# Patient Record
Sex: FEMALE | Race: BLACK OR AFRICAN AMERICAN | NOT HISPANIC OR LATINO | URBAN - METROPOLITAN AREA
[De-identification: names, ages, dates, MRNs, and addresses within clinical notes are randomized per-mention and may not be internally consistent; named-entity substitution may affect disease eponyms.]

---

## 2015-12-24 RX ORDER — ALBUTEROL 90 UG/1
3 AEROSOL, METERED ORAL
Qty: 150 | Refills: 0 | COMMUNITY
Start: 2015-12-24

## 2015-12-24 RX ORDER — ALBUTEROL 90 UG/1
0 AEROSOL, METERED ORAL
Qty: 150 | Refills: 0 | COMMUNITY
Start: 2015-12-24

## 2017-03-27 ENCOUNTER — EMERGENCY (EMERGENCY)
Facility: HOSPITAL | Age: 52
LOS: 1 days | Discharge: HOME | End: 2017-03-27
Attending: EMERGENCY MEDICINE | Admitting: EMERGENCY MEDICINE
Payer: MEDICARE

## 2017-03-27 VITALS
DIASTOLIC BLOOD PRESSURE: 81 MMHG | RESPIRATION RATE: 18 BRPM | SYSTOLIC BLOOD PRESSURE: 151 MMHG | TEMPERATURE: 98 F | HEART RATE: 80 BPM | OXYGEN SATURATION: 96 %

## 2017-03-27 VITALS
RESPIRATION RATE: 20 BRPM | HEART RATE: 78 BPM | TEMPERATURE: 99 F | DIASTOLIC BLOOD PRESSURE: 80 MMHG | OXYGEN SATURATION: 97 % | SYSTOLIC BLOOD PRESSURE: 144 MMHG

## 2017-03-27 DIAGNOSIS — R51 HEADACHE: ICD-10-CM

## 2017-03-27 DIAGNOSIS — Z87.891 PERSONAL HISTORY OF NICOTINE DEPENDENCE: ICD-10-CM

## 2017-03-27 DIAGNOSIS — J45.909 UNSPECIFIED ASTHMA, UNCOMPLICATED: ICD-10-CM

## 2017-03-27 DIAGNOSIS — J44.9 CHRONIC OBSTRUCTIVE PULMONARY DISEASE, UNSPECIFIED: ICD-10-CM

## 2017-03-27 DIAGNOSIS — I10 ESSENTIAL (PRIMARY) HYPERTENSION: ICD-10-CM

## 2017-03-27 DIAGNOSIS — R05 COUGH: ICD-10-CM

## 2017-03-27 DIAGNOSIS — Z88.1 ALLERGY STATUS TO OTHER ANTIBIOTIC AGENTS STATUS: ICD-10-CM

## 2017-03-27 DIAGNOSIS — R11.2 NAUSEA WITH VOMITING, UNSPECIFIED: ICD-10-CM

## 2017-03-27 DIAGNOSIS — K64.8 OTHER HEMORRHOIDS: Chronic | ICD-10-CM

## 2017-03-27 DIAGNOSIS — Z79.82 LONG TERM (CURRENT) USE OF ASPIRIN: ICD-10-CM

## 2017-03-27 DIAGNOSIS — Z88.5 ALLERGY STATUS TO NARCOTIC AGENT: ICD-10-CM

## 2017-03-27 DIAGNOSIS — R19.7 DIARRHEA, UNSPECIFIED: ICD-10-CM

## 2017-03-27 DIAGNOSIS — Z79.899 OTHER LONG TERM (CURRENT) DRUG THERAPY: ICD-10-CM

## 2017-03-27 LAB
ALBUMIN SERPL ELPH-MCNC: 3.6 G/DL — SIGNIFICANT CHANGE UP (ref 3.4–5)
ALP SERPL-CCNC: 99 U/L — SIGNIFICANT CHANGE UP (ref 40–120)
ALT FLD-CCNC: 23 U/L — SIGNIFICANT CHANGE UP (ref 12–42)
ANION GAP SERPL CALC-SCNC: 7 MMOL/L — LOW (ref 9–16)
APPEARANCE UR: CLEAR — SIGNIFICANT CHANGE UP
AST SERPL-CCNC: 18 U/L — SIGNIFICANT CHANGE UP (ref 15–37)
BASOPHILS NFR BLD AUTO: 0.3 % — SIGNIFICANT CHANGE UP (ref 0–2)
BILIRUB SERPL-MCNC: 0.2 MG/DL — SIGNIFICANT CHANGE UP (ref 0.2–1.2)
BILIRUB UR-MCNC: NEGATIVE — SIGNIFICANT CHANGE UP
BUN SERPL-MCNC: 16 MG/DL — SIGNIFICANT CHANGE UP (ref 7–23)
CALCIUM SERPL-MCNC: 9.3 MG/DL — SIGNIFICANT CHANGE UP (ref 8.5–10.5)
CHLORIDE SERPL-SCNC: 106 MMOL/L — SIGNIFICANT CHANGE UP (ref 96–108)
CO2 SERPL-SCNC: 26 MMOL/L — SIGNIFICANT CHANGE UP (ref 22–31)
COLOR SPEC: YELLOW — SIGNIFICANT CHANGE UP
CREAT SERPL-MCNC: 0.65 MG/DL — SIGNIFICANT CHANGE UP (ref 0.5–1.3)
DIFF PNL FLD: NEGATIVE — SIGNIFICANT CHANGE UP
EOSINOPHIL NFR BLD AUTO: 1.1 % — SIGNIFICANT CHANGE UP (ref 0–6)
GLUCOSE SERPL-MCNC: 103 MG/DL — HIGH (ref 70–99)
GLUCOSE UR QL: NEGATIVE — SIGNIFICANT CHANGE UP
HCT VFR BLD CALC: 45.2 % — HIGH (ref 34.5–45)
HGB BLD-MCNC: 15.3 G/DL — SIGNIFICANT CHANGE UP (ref 11.5–15.5)
IMM GRANULOCYTES NFR BLD AUTO: 0.3 % — SIGNIFICANT CHANGE UP (ref 0–1.5)
KETONES UR-MCNC: NEGATIVE — SIGNIFICANT CHANGE UP
LEUKOCYTE ESTERASE UR-ACNC: (no result)
LIDOCAIN IGE QN: 165 U/L — SIGNIFICANT CHANGE UP (ref 73–393)
LYMPHOCYTES # BLD AUTO: 24.6 % — SIGNIFICANT CHANGE UP (ref 13–44)
MCHC RBC-ENTMCNC: 28.7 PG — SIGNIFICANT CHANGE UP (ref 27–34)
MCHC RBC-ENTMCNC: 33.8 G/DL — SIGNIFICANT CHANGE UP (ref 32–36)
MCV RBC AUTO: 84.6 FL — SIGNIFICANT CHANGE UP (ref 80–100)
MONOCYTES NFR BLD AUTO: 6.8 % — SIGNIFICANT CHANGE UP (ref 2–14)
NEUTROPHILS NFR BLD AUTO: 66.9 % — SIGNIFICANT CHANGE UP (ref 43–77)
NITRITE UR-MCNC: NEGATIVE — SIGNIFICANT CHANGE UP
PH UR: 6 — SIGNIFICANT CHANGE UP (ref 4–8.1)
PLATELET # BLD AUTO: 171 K/UL — SIGNIFICANT CHANGE UP (ref 150–400)
POTASSIUM SERPL-MCNC: 3.8 MMOL/L — SIGNIFICANT CHANGE UP (ref 3.5–5.3)
POTASSIUM SERPL-SCNC: 3.8 MMOL/L — SIGNIFICANT CHANGE UP (ref 3.5–5.3)
PROT SERPL-MCNC: 8.3 G/DL — HIGH (ref 6.4–8.2)
PROT UR-MCNC: NEGATIVE MG/DL — SIGNIFICANT CHANGE UP
RBC # BLD: 5.34 M/UL — HIGH (ref 3.8–5.2)
RBC # FLD: 13.6 % — SIGNIFICANT CHANGE UP (ref 10.3–16.9)
SODIUM SERPL-SCNC: 139 MMOL/L — SIGNIFICANT CHANGE UP (ref 132–145)
SP GR SPEC: 1.02 — SIGNIFICANT CHANGE UP (ref 1–1.03)
TROPONIN I SERPL-MCNC: <0.017 NG/ML — SIGNIFICANT CHANGE UP (ref 0.02–0.06)
UROBILINOGEN FLD QL: 0.2 E.U./DL — SIGNIFICANT CHANGE UP
WBC # BLD: 11.2 K/UL — HIGH (ref 3.8–10.5)
WBC # FLD AUTO: 11.2 K/UL — HIGH (ref 3.8–10.5)

## 2017-03-27 PROCEDURE — 99284 EMERGENCY DEPT VISIT MOD MDM: CPT | Mod: 25

## 2017-03-27 PROCEDURE — 71020: CPT | Mod: 26

## 2017-03-27 RX ORDER — SODIUM CHLORIDE 9 MG/ML
1000 INJECTION INTRAMUSCULAR; INTRAVENOUS; SUBCUTANEOUS ONCE
Qty: 0 | Refills: 0 | Status: COMPLETED | OUTPATIENT
Start: 2017-03-27 | End: 2017-03-27

## 2017-03-27 RX ORDER — DEXTROMETHORPHAN HYDROBROMIDE AND PROMETHAZINE HYDROCHLORIDE 15; 6.25 MG/5ML; MG/5ML
5 SYRUP ORAL
Qty: 140 | Refills: 0 | OUTPATIENT
Start: 2017-03-27 | End: 2017-04-03

## 2017-03-27 RX ORDER — KETOROLAC TROMETHAMINE 30 MG/ML
15 SYRINGE (ML) INJECTION ONCE
Qty: 0 | Refills: 0 | Status: DISCONTINUED | OUTPATIENT
Start: 2017-03-27 | End: 2017-03-27

## 2017-03-27 RX ORDER — IPRATROPIUM/ALBUTEROL SULFATE 18-103MCG
3 AEROSOL WITH ADAPTER (GRAM) INHALATION
Qty: 0 | Refills: 0 | Status: COMPLETED | OUTPATIENT
Start: 2017-03-27 | End: 2017-03-27

## 2017-03-27 RX ADMIN — Medication 15 MILLIGRAM(S): at 15:02

## 2017-03-27 RX ADMIN — Medication 3 MILLILITER(S): at 14:21

## 2017-03-27 RX ADMIN — Medication 3 MILLILITER(S): at 13:53

## 2017-03-27 RX ADMIN — SODIUM CHLORIDE 2000 MILLILITER(S): 9 INJECTION INTRAMUSCULAR; INTRAVENOUS; SUBCUTANEOUS at 13:29

## 2017-03-27 RX ADMIN — Medication 3 MILLILITER(S): at 13:58

## 2017-03-27 NOTE — ED PROVIDER NOTE - NS ED MD SCRIBE ATTENDING SCRIBE SECTIONS
PHYSICAL EXAM/PAST MEDICAL/SURGICAL/SOCIAL HISTORY/DISPOSITION/HIV/VITAL SIGNS( Pullset)/REVIEW OF SYSTEMS/HISTORY OF PRESENT ILLNESS/PROGRESS NOTE/RESULTS

## 2017-03-27 NOTE — ED PROVIDER NOTE - MEDICAL DECISION MAKING DETAILS
multiple symptoms in man organ systems.  Cough x 2 weeks, left chest wall pain, n/v/d, headache.  Appears well.  No vital sign derangements.  No abdominal or chest wall tenderness.  Lungs clear.  NO hypoxia.  Labs, cxr, pain medication ordered.

## 2017-03-27 NOTE — ED ADULT TRIAGE NOTE - CHIEF COMPLAINT QUOTE
Patient c/o generalized body ache, nausea, vomiting x3 days. Also w/productive cough x2 weeks. Denies fever.

## 2017-03-27 NOTE — ED PROVIDER NOTE - CHPI ED SYMPTOMS POS
NAUSEA/DIARRHEA/VOMITING/abd pain, rectal bleeding, cough, HA VOMITING/DIARRHEA/NAUSEA/abd pain,  cough, HA

## 2017-03-27 NOTE — ED PROVIDER NOTE - CARDIAC, MLM
Normal rate, regular rhythm.  Heart sounds S1, S2.  No murmurs, rubs or gallops. Normal rate, regular rhythm.   No murmurs, rubs or gallops.

## 2017-03-27 NOTE — ED PROVIDER NOTE - PROGRESS NOTE DETAILS
labs unremarkable.  CXR without acute findings.  Refused EKG.  Conservative management discussed with the patient in detail.  Close PMD follow up encouraged.  Strict ED return instructions discussed in detail and patient given the opportunity to ask any questions about their discharge diagnosis and instructions.  Has follow up at [facility].

## 2017-03-27 NOTE — ED PROVIDER NOTE - OBJECTIVE STATEMENT
51y f with h/o artritis, COPD, Anxiety, Heart Disease, Bleeding internal hemorrhoids, HTN, stroke (Nov 2016 - resulting mild L-sided weakness) pw n/v/d x 3 days. + cough/HA x 2 weeks. Pt came into today after her  told her she appeared ill. Pt also notes abd pain and rectal bleeding last night (now resolved). Denies f/c, hematuria, sob. No recent falls/trauma or foreign travels. 51y f with h/o artritis, COPD, Anxiety, Heart Disease, Bleeding internal hemorrhoids, HTN, stroke (Nov 2016 - resulting mild L-sided weakness) pw n/v/d x 3 days. + cough/HA x 2 weeks. Pt came into today after her  told her she appeared ill. Pt also notes abd pain and some blood in her stool last night (now resolved). Denies f/c, hematuria, sob. No recent falls/trauma or foreign travels.  Follows at [facility].  Uses COPD medications but still smoking.  Last hospitalization > 90 days ago

## 2017-05-24 RX ORDER — ATORVASTATIN CALCIUM 80 MG/1
1 TABLET, FILM COATED ORAL
Qty: 90 | Refills: 0 | COMMUNITY
Start: 2017-05-24

## 2017-05-24 RX ORDER — ATORVASTATIN CALCIUM 80 MG/1
0 TABLET, FILM COATED ORAL
Qty: 90 | Refills: 0 | COMMUNITY
Start: 2017-05-24

## 2017-07-24 RX ORDER — IBUPROFEN 200 MG
0 TABLET ORAL
Qty: 60 | Refills: 0 | COMMUNITY
Start: 2017-07-24

## 2017-07-24 RX ORDER — TIOTROPIUM BROMIDE 18 UG/1
1 CAPSULE ORAL; RESPIRATORY (INHALATION)
Qty: 30 | Refills: 0 | COMMUNITY
Start: 2017-07-24

## 2017-07-24 RX ORDER — FLUTICASONE PROPIONATE AND SALMETEROL 50; 250 UG/1; UG/1
1 POWDER ORAL; RESPIRATORY (INHALATION)
Qty: 60 | Refills: 0 | COMMUNITY
Start: 2017-07-24

## 2017-07-24 RX ORDER — METOPROLOL TARTRATE 50 MG
0 TABLET ORAL
Qty: 60 | Refills: 0 | COMMUNITY
Start: 2017-07-24

## 2017-07-24 RX ORDER — FLUTICASONE PROPIONATE AND SALMETEROL 50; 250 UG/1; UG/1
0 POWDER ORAL; RESPIRATORY (INHALATION)
Qty: 60 | Refills: 0 | COMMUNITY
Start: 2017-07-24

## 2017-07-24 RX ORDER — TIOTROPIUM BROMIDE 18 UG/1
0 CAPSULE ORAL; RESPIRATORY (INHALATION)
Qty: 30 | Refills: 0 | COMMUNITY
Start: 2017-07-24

## 2017-07-24 RX ORDER — ALBUTEROL 90 UG/1
0 AEROSOL, METERED ORAL
Qty: 85 | Refills: 0 | COMMUNITY
Start: 2017-07-24

## 2017-07-24 RX ORDER — LISINOPRIL 2.5 MG/1
1 TABLET ORAL
Qty: 30 | Refills: 0 | COMMUNITY
Start: 2017-07-24

## 2017-07-24 RX ORDER — METOPROLOL TARTRATE 50 MG
1 TABLET ORAL
Qty: 60 | Refills: 0 | COMMUNITY
Start: 2017-07-24

## 2017-07-24 RX ORDER — LISINOPRIL 2.5 MG/1
0 TABLET ORAL
Qty: 30 | Refills: 0 | COMMUNITY
Start: 2017-07-24

## 2017-07-26 RX ORDER — DICLOFENAC SODIUM 75 MG/1
0 TABLET, DELAYED RELEASE ORAL
Qty: 100 | Refills: 0 | COMMUNITY
Start: 2017-07-26

## 2017-07-26 RX ORDER — GABAPENTIN 400 MG/1
1 CAPSULE ORAL
Qty: 60 | Refills: 0 | COMMUNITY
Start: 2017-07-26

## 2017-07-26 RX ORDER — OXYCODONE HYDROCHLORIDE 5 MG/1
0.5 TABLET ORAL
Qty: 60 | Refills: 0 | COMMUNITY
Start: 2017-07-26

## 2017-07-26 RX ORDER — OXYCODONE HYDROCHLORIDE 5 MG/1
0 TABLET ORAL
Qty: 60 | Refills: 0 | COMMUNITY
Start: 2017-07-26

## 2017-07-26 RX ORDER — TIZANIDINE 4 MG/1
1 TABLET ORAL
Qty: 30 | Refills: 0 | COMMUNITY
Start: 2017-07-26

## 2017-07-26 RX ORDER — GABAPENTIN 400 MG/1
0 CAPSULE ORAL
Qty: 60 | Refills: 0 | COMMUNITY
Start: 2017-07-26

## 2017-07-26 RX ORDER — TIZANIDINE 4 MG/1
0 TABLET ORAL
Qty: 30 | Refills: 0 | COMMUNITY
Start: 2017-07-26

## 2017-07-26 RX ORDER — DICLOFENAC SODIUM 75 MG/1
1 TABLET, DELAYED RELEASE ORAL
Qty: 100 | Refills: 0 | COMMUNITY
Start: 2017-07-26

## 2017-07-31 RX ORDER — AZITHROMYCIN 500 MG/1
0 TABLET, FILM COATED ORAL
Qty: 7 | Refills: 0 | COMMUNITY
Start: 2017-07-31

## 2017-07-31 RX ORDER — AZITHROMYCIN 500 MG/1
1 TABLET, FILM COATED ORAL
Qty: 7 | Refills: 0 | COMMUNITY
Start: 2017-07-31

## 2017-08-02 ENCOUNTER — INPATIENT (INPATIENT)
Facility: HOSPITAL | Age: 52
LOS: 0 days | Discharge: HOME | DRG: 202 | End: 2017-08-03
Attending: STUDENT IN AN ORGANIZED HEALTH CARE EDUCATION/TRAINING PROGRAM | Admitting: STUDENT IN AN ORGANIZED HEALTH CARE EDUCATION/TRAINING PROGRAM
Payer: MEDICARE

## 2017-08-02 VITALS
RESPIRATION RATE: 18 BRPM | WEIGHT: 216.05 LBS | HEART RATE: 91 BPM | SYSTOLIC BLOOD PRESSURE: 154 MMHG | TEMPERATURE: 98 F | OXYGEN SATURATION: 95 % | DIASTOLIC BLOOD PRESSURE: 104 MMHG

## 2017-08-02 DIAGNOSIS — E78.5 HYPERLIPIDEMIA, UNSPECIFIED: ICD-10-CM

## 2017-08-02 DIAGNOSIS — Z29.9 ENCOUNTER FOR PROPHYLACTIC MEASURES, UNSPECIFIED: ICD-10-CM

## 2017-08-02 DIAGNOSIS — G47.33 OBSTRUCTIVE SLEEP APNEA (ADULT) (PEDIATRIC): ICD-10-CM

## 2017-08-02 DIAGNOSIS — J44.9 CHRONIC OBSTRUCTIVE PULMONARY DISEASE, UNSPECIFIED: ICD-10-CM

## 2017-08-02 DIAGNOSIS — R05 COUGH: ICD-10-CM

## 2017-08-02 DIAGNOSIS — I50.9 HEART FAILURE, UNSPECIFIED: ICD-10-CM

## 2017-08-02 DIAGNOSIS — F41.9 ANXIETY DISORDER, UNSPECIFIED: ICD-10-CM

## 2017-08-02 DIAGNOSIS — I25.10 ATHEROSCLEROTIC HEART DISEASE OF NATIVE CORONARY ARTERY WITHOUT ANGINA PECTORIS: ICD-10-CM

## 2017-08-02 DIAGNOSIS — K64.8 OTHER HEMORRHOIDS: Chronic | ICD-10-CM

## 2017-08-02 DIAGNOSIS — I10 ESSENTIAL (PRIMARY) HYPERTENSION: ICD-10-CM

## 2017-08-02 DIAGNOSIS — R07.9 CHEST PAIN, UNSPECIFIED: ICD-10-CM

## 2017-08-02 LAB
ALBUMIN SERPL ELPH-MCNC: 3.7 G/DL — SIGNIFICANT CHANGE UP (ref 3.4–5)
ALP SERPL-CCNC: 83 U/L — SIGNIFICANT CHANGE UP (ref 40–120)
ALT FLD-CCNC: 26 U/L — SIGNIFICANT CHANGE UP (ref 12–42)
ANION GAP SERPL CALC-SCNC: 9 MMOL/L — SIGNIFICANT CHANGE UP (ref 9–16)
APPEARANCE UR: CLEAR — SIGNIFICANT CHANGE UP
AST SERPL-CCNC: 26 U/L — SIGNIFICANT CHANGE UP (ref 15–37)
BASOPHILS NFR BLD AUTO: 0.5 % — SIGNIFICANT CHANGE UP (ref 0–2)
BILIRUB SERPL-MCNC: 0.4 MG/DL — SIGNIFICANT CHANGE UP (ref 0.2–1.2)
BILIRUB UR-MCNC: NEGATIVE — SIGNIFICANT CHANGE UP
BUN SERPL-MCNC: 8 MG/DL — SIGNIFICANT CHANGE UP (ref 7–23)
CALCIUM SERPL-MCNC: 9.6 MG/DL — SIGNIFICANT CHANGE UP (ref 8.5–10.5)
CHLORIDE SERPL-SCNC: 108 MMOL/L — SIGNIFICANT CHANGE UP (ref 96–108)
CO2 SERPL-SCNC: 24 MMOL/L — SIGNIFICANT CHANGE UP (ref 22–31)
COLOR SPEC: YELLOW — SIGNIFICANT CHANGE UP
CREAT SERPL-MCNC: 0.75 MG/DL — SIGNIFICANT CHANGE UP (ref 0.5–1.3)
D DIMER BLD IA.RAPID-MCNC: <150 NG/ML DDU — SIGNIFICANT CHANGE UP
DIFF PNL FLD: NEGATIVE — SIGNIFICANT CHANGE UP
EOSINOPHIL NFR BLD AUTO: 2.4 % — SIGNIFICANT CHANGE UP (ref 0–6)
GLUCOSE SERPL-MCNC: 105 MG/DL — HIGH (ref 70–99)
GLUCOSE UR QL: NEGATIVE — SIGNIFICANT CHANGE UP
HCT VFR BLD CALC: 43.2 % — SIGNIFICANT CHANGE UP (ref 34.5–45)
HGB BLD-MCNC: 14.5 G/DL — SIGNIFICANT CHANGE UP (ref 11.5–15.5)
HIV 1 & 2 AB SERPL IA.RAPID: SIGNIFICANT CHANGE UP
IMM GRANULOCYTES NFR BLD AUTO: 0.3 % — SIGNIFICANT CHANGE UP (ref 0–1.5)
KETONES UR-MCNC: NEGATIVE — SIGNIFICANT CHANGE UP
LEUKOCYTE ESTERASE UR-ACNC: NEGATIVE — SIGNIFICANT CHANGE UP
LYMPHOCYTES # BLD AUTO: 33.8 % — SIGNIFICANT CHANGE UP (ref 13–44)
MAGNESIUM SERPL-MCNC: 2.1 MG/DL — SIGNIFICANT CHANGE UP (ref 1.6–2.6)
MCHC RBC-ENTMCNC: 28.4 PG — SIGNIFICANT CHANGE UP (ref 27–34)
MCHC RBC-ENTMCNC: 33.6 G/DL — SIGNIFICANT CHANGE UP (ref 32–36)
MCV RBC AUTO: 84.7 FL — SIGNIFICANT CHANGE UP (ref 80–100)
MONOCYTES NFR BLD AUTO: 5.8 % — SIGNIFICANT CHANGE UP (ref 2–14)
NEUTROPHILS NFR BLD AUTO: 57.2 % — SIGNIFICANT CHANGE UP (ref 43–77)
NITRITE UR-MCNC: NEGATIVE — SIGNIFICANT CHANGE UP
NT-PROBNP SERPL-SCNC: 54 PG/ML — SIGNIFICANT CHANGE UP
PH UR: 6 — SIGNIFICANT CHANGE UP (ref 5–8)
PLATELET # BLD AUTO: 159 K/UL — SIGNIFICANT CHANGE UP (ref 150–400)
POTASSIUM SERPL-MCNC: 3.6 MMOL/L — SIGNIFICANT CHANGE UP (ref 3.5–5.3)
POTASSIUM SERPL-SCNC: 3.6 MMOL/L — SIGNIFICANT CHANGE UP (ref 3.5–5.3)
PROT SERPL-MCNC: 7.7 G/DL — SIGNIFICANT CHANGE UP (ref 6.4–8.2)
PROT UR-MCNC: NEGATIVE MG/DL — SIGNIFICANT CHANGE UP
RBC # BLD: 5.1 M/UL — SIGNIFICANT CHANGE UP (ref 3.8–5.2)
RBC # FLD: 14 % — SIGNIFICANT CHANGE UP (ref 10.3–16.9)
SODIUM SERPL-SCNC: 141 MMOL/L — SIGNIFICANT CHANGE UP (ref 132–145)
SP GR SPEC: 1.02 — SIGNIFICANT CHANGE UP (ref 1–1.03)
TROPONIN I SERPL-MCNC: <0.017 NG/ML — LOW (ref 0.02–0.06)
TROPONIN I SERPL-MCNC: <0.017 NG/ML — LOW (ref 0.02–0.06)
UROBILINOGEN FLD QL: 0.2 E.U./DL — SIGNIFICANT CHANGE UP
WBC # BLD: 7.6 K/UL — SIGNIFICANT CHANGE UP (ref 3.8–10.5)
WBC # FLD AUTO: 7.6 K/UL — SIGNIFICANT CHANGE UP (ref 3.8–10.5)

## 2017-08-02 PROCEDURE — 71020: CPT | Mod: 26

## 2017-08-02 PROCEDURE — 99236 HOSP IP/OBS SAME DATE HI 85: CPT | Mod: 25

## 2017-08-02 PROCEDURE — 99285 EMERGENCY DEPT VISIT HI MDM: CPT

## 2017-08-02 PROCEDURE — 99223 1ST HOSP IP/OBS HIGH 75: CPT | Mod: GC

## 2017-08-02 PROCEDURE — 93010 ELECTROCARDIOGRAM REPORT: CPT

## 2017-08-02 RX ORDER — IPRATROPIUM/ALBUTEROL SULFATE 18-103MCG
3 AEROSOL WITH ADAPTER (GRAM) INHALATION EVERY 6 HOURS
Qty: 0 | Refills: 0 | Status: DISCONTINUED | OUTPATIENT
Start: 2017-08-02 | End: 2017-08-03

## 2017-08-02 RX ORDER — METOPROLOL TARTRATE 50 MG
0 TABLET ORAL
Qty: 0 | Refills: 0 | COMMUNITY

## 2017-08-02 RX ORDER — NITROGLYCERIN 6.5 MG
0.4 CAPSULE, EXTENDED RELEASE ORAL ONCE
Qty: 0 | Refills: 0 | Status: COMPLETED | OUTPATIENT
Start: 2017-08-02 | End: 2017-08-02

## 2017-08-02 RX ORDER — KETOROLAC TROMETHAMINE 30 MG/ML
30 SYRINGE (ML) INJECTION ONCE
Qty: 0 | Refills: 0 | Status: DISCONTINUED | OUTPATIENT
Start: 2017-08-02 | End: 2017-08-02

## 2017-08-02 RX ORDER — LISINOPRIL 2.5 MG/1
0 TABLET ORAL
Qty: 0 | Refills: 0 | COMMUNITY

## 2017-08-02 RX ORDER — AZITHROMYCIN 500 MG/1
250 TABLET, FILM COATED ORAL ONCE
Qty: 0 | Refills: 0 | Status: COMPLETED | OUTPATIENT
Start: 2017-08-02 | End: 2017-08-02

## 2017-08-02 RX ORDER — NICOTINE POLACRILEX 2 MG
1 GUM BUCCAL DAILY
Qty: 0 | Refills: 0 | Status: DISCONTINUED | OUTPATIENT
Start: 2017-08-03 | End: 2017-08-03

## 2017-08-02 RX ORDER — MAGNESIUM SULFATE 500 MG/ML
2 VIAL (ML) INJECTION ONCE
Qty: 0 | Refills: 0 | Status: COMPLETED | OUTPATIENT
Start: 2017-08-02 | End: 2017-08-02

## 2017-08-02 RX ORDER — BUDESONIDE AND FORMOTEROL FUMARATE DIHYDRATE 160; 4.5 UG/1; UG/1
2 AEROSOL RESPIRATORY (INHALATION)
Qty: 0 | Refills: 0 | Status: DISCONTINUED | OUTPATIENT
Start: 2017-08-02 | End: 2017-08-03

## 2017-08-02 RX ORDER — QUETIAPINE FUMARATE 200 MG/1
0 TABLET, FILM COATED ORAL
Qty: 0 | Refills: 0 | COMMUNITY

## 2017-08-02 RX ORDER — METOPROLOL TARTRATE 50 MG
25 TABLET ORAL
Qty: 0 | Refills: 0 | Status: DISCONTINUED | OUTPATIENT
Start: 2017-08-02 | End: 2017-08-03

## 2017-08-02 RX ORDER — ALBUTEROL 90 UG/1
2.5 AEROSOL, METERED ORAL
Qty: 0 | Refills: 0 | Status: COMPLETED | OUTPATIENT
Start: 2017-08-02 | End: 2017-08-02

## 2017-08-02 RX ORDER — CYCLOBENZAPRINE HYDROCHLORIDE 10 MG/1
10 TABLET, FILM COATED ORAL AT BEDTIME
Qty: 0 | Refills: 0 | Status: DISCONTINUED | OUTPATIENT
Start: 2017-08-02 | End: 2017-08-03

## 2017-08-02 RX ORDER — GABAPENTIN 400 MG/1
300 CAPSULE ORAL
Qty: 0 | Refills: 0 | Status: DISCONTINUED | OUTPATIENT
Start: 2017-08-02 | End: 2017-08-03

## 2017-08-02 RX ORDER — ASPIRIN/CALCIUM CARB/MAGNESIUM 324 MG
0 TABLET ORAL
Qty: 0 | Refills: 0 | COMMUNITY

## 2017-08-02 RX ORDER — OXYCODONE HYDROCHLORIDE 5 MG/1
0 TABLET ORAL
Qty: 0 | Refills: 0 | COMMUNITY

## 2017-08-02 RX ORDER — ASPIRIN/CALCIUM CARB/MAGNESIUM 324 MG
81 TABLET ORAL DAILY
Qty: 0 | Refills: 0 | Status: DISCONTINUED | OUTPATIENT
Start: 2017-08-02 | End: 2017-08-03

## 2017-08-02 RX ORDER — ONDANSETRON 8 MG/1
8 TABLET, FILM COATED ORAL ONCE
Qty: 0 | Refills: 0 | Status: COMPLETED | OUTPATIENT
Start: 2017-08-02 | End: 2017-08-02

## 2017-08-02 RX ORDER — OXYCODONE AND ACETAMINOPHEN 5; 325 MG/1; MG/1
1 TABLET ORAL ONCE
Qty: 0 | Refills: 0 | Status: DISCONTINUED | OUTPATIENT
Start: 2017-08-02 | End: 2017-08-02

## 2017-08-02 RX ORDER — HEPARIN SODIUM 5000 [USP'U]/ML
5000 INJECTION INTRAVENOUS; SUBCUTANEOUS EVERY 8 HOURS
Qty: 0 | Refills: 0 | Status: DISCONTINUED | OUTPATIENT
Start: 2017-08-02 | End: 2017-08-03

## 2017-08-02 RX ORDER — ATORVASTATIN CALCIUM 80 MG/1
0 TABLET, FILM COATED ORAL
Qty: 0 | Refills: 0 | COMMUNITY

## 2017-08-02 RX ORDER — IPRATROPIUM/ALBUTEROL SULFATE 18-103MCG
3 AEROSOL WITH ADAPTER (GRAM) INHALATION ONCE
Qty: 0 | Refills: 0 | Status: COMPLETED | OUTPATIENT
Start: 2017-08-02 | End: 2017-08-02

## 2017-08-02 RX ORDER — HYDROCHLOROTHIAZIDE 25 MG
25 TABLET ORAL DAILY
Qty: 0 | Refills: 0 | Status: DISCONTINUED | OUTPATIENT
Start: 2017-08-02 | End: 2017-08-03

## 2017-08-02 RX ORDER — OXYCODONE AND ACETAMINOPHEN 5; 325 MG/1; MG/1
2 TABLET ORAL ONCE
Qty: 0 | Refills: 0 | Status: DISCONTINUED | OUTPATIENT
Start: 2017-08-02 | End: 2017-08-02

## 2017-08-02 RX ORDER — ATORVASTATIN CALCIUM 80 MG/1
40 TABLET, FILM COATED ORAL AT BEDTIME
Qty: 0 | Refills: 0 | Status: DISCONTINUED | OUTPATIENT
Start: 2017-08-02 | End: 2017-08-03

## 2017-08-02 RX ORDER — OXYCODONE HYDROCHLORIDE 5 MG/1
15 TABLET ORAL
Qty: 0 | Refills: 0 | Status: DISCONTINUED | OUTPATIENT
Start: 2017-08-02 | End: 2017-08-03

## 2017-08-02 RX ORDER — POTASSIUM CHLORIDE 20 MEQ
40 PACKET (EA) ORAL ONCE
Qty: 0 | Refills: 0 | Status: COMPLETED | OUTPATIENT
Start: 2017-08-02 | End: 2017-08-02

## 2017-08-02 RX ORDER — GABAPENTIN 400 MG/1
0 CAPSULE ORAL
Qty: 0 | Refills: 0 | COMMUNITY

## 2017-08-02 RX ORDER — TIOTROPIUM BROMIDE 18 UG/1
1 CAPSULE ORAL; RESPIRATORY (INHALATION) DAILY
Qty: 0 | Refills: 0 | Status: DISCONTINUED | OUTPATIENT
Start: 2017-08-02 | End: 2017-08-03

## 2017-08-02 RX ORDER — DIAZEPAM 5 MG
0 TABLET ORAL
Qty: 0 | Refills: 0 | COMMUNITY

## 2017-08-02 RX ORDER — LISINOPRIL 2.5 MG/1
40 TABLET ORAL DAILY
Qty: 0 | Refills: 0 | Status: DISCONTINUED | OUTPATIENT
Start: 2017-08-02 | End: 2017-08-03

## 2017-08-02 RX ORDER — SODIUM CHLORIDE 9 MG/ML
1000 INJECTION INTRAMUSCULAR; INTRAVENOUS; SUBCUTANEOUS ONCE
Qty: 0 | Refills: 0 | Status: COMPLETED | OUTPATIENT
Start: 2017-08-02 | End: 2017-08-02

## 2017-08-02 RX ADMIN — Medication 30 MILLIGRAM(S): at 12:19

## 2017-08-02 RX ADMIN — AZITHROMYCIN 250 MILLIGRAM(S): 500 TABLET, FILM COATED ORAL at 14:27

## 2017-08-02 RX ADMIN — OXYCODONE AND ACETAMINOPHEN 2 TABLET(S): 5; 325 TABLET ORAL at 19:34

## 2017-08-02 RX ADMIN — OXYCODONE AND ACETAMINOPHEN 2 TABLET(S): 5; 325 TABLET ORAL at 21:00

## 2017-08-02 RX ADMIN — Medication 0.4 MILLIGRAM(S): at 13:20

## 2017-08-02 RX ADMIN — Medication 200 MILLIGRAM(S): at 10:02

## 2017-08-02 RX ADMIN — ALBUTEROL 2.5 MILLIGRAM(S): 90 AEROSOL, METERED ORAL at 16:20

## 2017-08-02 RX ADMIN — OXYCODONE AND ACETAMINOPHEN 1 TABLET(S): 5; 325 TABLET ORAL at 15:23

## 2017-08-02 RX ADMIN — ALBUTEROL 2.5 MILLIGRAM(S): 90 AEROSOL, METERED ORAL at 14:59

## 2017-08-02 RX ADMIN — Medication 3 MILLILITER(S): at 22:26

## 2017-08-02 RX ADMIN — ONDANSETRON 8 MILLIGRAM(S): 8 TABLET, FILM COATED ORAL at 09:22

## 2017-08-02 RX ADMIN — Medication 3 MILLILITER(S): at 09:22

## 2017-08-02 RX ADMIN — ALBUTEROL 2.5 MILLIGRAM(S): 90 AEROSOL, METERED ORAL at 16:00

## 2017-08-02 RX ADMIN — SODIUM CHLORIDE 1000 MILLILITER(S): 9 INJECTION INTRAMUSCULAR; INTRAVENOUS; SUBCUTANEOUS at 10:02

## 2017-08-02 RX ADMIN — Medication 50 GRAM(S): at 09:22

## 2017-08-02 RX ADMIN — Medication 125 MILLIGRAM(S): at 09:22

## 2017-08-02 RX ADMIN — OXYCODONE AND ACETAMINOPHEN 1 TABLET(S): 5; 325 TABLET ORAL at 14:53

## 2017-08-02 RX ADMIN — Medication 30 MILLIGRAM(S): at 11:49

## 2017-08-02 RX ADMIN — HEPARIN SODIUM 5000 UNIT(S): 5000 INJECTION INTRAVENOUS; SUBCUTANEOUS at 22:26

## 2017-08-02 NOTE — ED CDU PROVIDER NOTE - DETAILS
Patient placed on obs for serial trops and nebs for COPD exacerbation. May need admission and cards eval.

## 2017-08-02 NOTE — ED ADULT TRIAGE NOTE - CHIEF COMPLAINT QUOTE
was prescribed azithromycin for lung infection, pt c/o headache, chest pressure, sob, productive cough with yellow phlegm

## 2017-08-02 NOTE — H&P ADULT - NSHPLABSRESULTS_GEN_ALL_CORE
.  LABS:                         14.5   7.6   )-----------( 159      ( 02 Aug 2017 09:22 )             43.2     08-    141  |  108  |  8   ----------------------------<  105<H>  3.6   |  24  |  0.75    Ca    9.6      02 Aug 2017 09:22  Mg     2.1         TPro  7.7  /  Alb  3.7  /  TBili  0.4  /  DBili  x   /  AST  26  /  ALT  26  /  AlkPhos  83  08      Urinalysis Basic - ( 02 Aug 2017 11:07 )    Color: Yellow / Appearance: Clear / S.020 / pH: x  Gluc: x / Ketone: NEGATIVE  / Bili: NEGATIVE / Urobili: 0.2 E.U./dL   Blood: x / Protein: NEGATIVE mg/dL / Nitrite: NEGATIVE   Leuk Esterase: NEGATIVE / RBC: x / WBC x   Sq Epi: x / Non Sq Epi: x / Bacteria: x      CARDIAC MARKERS ( 02 Aug 2017 12:59 )  <0.017 ng/mL / x     / x     / x     / x      CARDIAC MARKERS ( 02 Aug 2017 09:22 )  <0.017 ng/mL / x     / x     / x     / x                RADIOLOGY, EKG & ADDITIONAL TESTS: Reviewed.   < from: Xray Chest 2 Views PA/Lat (17 @ 09:26) >    EXAM:  XR CHEST PA LAT 2V                           PROCEDURE DATE:  2017                     INTERPRETATION:  CHEST X-RAY dated 2017 9:26 AM    Indication: 52-year-old female with cough    PA and lateral views of the chest are compared to prior chest radiograph   dated 3/27/2017. The cardiomediastinal silhouette is within normal   limits. There is no cardiomegaly. The lungs are clear. No pleural   effusion or pneumothorax visualized. Similar to prior exam.      IMPRESSION:  Clear lungs.    < end of copied text >    EKG:  NSR @ rate of 84 w/ T wave inversions in I, II, III, AVF, AVL, V2-V3 unchanged from prior  Bedside echo:  TDS, EF 55-60%, no gross wall motion abnormalities, no pericardial effusion, normal IVC  (Per Dr. Cook at [facility])

## 2017-08-02 NOTE — H&P ADULT - ASSESSMENT
Pt is a 53 y/o w/ above pmh who is admitted to [facility] from [facility] for cough.

## 2017-08-02 NOTE — H&P ADULT - NSHPSOCIALHISTORY_GEN_ALL_CORE
Current smoker 1/2 ppd, but formerly 1 ppd for 35 years  No EtOH  No drugs    Lives with biological grandaughter but  to her. Has home health attendant 7 days a week

## 2017-08-02 NOTE — H&P ADULT - NSHPPHYSICALEXAM_GEN_ALL_CORE
.  VITAL SIGNS:  T(C): 36.8 (08-02-17 @ 21:29), Max: 36.9 (08-02-17 @ 13:54)  T(F): 98.2 (08-02-17 @ 21:29), Max: 98.5 (08-02-17 @ 13:54)  HR: 93 (08-02-17 @ 21:29) (75 - 108)  BP: 148/83 (08-02-17 @ 21:29) (141/75 - 179/91)  BP(mean): --  RR: 20 (08-02-17 @ 21:29) (16 - 20)  SpO2: 95% (08-02-17 @ 21:29) (94% - 96%)  Wt(kg): --    PHYSICAL EXAM:    Constitutional: WDWN resting comfortably in bed; NAD  Head: NC/AT  Eyes: PERRL, EOMI, anicteric sclera  ENT: no nasal discharge; uvula midline, no oropharyngeal erythema or exudates; MMM  Neck: supple; no JVD or thyromegaly  Respiratory: CTA B/L; no W/R/R, no retractions  Cardiac: +S1/S2; RRR; no M/R/G; PMI non-displaced  Gastrointestinal: soft, NT/ND; no rebound or guarding; +BSx4  Genitourinary: normal external genitalia  Back: spine midline, no bony tenderness or step-offs; no CVAT B/L  Extremities: WWP, no clubbing or cyanosis; no peripheral edema  Musculoskeletal: NROM x4; no joint swelling, tenderness or erythema  Vascular: 2+ radial, femoral, DP/PT pulses B/L  Dermatologic: skin warm, dry and intact; no rashes, wounds, or scars  Lymphatic: no submandibular or cervical LAD  Neurologic: AAOx3; CNII-XII grossly intact; no focal deficits  Psychiatric: affect and characteristics of appearance, verbalizations, behaviors are appropriate .  VITAL SIGNS:  T(C): 36.8 (08-02-17 @ 21:29), Max: 36.9 (08-02-17 @ 13:54)  T(F): 98.2 (08-02-17 @ 21:29), Max: 98.5 (08-02-17 @ 13:54)  HR: 93 (08-02-17 @ 21:29) (75 - 108)  BP: 148/83 (08-02-17 @ 21:29) (141/75 - 179/91)  BP(mean): --  RR: 20 (08-02-17 @ 21:29) (16 - 20)  SpO2: 95% (08-02-17 @ 21:29) (94% - 96%)  Wt(kg): --    PHYSICAL EXAM:    Constitutional: obese, WDWN resting comfortably in bed; NAD  Head: NC/AT  Eyes: PERRL, EOMI, anicteric sclera  ENT: no nasal discharge; uvula midline, no oropharyngeal erythema or exudates; dry MM  Neck: supple; no JVD or thyromegaly  Respiratory: CTA B/L; no W/R/R, no tripoding, speaking in full sentences, non-labored.   Cardiac: +S1/S2; RRR; no M/R/G appreciated, CP over medial left intercostal space that is reproducible with palpation  Gastrointestinal: soft, NT/ND; no rebound or guarding; +BSx4, scar under belly button  Back: spine midline, no bony tenderness or step-offs; no CVAT B/L  Extremities: WWP, no clubbing or cyanosis; no peripheral edema  Musculoskeletal: NROM x4; no joint swelling, tenderness or erythema  Vascular: 2+ radial, femoral, DP/PT pulses B/L  Dermatologic: skin warm, dry and intact; no rashes, wounds, or scars  Lymphatic: no submandibular or cervical LAD  Neurologic: AAOx3; CNII-XII grossly intact; decreased strength of left extremities (3/5) compared to R side (4/5)  Psychiatric: histrionic w/ tangential thinking .  VITAL SIGNS:  T(C): 36.8 (08-02-17 @ 21:29), Max: 36.9 (08-02-17 @ 13:54)  T(F): 98.2 (08-02-17 @ 21:29), Max: 98.5 (08-02-17 @ 13:54)  HR: 93 (08-02-17 @ 21:29) (75 - 108)  BP: 148/83 (08-02-17 @ 21:29) (141/75 - 179/91)  BP(mean): --  RR: 20 (08-02-17 @ 21:29) (16 - 20)  SpO2: 95% (08-02-17 @ 21:29) (94% - 96%)  Wt(kg): --    PHYSICAL EXAM:    Constitutional: obese, WDWN resting comfortably in bed; NAD  Head: NC/AT  Eyes: PERRL, EOMI, anicteric sclera  ENT: no nasal discharge; uvula midline, no oropharyngeal erythema or exudates; dry MM, upper dentures  Neck: supple; no JVD or thyromegaly  Respiratory: CTA B/L; no W/R/R, no tripoding, speaking in full sentences, non-labored.   Cardiac: +S1/S2; RRR; no M/R/G appreciated, CP over medial left intercostal space that is reproducible with palpation  Gastrointestinal: soft, NT/ND; no rebound or guarding; +BSx4, scar under belly button  Back: spine midline, no bony tenderness or step-offs; no CVAT B/L  Extremities: WWP, no clubbing or cyanosis; no peripheral edema  Musculoskeletal: NROM x4; no joint swelling, tenderness or erythema  Vascular: 2+ radial, femoral, DP/PT pulses B/L  Dermatologic: skin warm, dry and intact; no rashes, wounds, or scars  Lymphatic: no submandibular or cervical LAD  Neurologic: AAOx3; CNII-XII grossly intact; decreased strength of left extremities (3/5) compared to R side (4/5)  Psychiatric: histrionic w/ tangential thinking

## 2017-08-02 NOTE — ED ADULT NURSE NOTE - OBJECTIVE STATEMENT
productive cough (thick, tan) x 1 month, vomiting, diarrhea, headache and chest wall pain d/t coughing x 2 days, awaiting MD jorgensen, will continue to monitor

## 2017-08-02 NOTE — H&P ADULT - PMH
Anxiety    Asthma    CAD (coronary artery disease)    Chronic pain    COPD (chronic obstructive pulmonary disease)    Depression    Heart disease    HLD (hyperlipidemia)    HTN (hypertension)    NUZHAT (obstructive sleep apnea)    Suicide attempt    Tobacco abuse    Trichotillomania in adult

## 2017-08-02 NOTE — CONSULT NOTE ADULT - SUBJECTIVE AND OBJECTIVE BOX
[facility] ([place] ER CARDIOLOGY CONSULT NOTE    Patient is a 52y old  Female who presents with a chief complaint of SOB    HPI:52y Female with PMHx significant for CAD (h/o MI with stent in ), CVA ( with slight residual left sided weakness), HTN, HLD, NUZHAT on CPAP, COPD, ongoing tobacco abuse, chronic pain/arthritis, depression (h/o past suicide attempts), cervical CA (h/o LEEP procedure) p/w 2 weeks of progressive SOB.  She notes associated productive cough.  She notes vomiting x 2 days.  The SOB has been accompanied by pleuritic CP x 2 days.  The chest pain is described as "tightness" and is substernal.  She does not note radiation of the pain.  It is worth with deep breathing, palpation, and cough.  Her baseline functional status is limited to about 1 block of ambulation.  The past 2 days she is SOB at rest and with minimal exertion.  She believes she had a normal stress test about 2 years ago.  She has not seen a pulmonologist or cardiologist in the last year.  She denies any recent hospitalizations.    REVIEW OF SYSTEMS: Denies syncope, palpitations, PND, claudication    PAST MEDICAL & SURGICAL HISTORY:  CAD (h/o MI with stent in ), CVA ( with slight residual left sided weakness), HTN, HLD, NUZHAT on CPAP, COPD, ongoing tobacco abuse, chronic pain/arthritis, depression (h/o past suicide attempts), cervical CA (h/o LEEP procedure)   hemorrhoidectomy, colon polypectomy, C/S x 2, tubal ligation, LEEP    ALLERGIES:  tramadol (Unknown)  vancomycin (Unknown)    MEDICATIONS  (home): oxycodone, neurontin, trazodone, seroquel, diazepam, asa, lipitor, lisinopril, metoprolol, meclizine, albuterol, spiriva, advair    SOCIAL HISTORY:  Tobacco: +, ongoing  Alcohol: none  Drugs: none    FAMILY HISTORY:  mother - ovarian cancer, HTN  father - seizures, CVA    Vital Signs Last 24 Hrs  T(C): 36.9 (02 Aug 2017 13:54), Max: 36.9 (02 Aug 2017 13:54)  T(F): 98.5 (02 Aug 2017 13:54), Max: 98.5 (02 Aug 2017 13:54)  HR: 83 (02 Aug 2017 13:54) (75 - 93)  BP: 179/91 (02 Aug 2017 13:54) (144/79 - 179/91)  BP(mean): --  RR: 16 (02 Aug 2017 13:54) (16 - 18)  SpO2: 96% (02 Aug 2017 13:54) (94% - 96%)  PHYSICAL EXAM:  General: NAD  HEENT: NCAT, MMM, no icterus  Neck: no JVD, no carotid bruits  Cardiovascular: S1/S2 nl, RRR, no MRG  Lungs: CTABL, diffuse b/l coarse breath sounds, no wheezes or rales  Abdominal: +BS, soft, NT/ND, no RGR  Extremities: warm, dry, pulses 2+ x 4 ext, no varicosities  Skin: no rash, bruising, or bleeding  Neuro: AAOx3, no FND    ECG: NSR 84 bpm, possible ASMI, age undetermined, T wave inversions I, II, III, AVF, AVL, V2-V3  BEDSIDE ECHO FINDINGS: TDS, EF 55-60%, no gross wall motion abnormalities, no pericardial effusion, normal IVC    LABS:                        14.5   7.6   )-----------( 159      ( 02 Aug 2017 09:22 )             43.2     08-02    141  |  108  |  8   ----------------------------<  105<H>  3.6   |  24  |  0.75    Ca    9.6      02 Aug 2017 09:22  Mg     2.1     08-    TPro  7.7  /  Alb  3.7  /  TBili  0.4  /  DBili  x   /  AST  26  /  ALT  26  /  AlkPhos  83  08-02    CARDIAC MARKERS ( 02 Aug 2017 12:59 )  <0.017 ng/mL / x     / x     / x     / x      CARDIAC MARKERS ( 02 Aug 2017 09:22 )  <0.017 ng/mL / x     / x     / x     / x        Urinalysis Basic - ( 02 Aug 2017 11:07 )    Color: Yellow / Appearance: Clear / S.020 / pH: x  Gluc: x / Ketone: NEGATIVE  / Bili: NEGATIVE / Urobili: 0.2 E.U./dL   Blood: x / Protein: NEGATIVE mg/dL / Nitrite: NEGATIVE   Leuk Esterase: NEGATIVE / RBC: x / WBC x   Sq Epi: x / Non Sq Epi: x / Bacteria: x    RADIOLOGY & ADDITIONAL STUDIES: CXR: clear    PRIOR CARDIAC TESTING: "normal stress test 2 years ago" per pt    ASSESSMENT:  52y Female h/o CAD (h/o MI with stent in ), CVA ( with slight residual left sided weakness), HTN, HLD, NUZHAT on CPAP, COPD, ongoing tobacco abuse, chronic pain/arthritis, depression (h/o past suicide attempts), cervical CA (h/o LEEP procedure)     PLAN:    1.  Chest pain - pleuritic in nature.  Troponin <0.017 x 2.  BNP normal.  Echo with no gross wall motion abnormality.  Suspect lung based etiology, COPD exacerbation  2.  SOB - likely due to COPD exacerbation, recommend medical/pulm eval  3.  Abnormal EKG - recommend formal echo, and stress testing vs. coronary CTA when stable.  To consider inpt cardio consult if necessary.  4.  Discussed with pt and Dr. Junior.  Pt will likely benefit from medical admission for treatment of COPD exacerbation and med optimization.

## 2017-08-02 NOTE — H&P ADULT - PROBLEM SELECTOR PLAN 1
Presents w/ vague hx of cough x 2 weeks that has been worsening w/ SOB x2 days. Also endorses n/v/d with what may be occasionaly post-tussive emesis. Pt afebrile, VSS, no leukocytosis, cxr clear, and only coughing up clear sputum. Lung fields are cta w/ no w/r/r. LA negative. No respiratory distress on exam. Satting well on RA. Findings are not supportive of COPD exacerbation. Pt states UTD on vaccinations as well. Cough likely 2/2 to bronchitis, likely viral. Pt however without any viral sx. As pt HD stable w/ no clear signs indicative of infection, antibiotics are not required at this time nor are steroids. As pt asking for morphine, suspect there may be a malingering component as well.    --c/w dounebs and home spiriva and advair  --CPAP o/n as NUZHAT  --hold abx and steroids for now  --hycodan PRN cough Presents w/ vague hx of cough x 2 weeks that has been worsening w/ SOB x2 days. Also endorses n/v/d with what may be occasionally post-tussive emesis. Pt afebrile, VSS, no leukocytosis, cxr clear, and only coughing up clear sputum. Lung fields are cta w/ no w/r/r. LA negative. No respiratory distress on exam. Satting well on RA. Findings are not supportive of COPD exacerbation. Pt states UTD on vaccinations as well. Cough likely 2/2 to bronchitis, likely viral. Pt however without any viral sx. As pt HD stable w/ no clear signs indicative of infection, antibiotics are not required at this time nor are steroids. As pt asking for morphine, suspect there may be a malingering component as well.    --c/w dounebs and home spiriva and advair  --CPAP o/n as NUZHAT  --hold abx and steroids for now  --hycodan PRN cough Presents w/ vague hx of cough x 2 weeks that has been worsening w/ SOB x2 days. Also endorses n/v/d with what may be occasionally post-tussive emesis. Pt afebrile, VSS, no leukocytosis, cxr clear, and only coughing up clear sputum. Lung fields are cta w/ no w/r/r. LA negative. No respiratory distress on exam. Satting well on RA. Findings are not supportive of COPD exacerbation. Pt states UTD on vaccinations as well. Cough likely 2/2 to bronchitis, likely viral. Pt however without any viral sx. As pt HD stable w/ no clear signs indicative of infection, antibiotics are not required at this time nor are steroids. As pt asking for morphine, suspect there may be a malingering component as well.  Per attending, will check RVP.   --c/w dounebs and home spiriva and advair  --CPAP o/n as NUZHAT  --hold abx and steroids for now  --hycodan PRN cough  --f/u RVP

## 2017-08-02 NOTE — ED PROVIDER NOTE - OBJECTIVE STATEMENT
Patient presenting with cough x 1 month, much worse x 3 days ass with shortness of breath and vomiting. Seen at another MD at an ID clinic yesterday and was Rx'd Azithro. No CXR done. + COPD hx. can't keep any fluid down,. also having diarrhea. No f/c. + headaches and lightheaded. Barely kept any food down x 3 days. Throat is dry and sore. + also hx CAD with MI in 2001- stent. Also CHF. Uses Bipap at night. Also is a breast cancer survivor- 1996. Usign albuterol neb/inhaler at home with no relief- used neb 3x (double doses).

## 2017-08-02 NOTE — H&P ADULT - HISTORY OF PRESENT ILLNESS
Pt is a 51 y/o black obese female with extensive PMH of CAD (h/o MI w/ stent 2001), CVA (2009/2015 w/ slight residual left sided weakness), trichotillomania,  HTN, HLD, NUZHAT on CPAP, COPD, tobacco abuse, chronic pain/arthritis, depression (h/o past suicide attempts x2), panic attacks, cervical CA (h/o LEEP procedure) presents to [facility] from [facility] w/ SOB x 2-3 days. Of note, pt is a poor historian. Pt states she has had a productive cough for 2 weeks that over the 2-3 days got significantly worse, causing her SOB w/ the phlegm changing to a 'yellow color.' She says she went to "a clinic" on monday and got azithromycin of which she has taken 2 doses. She says last night she began to develop chest tightness that felt pressure-like and had left sided chest pain as well. She says she did multiple nebulizers, spiriva, aspirin, and advair treatments but it did not help. She then went to [facility] this morning. Pt says that since Sunday night she has had n/v/d as well. She says she only vomits liquids (NBNB) and her diarrhea is mostly water. Sunday night was her last normal BM she says. She denies any melena but says there was some blood mixed in with her stool, which is common for her because of her hemorrhoids. Pt says she still has 7/10 CP. She says she is severely SOB @ rest and w/ minimal exertion, "gasping for air." She also says she has epigastric pain that is 7/10. She states she is also having a migraine for 4 days that is 9/10 (which she asked the nurse for morphine for). Pt says she has also had L foot pain. Pt states she has not smoked in 2 days because of her cough. Pt also states orthopnea and says yesterday her HHA took her temperature and it was 102.5 but she did not feel febrile. Currently, pt denies any f/c, n/v/d, dysuria, changes in vision, SI, dizziness, pleuritic CP, back pain, and is asking for food. She says she has pain in every joint in her body because she "has arthritis everywhere." Pt also says she "has that nerve disease that makes me pull and eat my hair." Pt states only sick contact was her 3 y/o grandaughter 3 weeks ago who just started school. Pt is a 53 y/o black obese female with extensive PMH of CAD (h/o MI w/ stent 2001), CVA (2009/2015 w/ slight residual left sided weakness), trichotillomania,  HTN, HLD, NUZHAT on CPAP, COPD, tobacco abuse, chronic pain/arthritis, depression (h/o past suicide attempts x2), panic attacks, cervical CA (h/o LEEP procedure) presents to [facility] from [facility] w/ SOB x 2-3 days. Of note, pt is a poor historian. Pt states she has had a productive cough for 2 weeks that over the 2-3 days got significantly worse, causing her SOB w/ the phlegm changing to a 'yellow color.' She says she went to "a clinic" on monday and got azithromycin of which she has taken 2 doses. She says last night she began to develop chest tightness that felt pressure-like and had left sided chest pain as well. She says she did multiple nebulizers, spiriva, aspirin, and advair treatments but it did not help. She then went to [facility] this morning. Pt says that since Sunday night she has had n/v/d as well. She says she only vomits liquids (NBNB) and her diarrhea is mostly water. Sunday night was her last normal BM she says. She denies any melena but says there was some blood mixed in with her stool, which is common for her because of her hemorrhoids. Pt says she still has 7/10 CP. She says she is severely SOB @ rest and w/ minimal exertion, "gasping for air." She also says she has epigastric pain that is 7/10. She states she is also having a migraine for 4 days that is 9/10 (which she asked the nurse for morphine for). Pt says she has also had L foot pain. Pt states she has not smoked in 2 days because of her cough. Pt also states orthopnea and says yesterday her HHA took her temperature and it was 102.5 but she did not feel febrile. Currently, pt denies any f/c, n/v/d, dysuria, changes in vision, sore throat, runny nose, watery eyes, SI, dizziness, pleuritic CP, back pain, and is asking for food. She says she has pain in every joint in her body because she "has arthritis everywhere." Pt also says she "has that nerve disease that makes me pull and eat my hair." Pt states only sick contact was her 3 y/o grandaucarlos 3 weeks ago who just started school. Pt says she has never had this correlation of symptoms before but, per documentation, presented to [facility] w/ same complaints in 3/2017 and was d/c home.     At [facility]:  solumedrol 125 IV, zofran 8 mg IV, nitrostat 0.4 mg, 2 G magnesium, toradol 30 mg, albuterol 0.083 x2 nebulizer, duonebs, tessalon kirk, percocet 5/325 x3, ,

## 2017-08-02 NOTE — H&P ADULT - PROBLEM SELECTOR PLAN 10
HSQ, no PT needed    #Food, electrolytes, and nutrition: cardiac diet, no IVF as tolerating PO, monitor and replete lytes PRN    #nausea w/ vomitting and diarrhea: vague symptoms and pt presented w/ same exact complaint and duration in 3/2017 at [facility]. No current emesis or diarrhea since arrival. Labs also do not correlated with hx as no signs of dehydration or electrolyte abnormalities. Will continue supportive care as needed.    #tobacco abuse: current everyday smoker w/ desire to quit. Nicotine patch. Will give further information on tobacco cessation and education on discharge. HSQ, no PT needed    #Food, electrolytes, and nutrition: cardiac diet, no IVF as tolerating PO, monitor and replete lytes PRN    #nausea w/ vomitting and diarrhea: vague symptoms and pt presented w/ same exact complaint and duration in 3/2017 at [facility]. No current emesis or diarrhea since arrival. Labs also do not correlated with hx as no signs of dehydration or electrolyte abnormalities. Will continue supportive care as needed.    #tobacco abuse: current everyday smoker w/ desire to quit. Nicotine patch. Will give further information on tobacco cessation and education on discharge.    #obesity: BMI >30. Education provided. Cardiac diet

## 2017-08-02 NOTE — ED CDU PROVIDER NOTE - MEDICAL DECISION MAKING DETAILS
Patient placed on obs for COPD exacerbation and n/v/d. N/v have resvled and patient is tolerating PO (at [facility]). Having chest pains but bedside sono by cards stable, EKGs stable and trops neg x2. will admit for COPD. patient dn feel safe to d/c.

## 2017-08-02 NOTE — ED ADULT NURSE NOTE - PMH
Anxiety    Asthma    Chronic pain    COPD (chronic obstructive pulmonary disease)    Heart disease    HTN (hypertension)

## 2017-08-02 NOTE — H&P ADULT - ATTENDING COMMENTS
Pt seen and examined at bedside.     Agree with HPI, ROS as above. Multiple complaints as above. Came to hospital for worsening cough x 2 - 3 weeks.    VS, Labs, FH, SH, allergies, medications, imaging reviewed. Agree with physical exam as above  NAD, Speaking in full sentences, CTA - b/l, CV - RRR, S1, S2, no m/r/g    A/P: Pt is a 51 y/o black obese female with extensive PMH of CAD (h/o MI w/ stent 2001), CVA (2009/2015 w/ slight residual left sided weakness), trichotillomania,  HTN, HLD, NUZHAT on CPAP, COPD, tobacco abuse, chronic pain/arthritis, depression (h/o past suicide attempts x2), panic attacks, cervical CA (h/o LEEP procedure) presents to [facility] from [facility] w/ SOB x 2-3 days.    **SOB  -Likely post viral cough given history of URI symptoms  -C/w duonebs, home medications  -Hycodone for cough symptoms  -No overt signs of infections, pt afebrile, no leukocytosis    Plan otherwise as outlined above..... Pt seen and examined at bedside.     Agree with HPI, ROS as above. Multiple complaints as above. Came to hospital for worsening cough x 2 - 3 weeks.    VS, Labs, FH, SH, allergies, medications, imaging reviewed. Agree with physical exam as above  NAD, Speaking in full sentences, CTA - b/l, CV - RRR, S1, S2, no m/r/g    A/P: Pt is a 51 y/o black obese female with extensive PMH of CAD (h/o MI w/ stent 2001), CVA (2009/2015 w/ slight residual left sided weakness), trichotillomania,  HTN, HLD, NUZHAT on CPAP, COPD, tobacco abuse, chronic pain/arthritis, depression (h/o past suicide attempts x2), panic attacks, cervical CA (h/o LEEP procedure) presents to [facility] from [facility] w/ SOB x 2-3 days.    **SOB  -Likely post viral cough given history of URI symptoms  -C/w duonebs, home COPD medications  -Hycodone PRN for cough  -No overt signs of infections, pt afebrile, clear cxr, no leukocytosis - will hold off antibiotics at this time  -Check RVP  -D-dimer wnl - low suspicion for PE on presentation  -Troponin wnl x 2, EKG with no acute ischemic changes, no ischemic changes noted on bedside echo    Plan otherwise as outlined above.....

## 2017-08-02 NOTE — H&P ADULT - PROBLEM SELECTOR PLAN 7
Pt states hx of HF and on ace/b blocker. Bedside echo at [facility] w/ normal EF. Pt states she has not seen cardiologist for 1 year.  --consider formal echo inpatient

## 2017-08-02 NOTE — ED PROVIDER NOTE - MEDICAL DECISION MAKING DETAILS
Patient presenting with cough, n/v/d x a few days./ + hx CAD/MI. Poss CHF. Will send labs, aggressive COPD tx and IVF/Zofran. Will palce on obs.

## 2017-08-03 ENCOUNTER — TRANSCRIPTION ENCOUNTER (OUTPATIENT)
Age: 52
End: 2017-08-03

## 2017-08-03 VITALS
DIASTOLIC BLOOD PRESSURE: 84 MMHG | RESPIRATION RATE: 17 BRPM | SYSTOLIC BLOOD PRESSURE: 138 MMHG | OXYGEN SATURATION: 95 % | TEMPERATURE: 98 F | HEART RATE: 75 BPM

## 2017-08-03 LAB
ALBUMIN SERPL ELPH-MCNC: 3.8 G/DL — SIGNIFICANT CHANGE UP (ref 3.3–5)
ALP SERPL-CCNC: 72 U/L — SIGNIFICANT CHANGE UP (ref 40–120)
ALT FLD-CCNC: 18 U/L — SIGNIFICANT CHANGE UP (ref 10–45)
ANION GAP SERPL CALC-SCNC: 12 MMOL/L — SIGNIFICANT CHANGE UP (ref 5–17)
AST SERPL-CCNC: 11 U/L — SIGNIFICANT CHANGE UP (ref 10–40)
BASOPHILS NFR BLD AUTO: 0.1 % — SIGNIFICANT CHANGE UP (ref 0–2)
BILIRUB SERPL-MCNC: 0.2 MG/DL — SIGNIFICANT CHANGE UP (ref 0.2–1.2)
BUN SERPL-MCNC: 9 MG/DL — SIGNIFICANT CHANGE UP (ref 7–23)
CALCIUM SERPL-MCNC: 9.4 MG/DL — SIGNIFICANT CHANGE UP (ref 8.4–10.5)
CHLORIDE SERPL-SCNC: 106 MMOL/L — SIGNIFICANT CHANGE UP (ref 96–108)
CO2 SERPL-SCNC: 23 MMOL/L — SIGNIFICANT CHANGE UP (ref 22–31)
CREAT SERPL-MCNC: 0.5 MG/DL — SIGNIFICANT CHANGE UP (ref 0.5–1.3)
EOSINOPHIL NFR BLD AUTO: 0.1 % — SIGNIFICANT CHANGE UP (ref 0–6)
GLUCOSE SERPL-MCNC: 103 MG/DL — HIGH (ref 70–99)
HCT VFR BLD CALC: 40.6 % — SIGNIFICANT CHANGE UP (ref 34.5–45)
HCV AB S/CO SERPL IA: 0.07 S/CO — SIGNIFICANT CHANGE UP
HCV AB SERPL-IMP: SIGNIFICANT CHANGE UP
HGB BLD-MCNC: 13.5 G/DL — SIGNIFICANT CHANGE UP (ref 11.5–15.5)
INR BLD: 1.06 — SIGNIFICANT CHANGE UP (ref 0.88–1.16)
LYMPHOCYTES # BLD AUTO: 26.4 % — SIGNIFICANT CHANGE UP (ref 13–44)
MAGNESIUM SERPL-MCNC: 2.4 MG/DL — SIGNIFICANT CHANGE UP (ref 1.6–2.6)
MCHC RBC-ENTMCNC: 28.3 PG — SIGNIFICANT CHANGE UP (ref 27–34)
MCHC RBC-ENTMCNC: 33.3 G/DL — SIGNIFICANT CHANGE UP (ref 32–36)
MCV RBC AUTO: 85.1 FL — SIGNIFICANT CHANGE UP (ref 80–100)
MONOCYTES NFR BLD AUTO: 5.2 % — SIGNIFICANT CHANGE UP (ref 2–14)
NEUTROPHILS NFR BLD AUTO: 68.2 % — SIGNIFICANT CHANGE UP (ref 43–77)
PLATELET # BLD AUTO: 157 K/UL — SIGNIFICANT CHANGE UP (ref 150–400)
POTASSIUM SERPL-MCNC: 4 MMOL/L — SIGNIFICANT CHANGE UP (ref 3.5–5.3)
POTASSIUM SERPL-SCNC: 4 MMOL/L — SIGNIFICANT CHANGE UP (ref 3.5–5.3)
PROT SERPL-MCNC: 7 G/DL — SIGNIFICANT CHANGE UP (ref 6–8.3)
PROTHROM AB SERPL-ACNC: 11.8 SEC — SIGNIFICANT CHANGE UP (ref 9.8–12.7)
RAPID RVP RESULT: SIGNIFICANT CHANGE UP
RBC # BLD: 4.77 M/UL — SIGNIFICANT CHANGE UP (ref 3.8–5.2)
RBC # FLD: 14.5 % — SIGNIFICANT CHANGE UP (ref 10.3–16.9)
SODIUM SERPL-SCNC: 141 MMOL/L — SIGNIFICANT CHANGE UP (ref 135–145)
WBC # BLD: 10.1 K/UL — SIGNIFICANT CHANGE UP (ref 3.8–10.5)
WBC # FLD AUTO: 10.1 K/UL — SIGNIFICANT CHANGE UP (ref 3.8–10.5)

## 2017-08-03 PROCEDURE — 94640 AIRWAY INHALATION TREATMENT: CPT

## 2017-08-03 PROCEDURE — 81003 URINALYSIS AUTO W/O SCOPE: CPT

## 2017-08-03 PROCEDURE — 80053 COMPREHEN METABOLIC PANEL: CPT

## 2017-08-03 PROCEDURE — 93306 TTE W/DOPPLER COMPLETE: CPT | Mod: 26

## 2017-08-03 PROCEDURE — 85025 COMPLETE CBC W/AUTO DIFF WBC: CPT

## 2017-08-03 PROCEDURE — 87633 RESP VIRUS 12-25 TARGETS: CPT

## 2017-08-03 PROCEDURE — 94660 CPAP INITIATION&MGMT: CPT

## 2017-08-03 PROCEDURE — 99239 HOSP IP/OBS DSCHRG MGMT >30: CPT

## 2017-08-03 PROCEDURE — 36415 COLL VENOUS BLD VENIPUNCTURE: CPT

## 2017-08-03 PROCEDURE — 96375 TX/PRO/DX INJ NEW DRUG ADDON: CPT

## 2017-08-03 PROCEDURE — 83880 ASSAY OF NATRIURETIC PEPTIDE: CPT

## 2017-08-03 PROCEDURE — 83735 ASSAY OF MAGNESIUM: CPT

## 2017-08-03 PROCEDURE — 86703 HIV-1/HIV-2 1 RESULT ANTBDY: CPT

## 2017-08-03 PROCEDURE — 85379 FIBRIN DEGRADATION QUANT: CPT

## 2017-08-03 PROCEDURE — 99285 EMERGENCY DEPT VISIT HI MDM: CPT | Mod: 25

## 2017-08-03 PROCEDURE — 96374 THER/PROPH/DIAG INJ IV PUSH: CPT

## 2017-08-03 PROCEDURE — 85610 PROTHROMBIN TIME: CPT

## 2017-08-03 PROCEDURE — 87581 M.PNEUMON DNA AMP PROBE: CPT

## 2017-08-03 PROCEDURE — 93306 TTE W/DOPPLER COMPLETE: CPT

## 2017-08-03 PROCEDURE — 93005 ELECTROCARDIOGRAM TRACING: CPT

## 2017-08-03 PROCEDURE — 87486 CHLMYD PNEUM DNA AMP PROBE: CPT

## 2017-08-03 PROCEDURE — 71046 X-RAY EXAM CHEST 2 VIEWS: CPT

## 2017-08-03 PROCEDURE — 86803 HEPATITIS C AB TEST: CPT

## 2017-08-03 PROCEDURE — 87798 DETECT AGENT NOS DNA AMP: CPT

## 2017-08-03 PROCEDURE — G0378: CPT

## 2017-08-03 PROCEDURE — 84484 ASSAY OF TROPONIN QUANT: CPT

## 2017-08-03 RX ORDER — ALBUTEROL 90 UG/1
0 AEROSOL, METERED ORAL
Qty: 0 | Refills: 0 | COMMUNITY
Start: 2017-08-03

## 2017-08-03 RX ORDER — ASPIRIN/CALCIUM CARB/MAGNESIUM 324 MG
1 TABLET ORAL
Qty: 0 | Refills: 0 | COMMUNITY
Start: 2017-08-03

## 2017-08-03 RX ORDER — ASPIRIN/CALCIUM CARB/MAGNESIUM 324 MG
1 TABLET ORAL
Qty: 0 | Refills: 0 | COMMUNITY

## 2017-08-03 RX ORDER — ATORVASTATIN CALCIUM 80 MG/1
1 TABLET, FILM COATED ORAL
Qty: 0 | Refills: 0 | COMMUNITY
Start: 2017-08-03

## 2017-08-03 RX ORDER — LISINOPRIL 2.5 MG/1
1 TABLET ORAL
Qty: 0 | Refills: 0 | COMMUNITY
Start: 2017-08-03

## 2017-08-03 RX ORDER — GUAIFENESIN/DM/PSEUDOEPHEDRINE 595-32-48
10 TABLET, EXTENDED RELEASE 12 HR ORAL
Qty: 1800 | Refills: 2 | OUTPATIENT
Start: 2017-08-03 | End: 2017-10-31

## 2017-08-03 RX ORDER — GABAPENTIN 400 MG/1
1 CAPSULE ORAL
Qty: 0 | Refills: 0 | COMMUNITY
Start: 2017-08-03

## 2017-08-03 RX ORDER — METOPROLOL TARTRATE 50 MG
1 TABLET ORAL
Qty: 0 | Refills: 0 | COMMUNITY
Start: 2017-08-03

## 2017-08-03 RX ORDER — FLUTICASONE PROPIONATE AND SALMETEROL 50; 250 UG/1; UG/1
1 POWDER ORAL; RESPIRATORY (INHALATION)
Qty: 0 | Refills: 0 | COMMUNITY

## 2017-08-03 RX ORDER — ALBUTEROL 90 UG/1
2 AEROSOL, METERED ORAL
Qty: 0 | Refills: 0 | COMMUNITY

## 2017-08-03 RX ORDER — POTASSIUM CHLORIDE 20 MEQ
40 PACKET (EA) ORAL ONCE
Qty: 0 | Refills: 0 | Status: COMPLETED | OUTPATIENT
Start: 2017-08-03 | End: 2017-08-03

## 2017-08-03 RX ORDER — QUETIAPINE FUMARATE 200 MG/1
200 TABLET, FILM COATED ORAL AT BEDTIME
Qty: 0 | Refills: 0 | Status: DISCONTINUED | OUTPATIENT
Start: 2017-08-03 | End: 2017-08-03

## 2017-08-03 RX ORDER — ACETAMINOPHEN 500 MG
650 TABLET ORAL EVERY 6 HOURS
Qty: 0 | Refills: 0 | Status: DISCONTINUED | OUTPATIENT
Start: 2017-08-03 | End: 2017-08-03

## 2017-08-03 RX ORDER — ACETAMINOPHEN 500 MG
2 TABLET ORAL
Qty: 0 | Refills: 0 | COMMUNITY
Start: 2017-08-03

## 2017-08-03 RX ORDER — DIAZEPAM 5 MG
1 TABLET ORAL
Qty: 0 | Refills: 0 | COMMUNITY

## 2017-08-03 RX ORDER — QUETIAPINE FUMARATE 200 MG/1
1 TABLET, FILM COATED ORAL
Qty: 0 | Refills: 0 | COMMUNITY

## 2017-08-03 RX ADMIN — OXYCODONE HYDROCHLORIDE 15 MILLIGRAM(S): 5 TABLET ORAL at 06:45

## 2017-08-03 RX ADMIN — Medication 3 MILLILITER(S): at 12:35

## 2017-08-03 RX ADMIN — GABAPENTIN 300 MILLIGRAM(S): 400 CAPSULE ORAL at 17:45

## 2017-08-03 RX ADMIN — Medication 25 MILLIGRAM(S): at 06:45

## 2017-08-03 RX ADMIN — Medication 40 MILLIEQUIVALENT(S): at 00:44

## 2017-08-03 RX ADMIN — Medication 3 MILLILITER(S): at 06:44

## 2017-08-03 RX ADMIN — HEPARIN SODIUM 5000 UNIT(S): 5000 INJECTION INTRAVENOUS; SUBCUTANEOUS at 06:45

## 2017-08-03 RX ADMIN — LISINOPRIL 40 MILLIGRAM(S): 2.5 TABLET ORAL at 06:45

## 2017-08-03 RX ADMIN — BUDESONIDE AND FORMOTEROL FUMARATE DIHYDRATE 2 PUFF(S): 160; 4.5 AEROSOL RESPIRATORY (INHALATION) at 06:45

## 2017-08-03 RX ADMIN — BUDESONIDE AND FORMOTEROL FUMARATE DIHYDRATE 2 PUFF(S): 160; 4.5 AEROSOL RESPIRATORY (INHALATION) at 18:08

## 2017-08-03 RX ADMIN — GABAPENTIN 300 MILLIGRAM(S): 400 CAPSULE ORAL at 06:45

## 2017-08-03 RX ADMIN — Medication 1 PATCH: at 09:00

## 2017-08-03 RX ADMIN — Medication 3 MILLILITER(S): at 18:08

## 2017-08-03 RX ADMIN — Medication 81 MILLIGRAM(S): at 12:35

## 2017-08-03 RX ADMIN — TIOTROPIUM BROMIDE 1 CAPSULE(S): 18 CAPSULE ORAL; RESPIRATORY (INHALATION) at 16:10

## 2017-08-03 RX ADMIN — HEPARIN SODIUM 5000 UNIT(S): 5000 INJECTION INTRAVENOUS; SUBCUTANEOUS at 14:00

## 2017-08-03 RX ADMIN — OXYCODONE HYDROCHLORIDE 15 MILLIGRAM(S): 5 TABLET ORAL at 07:45

## 2017-08-03 RX ADMIN — QUETIAPINE FUMARATE 200 MILLIGRAM(S): 200 TABLET, FILM COATED ORAL at 01:11

## 2017-08-03 RX ADMIN — Medication 25 MILLIGRAM(S): at 17:45

## 2017-08-03 NOTE — DISCHARGE NOTE ADULT - PLAN OF CARE
Resolution of cough and shortness of breath You came to the hospital with reproducible chest pain. You have Costochondritis, which is inflammation of the ribcage joints. Your EKG and bloodwork were negative for any signs of heart attack. You do not need to be admitted in the hospital for costochondritis, you can take Tyelenol for symptomatic relief. If you feel worsening of symptoms, please return to the nearest Emergency room. Please visit your primary care doctor at your earliest convenience. You came to the hospital with cough and shortness of breath. You have bronchitis, which is inflammation of airway. Your blood work, chest x-ray and echocardiogram were negative for COPD exacerbation. There is no signs of chronic heart failure. You do not need to be admitted in the hospital for bronchitis, you can take Robitussin for symptomatic relief. If you feel worsening of symptoms, please return to the nearest Emergency room. Please visit your primary care doctor at your earliest convenience. You have a history of obstructive sleep apnea. You were put on CPAP overnight. Please continue with your home regimen. You have a history of hypertension. You were treated with your home medications. Please continue with your home regimen.

## 2017-08-03 NOTE — DISCHARGE NOTE ADULT - MEDICATION SUMMARY - MEDICATIONS TO TAKE
I will START or STAY ON the medications listed below when I get home from the hospital:    nebulizer machine  -- Please dispense one nebulizer machine  Use with albuterol solution for nebulization every 4-6 hours PRN for shortness of breath/wheezing  -- Indication: For COPD (chronic obstructive pulmonary disease)    acetaminophen 325 mg oral tablet  -- 2 tab(s) by mouth every 6 hours, As needed, Moderate Pain (4 - 6)  -- Indication: For Costochondritis    aspirin 81 mg oral delayed release tablet  -- 1 tab(s) by mouth once a day  -- Indication: For CAD (coronary artery disease)    lisinopril 40 mg oral tablet  -- 1 tab(s) by mouth once a day  -- Indication: For HTN (hypertension)    gabapentin 300 mg oral capsule  -- 1 cap(s) by mouth 2 times a day  -- Indication: For Chronic pain    atorvastatin 40 mg oral tablet  -- 1 tab(s) by mouth once a day (at bedtime)  -- Indication: For HLD (hyperlipidemia)    metoprolol tartrate 25 mg oral tablet  -- 1 tab(s) by mouth 2 times a day  -- Indication: For HTN (hypertension)    Advair Diskus 250 mcg-50 mcg inhalation powder  -- 1 puff(s) inhaled 2 times a day  -- Indication: For COPD (chronic obstructive pulmonary disease)    albuterol  --     -- Indication: For COPD (chronic obstructive pulmonary disease)    hydroCHLOROthiazide 25 mg oral tablet  -- 1 tab(s) by mouth once a day  -- Indication: For HTN (hypertension)    Robitussin Cold and Cough oral liquid  -- 10 milliliter(s) by mouth every 4 hours  -- Medication should be taken with plenty of water.    -- Indication: For Bronchitis

## 2017-08-03 NOTE — DISCHARGE NOTE ADULT - PATIENT PORTAL LINK FT
“You can access the [facility] Patient Portal, offered by [facility], by registering with the following website: [email or web address]”

## 2017-08-03 NOTE — DISCHARGE NOTE ADULT - HOSPITAL COURSE
52F with h/o NUZHAT with CPAP, COPD (not on home O2), tobacco smoker, CAD (MI w/ stents 2001), CVA with slight left side weakness presenting with productive cough and SOB. Her symptoms progressively worsened in the last two weeks. There was NBNB diarrhea, sorethroat, gasping for past 3 days. 52F with h/o NUZHAT with CPAP, COPD (not on home O2), tobacco smoker (70 pack year), CAD (MI w/ stents 2001), CVA with slight left side weakness presenting with productive cough and SOB. Her symptoms progressively worsened for two weeks. She also had NBNB diarrhea, sore throat and gasping for 3 days. Her vitals and labs were wnl. Her other PMH includes trichotillomania, HTN, HLD, NUZHAT on CPAP, chronic pain/arthritis, depression (h/o suicide attempt x2), panic attacks, cervical cancer (h/o LEEP procedure) and breast cancer s/p lumpectomy. Given the comorbidities, patient was admitted to rule out CHF and/or COPD exacerbation. Patient had stable vitals, reproducible chest pain 52F with h/o NUZHAT with CPAP, COPD (not on home O2), tobacco smoker (70 pack year), CAD (MI w/ stents 2001), CVA with slight left side weakness presenting with productive cough and SOB. Her symptoms progressively worsened for two weeks. She also had NBNB diarrhea, sore throat and gasping for 3 days. Her sick contact includes 3-year old child. Her vitals and labs were wnl. Her other PMH includes trichotillomania, HTN, HLD, NUZHAT on CPAP, chronic pain/arthritis, depression (h/o suicide attempt x2), panic attacks, cervical cancer (h/o LEEP procedure) and breast cancer s/p lumpectomy. Given the comorbidities, patient was admitted to rule out pneumonia, CHF and/or COPD exacerbation. Patient had stable vitals, reproducible chest pain and unremarkable CBC, BMP, negative trops, CXR, EKG with non-specific T wave inversions and unremarkable echocardiogram. The patient probably has bronchitis likely from viral etiology and constochondritis. Patient doesn't need hospitalization and is medically stable for discharge. Patient given 24 hour notice.

## 2017-08-03 NOTE — DISCHARGE NOTE ADULT - PROVIDER TOKENS
FREE:[LAST:[Juan Carlos],FIRST:[Alexander],PHONE:[phone]],FAX:[phone]    -],ADDRESS:[[address] [place] [address]]]

## 2017-08-03 NOTE — DISCHARGE NOTE ADULT - CARE PLAN
Principal Discharge DX:	Bronchitis  Goal:	Resolution of cough and shortness of breath  Instructions for follow-up, activity and diet:	You came to the hospital with cough and shortness of breath. You have bronchitis, which is inflammation of airway. Your blood work, chest x-ray and echocardiogram were negative for COPD exacerbation. There is no signs of chronic heart failure. You do not need to be admitted in the hospital for bronchitis, you can take Robitussin for symptomatic relief. If you feel worsening of symptoms, please return to the nearest Emergency room. Please visit your primary care doctor at your earliest convenience.  Secondary Diagnosis:	Costochondritis  Instructions for follow-up, activity and diet:	You came to the hospital with reproducible chest pain. You have Costochondritis, which is inflammation of the ribcage joints. Your EKG and bloodwork were negative for any signs of heart attack. You do not need to be admitted in the hospital for costochondritis, you can take Tyelenol for symptomatic relief. If you feel worsening of symptoms, please return to the nearest Emergency room. Please visit your primary care doctor at your earliest convenience.  Secondary Diagnosis:	NUZHAT on CPAP  Instructions for follow-up, activity and diet:	You have a history of obstructive sleep apnea. You were put on CPAP overnight. Please continue with your home regimen.  Secondary Diagnosis:	Essential hypertension  Instructions for follow-up, activity and diet:	You have a history of hypertension. You were treated with your home medications. Please continue with your home regimen.

## 2017-08-03 NOTE — DISCHARGE NOTE ADULT - ADDITIONAL INSTRUCTIONS
Please follow-up with your Please follow-up with your primary-care physician. If you have any worsening of symptoms, please go to the nearest Emergency room.

## 2017-08-03 NOTE — DISCHARGE NOTE ADULT - MEDICATION SUMMARY - MEDICATIONS TO STOP TAKING
I will STOP taking the medications listed below when I get home from the hospital:    SEROquel 200 mg oral tablet  -- 1 tab(s) by mouth once a day (at bedtime)    diazePAM 5 mg oral tablet  -- 1 tab(s) by mouth 2 times a day

## 2017-08-03 NOTE — CHART NOTE - NSCHARTNOTEFT_GEN_A_CORE
PGY-3 Chart Note    Pt earlier told me that she still takes diazepam 5 mg BID. iSTOP revealed that pt has not filled medication since 10/14/2016 for only a 30 day supply.     Pt also reported taking Seroquel 200 mg QHS which was not found in outpatient medication review (meds from other sources) with last being filled on 1/2017 for only 30 day supply.     When not given Seroquel (as unverified) pt became irate and threatened to take her medications that she had on her--despite telling me hours earlier that she had no medications on her or a medication list.    Spoke with attending and pt given one time dose of Seroquel 200 mg qhs.     Nursing confiscated patients medications which did not include Seroquel or diazepam.    iSTOP placed in chart

## 2017-08-03 NOTE — DISCHARGE NOTE ADULT - OTHER SIGNIFICANT FINDINGS
Echocardiogram 08.03.17  There is borderline concentric left ventricular hypertrophy.The left   ventricular wall motion is normal.The left ventricular ejection fraction   is  70%.The left atrial size is normal.The left atrial pressure is estimated   to be  within normal limitsRight atrial size is normal.The right ventricle is   normal  in size and function.The tricuspid annular plane systolic excursion   (TAPSE) is  27 mm (normal 17mm or higher).Structurally normal aortic valve.  No   aortic regurgitation noted.Structurally normal mitral valve.Structurally normal   tricuspid valve.Structurally normal pulmonic valve.There is no   pericardial effusion.    Xray Chest 1 View AP/PA 10.10.16  FINDINGS: Heart size, mediastinal and hilar contours within normal   limits. There are mild pulmonary venous congestive changes. There is mild   left hemidiaphragm elevation. No consolidation or pleural collections.   Soft tissues and osseous structures are within normal limits.  IMPRESSION: Mild pulmonary venous congestive change.

## 2017-08-07 DIAGNOSIS — M19.90 UNSPECIFIED OSTEOARTHRITIS, UNSPECIFIED SITE: ICD-10-CM

## 2017-08-07 DIAGNOSIS — Z88.1 ALLERGY STATUS TO OTHER ANTIBIOTIC AGENTS STATUS: ICD-10-CM

## 2017-08-07 DIAGNOSIS — R06.02 SHORTNESS OF BREATH: ICD-10-CM

## 2017-08-07 DIAGNOSIS — F41.9 ANXIETY DISORDER, UNSPECIFIED: ICD-10-CM

## 2017-08-07 DIAGNOSIS — I11.0 HYPERTENSIVE HEART DISEASE WITH HEART FAILURE: ICD-10-CM

## 2017-08-07 DIAGNOSIS — I50.9 HEART FAILURE, UNSPECIFIED: ICD-10-CM

## 2017-08-07 DIAGNOSIS — J45.909 UNSPECIFIED ASTHMA, UNCOMPLICATED: ICD-10-CM

## 2017-08-07 DIAGNOSIS — J44.9 CHRONIC OBSTRUCTIVE PULMONARY DISEASE, UNSPECIFIED: ICD-10-CM

## 2017-08-07 DIAGNOSIS — I69.354 HEMIPLEGIA AND HEMIPARESIS FOLLOWING CEREBRAL INFARCTION AFFECTING LEFT NON-DOMINANT SIDE: ICD-10-CM

## 2017-08-07 DIAGNOSIS — F32.9 MAJOR DEPRESSIVE DISORDER, SINGLE EPISODE, UNSPECIFIED: ICD-10-CM

## 2017-08-07 DIAGNOSIS — M94.0 CHONDROCOSTAL JUNCTION SYNDROME [TIETZE]: ICD-10-CM

## 2017-08-07 DIAGNOSIS — G89.29 OTHER CHRONIC PAIN: ICD-10-CM

## 2017-08-07 DIAGNOSIS — I25.10 ATHEROSCLEROTIC HEART DISEASE OF NATIVE CORONARY ARTERY WITHOUT ANGINA PECTORIS: ICD-10-CM

## 2017-08-07 DIAGNOSIS — G47.33 OBSTRUCTIVE SLEEP APNEA (ADULT) (PEDIATRIC): ICD-10-CM

## 2017-08-07 DIAGNOSIS — D23.9 OTHER BENIGN NEOPLASM OF SKIN, UNSPECIFIED: ICD-10-CM

## 2017-08-07 DIAGNOSIS — J20.8 ACUTE BRONCHITIS DUE TO OTHER SPECIFIED ORGANISMS: ICD-10-CM

## 2017-08-07 DIAGNOSIS — Z91.5 PERSONAL HISTORY OF SELF-HARM: ICD-10-CM

## 2017-08-07 DIAGNOSIS — F17.210 NICOTINE DEPENDENCE, CIGARETTES, UNCOMPLICATED: ICD-10-CM

## 2017-08-28 ENCOUNTER — EMERGENCY (EMERGENCY)
Facility: HOSPITAL | Age: 52
LOS: 1 days | Discharge: HOME | End: 2017-08-28
Admitting: EMERGENCY MEDICINE
Payer: MEDICARE

## 2017-08-28 VITALS
OXYGEN SATURATION: 98 % | TEMPERATURE: 98 F | HEART RATE: 82 BPM | DIASTOLIC BLOOD PRESSURE: 72 MMHG | SYSTOLIC BLOOD PRESSURE: 168 MMHG | RESPIRATION RATE: 16 BRPM

## 2017-08-28 VITALS
HEART RATE: 87 BPM | TEMPERATURE: 97 F | OXYGEN SATURATION: 97 % | SYSTOLIC BLOOD PRESSURE: 187 MMHG | DIASTOLIC BLOOD PRESSURE: 87 MMHG | RESPIRATION RATE: 16 BRPM

## 2017-08-28 DIAGNOSIS — Z79.899 OTHER LONG TERM (CURRENT) DRUG THERAPY: ICD-10-CM

## 2017-08-28 DIAGNOSIS — K64.8 OTHER HEMORRHOIDS: Chronic | ICD-10-CM

## 2017-08-28 DIAGNOSIS — J44.9 CHRONIC OBSTRUCTIVE PULMONARY DISEASE, UNSPECIFIED: ICD-10-CM

## 2017-08-28 DIAGNOSIS — S60.051A CONTUSION OF RIGHT LITTLE FINGER WITHOUT DAMAGE TO NAIL, INITIAL ENCOUNTER: ICD-10-CM

## 2017-08-28 DIAGNOSIS — S80.02XA CONTUSION OF LEFT KNEE, INITIAL ENCOUNTER: ICD-10-CM

## 2017-08-28 DIAGNOSIS — I25.10 ATHEROSCLEROTIC HEART DISEASE OF NATIVE CORONARY ARTERY WITHOUT ANGINA PECTORIS: ICD-10-CM

## 2017-08-28 DIAGNOSIS — Z79.51 LONG TERM (CURRENT) USE OF INHALED STEROIDS: ICD-10-CM

## 2017-08-28 DIAGNOSIS — J45.909 UNSPECIFIED ASTHMA, UNCOMPLICATED: ICD-10-CM

## 2017-08-28 DIAGNOSIS — Z79.82 LONG TERM (CURRENT) USE OF ASPIRIN: ICD-10-CM

## 2017-08-28 DIAGNOSIS — E78.5 HYPERLIPIDEMIA, UNSPECIFIED: ICD-10-CM

## 2017-08-28 DIAGNOSIS — I10 ESSENTIAL (PRIMARY) HYPERTENSION: ICD-10-CM

## 2017-08-28 PROCEDURE — 99284 EMERGENCY DEPT VISIT MOD MDM: CPT | Mod: 25

## 2017-08-28 PROCEDURE — 73130 X-RAY EXAM OF HAND: CPT | Mod: 26,RT

## 2017-08-28 PROCEDURE — 73502 X-RAY EXAM HIP UNI 2-3 VIEWS: CPT | Mod: 26,LT

## 2017-08-28 PROCEDURE — 73562 X-RAY EXAM OF KNEE 3: CPT | Mod: 26,LT

## 2017-08-28 RX ORDER — IBUPROFEN 200 MG
600 TABLET ORAL ONCE
Qty: 0 | Refills: 0 | Status: COMPLETED | OUTPATIENT
Start: 2017-08-28 | End: 2017-08-28

## 2017-08-28 RX ADMIN — Medication 600 MILLIGRAM(S): at 12:09

## 2017-08-28 NOTE — ED PROVIDER NOTE - MUSCULOSKELETAL, MLM
Spine appears normal. Limited ROM to right 5th digit, no deformity. NV intact. No lacerations. Superficial abrasion to left lateral knee with tenderness. Uses cane at baseline.

## 2017-08-28 NOTE — ED ADULT NURSE NOTE - OBJECTIVE STATEMENT
Patient is a 53 y/o female presenting to the ED s/p fall. Patient is a 53 y/o female presenting to the ED s/p mechanical fall with associated left knee pain and right 5th digit pain x 5 days. Patient denies numbness/tingling, fever/chills, head trauma. No deformity noted, few abrasions to leg. Patient in NAD, resting comfortably and will continue to monitor.

## 2017-08-28 NOTE — ED ADULT NURSE NOTE - CHPI ED SYMPTOMS NEG
no confusion/no numbness/no loss of consciousness/no weakness/no vomiting/no bleeding/no tingling/no deformity/no fever

## 2017-08-28 NOTE — ED PROVIDER NOTE - DIAGNOSTIC INTERPRETATION
Interpreted by DELANEY HICKMAN  Right hand XR, 3 views: No fracture, no dislocation (joint spaces grossly normal), no Foreign Body noted, soft tissue normal   Left hip with pelvis XR, 3 views:  No fracture, no dislocation (joint spaces grossly normal), no Foreign Body noted, soft tissue normal   Left knee XR, 3 views No fracture, no dislocation (joint spaces grossly normal), no Foreign Body noted, soft tissue normal

## 2017-08-28 NOTE — ED PROVIDER NOTE - MEDICAL DECISION MAKING DETAILS
mechanical fall 4 days ago, no fx identified and pt ambulatory. will advsied RICE, and hand/ortho f/u. splinted finger and ace for knee mechanical fall 4 days ago, no fx identified and pt ambulatory; however hand xray shows suspicion for distal right 5th pahlanx fx-splinted and hand f/u. will advsied RICE, and hand/ortho f/u. splinted finger and ace for knee

## 2017-08-28 NOTE — ED PROVIDER NOTE - OBJECTIVE STATEMENT
52y F with h/o copd, HTN, HLD, CAD, presents s/p mechanical fall 4 days ago. States she still has persistent 5th digit numbness and left lateral knee pain radiating up the leg. Notes abrasion to knee. Denies fever, loc, n/v. Temporary relief with oxycodone from pain management doctor. 52y F with h/o copd, HTN, HLD, CAD, presents s/p mechanical fall 4 days ago. States she still has persistent 5th digit tingling and pain and left lateral knee pain radiating up the leg. Notes abrasion to knee. Denies fever, loc, n/v. Temporary relief with oxycodone from pain management doctor.

## 2017-12-12 ENCOUNTER — EMERGENCY (EMERGENCY)
Facility: HOSPITAL | Age: 52
LOS: 1 days | Discharge: HOME | End: 2017-12-12
Admitting: EMERGENCY MEDICINE
Payer: MEDICARE

## 2017-12-12 VITALS
HEART RATE: 75 BPM | OXYGEN SATURATION: 99 % | TEMPERATURE: 98 F | HEIGHT: 61 IN | DIASTOLIC BLOOD PRESSURE: 108 MMHG | SYSTOLIC BLOOD PRESSURE: 168 MMHG | RESPIRATION RATE: 17 BRPM | WEIGHT: 179.9 LBS

## 2017-12-12 VITALS
TEMPERATURE: 98 F | DIASTOLIC BLOOD PRESSURE: 90 MMHG | OXYGEN SATURATION: 99 % | RESPIRATION RATE: 18 BRPM | HEART RATE: 76 BPM | SYSTOLIC BLOOD PRESSURE: 160 MMHG
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
## 2017-12-12 DIAGNOSIS — Z79.82 LONG TERM (CURRENT) USE OF ASPIRIN: ICD-10-CM

## 2017-12-12 DIAGNOSIS — Z88.1 ALLERGY STATUS TO OTHER ANTIBIOTIC AGENTS STATUS: ICD-10-CM

## 2017-12-12 DIAGNOSIS — Z79.899 OTHER LONG TERM (CURRENT) DRUG THERAPY: ICD-10-CM
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
## 2017-12-12 DIAGNOSIS — M25.562 PAIN IN LEFT KNEE: ICD-10-CM
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
## 2017-12-12 DIAGNOSIS — S80.02XA CONTUSION OF LEFT KNEE, INITIAL ENCOUNTER: ICD-10-CM

## 2017-12-12 DIAGNOSIS — K64.8 OTHER HEMORRHOIDS: Chronic | ICD-10-CM
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
## 2017-12-12 DIAGNOSIS — Z88.5 ALLERGY STATUS TO NARCOTIC AGENT: ICD-10-CM

## 2017-12-12 PROCEDURE — 73562 X-RAY EXAM OF KNEE 3: CPT | Mod: 26,LT

## 2017-12-12 PROCEDURE — 99283 EMERGENCY DEPT VISIT LOW MDM: CPT | Mod: 25

## 2017-12-12 PROCEDURE — 73562 X-RAY EXAM OF KNEE 3: CPT | Mod: 26,RT

## 2017-12-12 RX ORDER — OXYCODONE AND ACETAMINOPHEN 5; 325 MG/1; MG/1
1 TABLET ORAL ONCE
Qty: 0 | Refills: 0 | Status: DISCONTINUED | OUTPATIENT
Start: 2017-12-12 | End: 2017-12-12

## 2017-12-12 RX ORDER — IBUPROFEN 200 MG
600 TABLET ORAL ONCE
Qty: 0 | Refills: 0 | Status: COMPLETED | OUTPATIENT
Start: 2017-12-12 | End: 2017-12-12

## 2017-12-12 RX ADMIN — OXYCODONE AND ACETAMINOPHEN 1 TABLET(S): 5; 325 TABLET ORAL at 14:08

## 2017-12-12 NOTE — ED PROVIDER NOTE - PROGRESS NOTE DETAILS
patient yelling at me for discharge. refuses motrin bc states she is sensitive to motrin. xray results reviewed with patient. will give one percocet. no acute findings. offered patient crutches and knee immobilizer. she has her own ortho to follow up with. patient very demanding

## 2017-12-12 NOTE — ED PROVIDER NOTE - DIAGNOSTIC INTERPRETATION
Interpreted by PA  left knee 3V  No fracture, no dislocation, +arthritic changes, no Foreign Body noted, soft tissue normal

## 2017-12-12 NOTE — ED PROVIDER NOTE - OBJECTIVE STATEMENT
52 year old female with h/o arthritis in knees and chronic knee pain presents with complaints of left knee pain s/p fall. reports mechanical fall was walking outside and fell. she denies pain in right knee 52 year old female with h/o arthritis in knees and chronic knee pain presents with complaints of left knee pain s/p fall. reports mechanical fall was walking outside and fell. she denies pain in right knee. she reports able to ambulate but with some pain.   she did not want to come to ED but her friend made her come.  Denies head trauma, LOC, break in the skin, paresthesia, numbness, tingling, redness, bleeding, d/c, HA, dizziness, SOB, CP, palpitations, N/V, focal weakness, and malaise. 52 year old female with h/o arthritis in knees and chronic knee pain presents with complaints of left knee pain s/p fall. reports mechanical fall was walking outside and fell. she denies pain in right knee. she reports able to ambulate but with some pain.  no ankle or hip pain  she did not want to come to ED but her friend made her come.  Denies head trauma, LOC, break in the skin, paresthesia, numbness, tingling, redness, bleeding, d/c, HA, dizziness, SOB, CP, palpitations, N/V, focal weakness, and malaise.

## 2017-12-12 NOTE — ED PROVIDER NOTE - PHYSICAL EXAMINATION
VITAL SIGNS: I have reviewed nursing notes and confirm.  CONSTITUTIONAL: Well-developed; well-nourished; in no acute distress.  SKIN: Skin is warm and dry, no acute rash.  HEAD: Normocephalic; atraumatic.  EYES: PERRL, EOM intact; conjunctiva and sclera clear.  ENT: No nasal discharge; airway clear.  NECK: Supple; non tender.  CARD: S1, S2 normal; no murmurs, gallops, or rubs. Regular rate and rhythm.  RESP: No wheezes, rales or rhonchi.  ABD: Normal bowel sounds; soft; non-distended; non-tender; no hepatosplenomegaly.  EXT: LEFT KNEE: diffuse pain to palpation, no focal area, ROM intact, sensation intact (patient will not remove pants for exam), no blood on jeans, no swelling felt, no bony tenderness in ankle.  OTHER: Normal ROM. No clubbing, cyanosis or edema.  NEURO: Alert, oriented. Grossly unremarkable.  PSYCH: Cooperative, appropriate. VITAL SIGNS: I have reviewed nursing notes and confirm.  CONSTITUTIONAL: Well-developed; well-nourished; in no acute distress.  SKIN: Skin is warm and dry, no acute rash.  HEAD: Normocephalic; atraumatic.  EYES: PERRL, EOM intact; conjunctiva and sclera clear.  ENT: No nasal discharge; airway clear.  NECK: Supple; non tender.  CARD: S1, S2 normal; no murmurs, gallops, or rubs. Regular rate and rhythm.  RESP: No wheezes, rales or rhonchi.  ABD: Normal bowel sounds; soft; non-distended; non-tender; no hepatosplenomegaly.  EXT: LEFT KNEE: diffuse pain to palpation, no patella tenderness, no focal area, ROM intact, sensation intact (patient will not remove pants for exam), no blood on jeans, no swelling felt, no bony tenderness in ankle.  OTHER: Normal ROM. No clubbing, cyanosis or edema.  NEURO: Alert, oriented. Grossly unremarkable.  PSYCH: Cooperative, appropriate. VITAL SIGNS: I have reviewed nursing notes and confirm.  CONSTITUTIONAL: Well-developed; well-nourished; in no acute distress.  SKIN: Skin is warm and dry, no acute rash.  HEAD: Normocephalic; atraumatic.  EYES: PERRL, EOM intact; conjunctiva and sclera clear.  ENT: No nasal discharge; airway clear.  NECK: Supple; non tender.  CARD: S1, S2 normal; no murmurs, gallops, or rubs. Regular rate and rhythm.  RESP: No wheezes, rales or rhonchi.  ABD: Normal bowel sounds; soft; non-distended; non-tender; no hepatosplenomegaly.  EXT: LEFT KNEE: diffuse pain to palpation, no patella tenderness, no focal area, ROM intact, sensation intact (patient will not remove pants for exam), no blood on jeans, no swelling felt,  LEFT ANKLE: no bony tenderness in ankle, full ROM sensation/pulses intact.  OTHER: Normal ROM. No clubbing, cyanosis or edema.  NEURO: Alert, oriented. Grossly unremarkable.  PSYCH: Cooperative, appropriate.

## 2018-02-25 NOTE — ED PROVIDER NOTE - CPE EDP MUSC NORM
Alert and oriented to person, place, time/situation. normal mood and affect. no apparent risk to self or others.
normal...

## 2018-03-18 NOTE — ED ADULT NURSE NOTE - FINAL NURSING ELECTRONIC SIGNATURE
Problem: Fall Risk (Adult)  Goal: Identify Related Risk Factors and Signs and Symptoms   03/17/18 0330   Fall Risk (Adult)   Related Risk Factors (Fall Risk) environment unfamiliar   Signs and Symptoms (Fall Risk) presence of risk factors          02-Aug-2017 17:40

## 2018-07-10 ENCOUNTER — EMERGENCY (EMERGENCY)
Facility: HOSPITAL | Age: 53
LOS: 1 days | Discharge: HOME | End: 2018-07-10
Attending: EMERGENCY MEDICINE | Admitting: EMERGENCY MEDICINE
Payer: MEDICARE

## 2018-07-10 VITALS
SYSTOLIC BLOOD PRESSURE: 163 MMHG | TEMPERATURE: 98 F | DIASTOLIC BLOOD PRESSURE: 98 MMHG | OXYGEN SATURATION: 99 % | RESPIRATION RATE: 16 BRPM | WEIGHT: 173.06 LBS | HEART RATE: 88 BPM

## 2018-07-10 VITALS
RESPIRATION RATE: 17 BRPM | OXYGEN SATURATION: 99 % | DIASTOLIC BLOOD PRESSURE: 88 MMHG | SYSTOLIC BLOOD PRESSURE: 159 MMHG

## 2018-07-10 DIAGNOSIS — E78.5 HYPERLIPIDEMIA, UNSPECIFIED: ICD-10-CM

## 2018-07-10 DIAGNOSIS — I10 ESSENTIAL (PRIMARY) HYPERTENSION: ICD-10-CM

## 2018-07-10 DIAGNOSIS — F17.200 NICOTINE DEPENDENCE, UNSPECIFIED, UNCOMPLICATED: ICD-10-CM

## 2018-07-10 DIAGNOSIS — B35.3 TINEA PEDIS: ICD-10-CM

## 2018-07-10 DIAGNOSIS — M79.672 PAIN IN LEFT FOOT: ICD-10-CM

## 2018-07-10 DIAGNOSIS — I25.10 ATHEROSCLEROTIC HEART DISEASE OF NATIVE CORONARY ARTERY WITHOUT ANGINA PECTORIS: ICD-10-CM

## 2018-07-10 DIAGNOSIS — Z91.018 ALLERGY TO OTHER FOODS: ICD-10-CM

## 2018-07-10 DIAGNOSIS — K64.8 OTHER HEMORRHOIDS: Chronic | ICD-10-CM

## 2018-07-10 DIAGNOSIS — Z79.82 LONG TERM (CURRENT) USE OF ASPIRIN: ICD-10-CM

## 2018-07-10 DIAGNOSIS — M79.671 PAIN IN RIGHT FOOT: ICD-10-CM

## 2018-07-10 DIAGNOSIS — Z79.51 LONG TERM (CURRENT) USE OF INHALED STEROIDS: ICD-10-CM

## 2018-07-10 DIAGNOSIS — J45.909 UNSPECIFIED ASTHMA, UNCOMPLICATED: ICD-10-CM

## 2018-07-10 DIAGNOSIS — Z88.8 ALLERGY STATUS TO OTHER DRUGS, MEDICAMENTS AND BIOLOGICAL SUBSTANCES STATUS: ICD-10-CM

## 2018-07-10 DIAGNOSIS — Z88.1 ALLERGY STATUS TO OTHER ANTIBIOTIC AGENTS STATUS: ICD-10-CM

## 2018-07-10 PROBLEM — J44.9 CHRONIC OBSTRUCTIVE PULMONARY DISEASE, UNSPECIFIED: Chronic | Status: ACTIVE | Noted: 2017-08-02

## 2018-07-10 PROCEDURE — 99283 EMERGENCY DEPT VISIT LOW MDM: CPT

## 2018-07-10 RX ORDER — LIDOCAINE AND PRILOCAINE CREAM 25; 25 MG/G; MG/G
1 CREAM TOPICAL ONCE
Qty: 0 | Refills: 0 | Status: COMPLETED | OUTPATIENT
Start: 2018-07-10 | End: 2018-07-10

## 2018-07-10 RX ORDER — FLUCONAZOLE 150 MG/1
150 TABLET ORAL ONCE
Qty: 0 | Refills: 0 | Status: COMPLETED | OUTPATIENT
Start: 2018-07-10 | End: 2018-07-10

## 2018-07-10 RX ORDER — IBUPROFEN 200 MG
1 TABLET ORAL
Qty: 30 | Refills: 0 | OUTPATIENT
Start: 2018-07-10

## 2018-07-10 RX ORDER — OXYCODONE AND ACETAMINOPHEN 5; 325 MG/1; MG/1
2 TABLET ORAL ONCE
Qty: 0 | Refills: 0 | Status: DISCONTINUED | OUTPATIENT
Start: 2018-07-10 | End: 2018-07-10

## 2018-07-10 RX ORDER — IBUPROFEN 200 MG
600 TABLET ORAL ONCE
Qty: 0 | Refills: 0 | Status: COMPLETED | OUTPATIENT
Start: 2018-07-10 | End: 2018-07-10

## 2018-07-10 RX ORDER — NYSTATIN CREAM 100000 [USP'U]/G
1 CREAM TOPICAL
Qty: 30 | Refills: 0 | OUTPATIENT
Start: 2018-07-10 | End: 2018-07-23

## 2018-07-10 RX ORDER — OXYCODONE HYDROCHLORIDE 5 MG/1
1 TABLET ORAL
Qty: 16 | Refills: 0 | OUTPATIENT
Start: 2018-07-10 | End: 2018-07-13

## 2018-07-10 RX ADMIN — LIDOCAINE AND PRILOCAINE CREAM 1 APPLICATION(S): 25; 25 CREAM TOPICAL at 10:02

## 2018-07-10 RX ADMIN — OXYCODONE AND ACETAMINOPHEN 2 TABLET(S): 5; 325 TABLET ORAL at 10:01

## 2018-07-10 RX ADMIN — FLUCONAZOLE 150 MILLIGRAM(S): 150 TABLET ORAL at 10:01

## 2018-07-10 RX ADMIN — Medication 600 MILLIGRAM(S): at 10:01

## 2018-07-10 NOTE — ED PROVIDER NOTE - OBJECTIVE STATEMENT
53 F here with b/l foot pain x a a few days. Has broken skin between 1st /2nf toes. Tried taking an oxycodone last night and took Ibu 400mg. No redness. + fells warm. Says it hard to talk 2/2 pain. Takes Neurontin and Oxy 30mng for chronic pain/ 53 F here with b/l foot pain x a a few days. Has broken skin between 1st /2nf toes. Tried taking an oxycodone last night and took Ibu 400mg. No redness. + fells warm. Says it hard to walk 2/2 pain. Takes Neurontin and Oxy 30mng for chronic pain. 53 F here with b/l foot pain x a few days. Has broken skin between 1st /2nd toes. Started after wearing flip-flops. Tried taking an oxycodone last night and took Ibu 400mg. No redness. + fells warm. Says it hard to walk 2/2 pain. Takes Neurontin and Oxy 30mg for chronic pain.

## 2018-07-10 NOTE — ED PROVIDER NOTE - PROGRESS NOTE DETAILS
Pain a bit better with Po meds, says Brittani didn't; help. Has family member to help her home. Will d/c.

## 2018-07-10 NOTE — ED PROVIDER NOTE - CONSTITUTIONAL, MLM
normal... Well appearing, well nourished, awake, alert, oriented to person, place, time/situation and shoes off, feet elevated

## 2018-07-10 NOTE — ED ADULT TRIAGE NOTE - CHIEF COMPLAINT QUOTE
c/o bilat foot pain since saturdaty. pain is mostly in first and second digits. pt has blisters under toe on both feet. c/o numbness.

## 2018-07-10 NOTE — ED PROVIDER NOTE - PMH
Anxiety    Arthritis    Asthma    CAD (coronary artery disease)    Chronic pain    COPD (chronic obstructive pulmonary disease)    Depression    Heart disease    HLD (hyperlipidemia)    HTN (hypertension)    NUZHAT (obstructive sleep apnea)    Suicide attempt    Tobacco abuse    Trichotillomania in adult Anxiety    Arthritis    Asthma    CAD (coronary artery disease)    Cerebrovascular accident (CVA), unspecified mechanism    Chronic pain    Colitis    COPD (chronic obstructive pulmonary disease)    Depression    Heart disease    HLD (hyperlipidemia)    HTN (hypertension)    Myocardial infarction, unspecified MI type, unspecified artery    NUZHAT (obstructive sleep apnea)    Suicide attempt    Tobacco abuse    Trichotillomania in adult

## 2018-07-10 NOTE — ED PROVIDER NOTE - MEDICAL DECISION MAKING DETAILS
Patient presenting with foot pain and skin breakdown ,ost consistent with tinea pedis. Will give PO pain meds, topical Emla and PO Diflucan here. Home rx for topical antifungal.

## 2019-02-06 ENCOUNTER — EMERGENCY (EMERGENCY)
Facility: HOSPITAL | Age: 54
LOS: 1 days | Discharge: HOME | End: 2019-02-06
Admitting: EMERGENCY MEDICINE
Payer: MEDICARE

## 2019-02-06 VITALS
TEMPERATURE: 98 F | DIASTOLIC BLOOD PRESSURE: 107 MMHG | WEIGHT: 179.02 LBS | RESPIRATION RATE: 18 BRPM | OXYGEN SATURATION: 98 % | SYSTOLIC BLOOD PRESSURE: 179 MMHG | HEART RATE: 85 BPM

## 2019-02-06 DIAGNOSIS — R21 RASH AND OTHER NONSPECIFIC SKIN ERUPTION: ICD-10-CM

## 2019-02-06 DIAGNOSIS — Z79.1 LONG TERM (CURRENT) USE OF NON-STEROIDAL ANTI-INFLAMMATORIES (NSAID): ICD-10-CM

## 2019-02-06 DIAGNOSIS — Z79.82 LONG TERM (CURRENT) USE OF ASPIRIN: ICD-10-CM

## 2019-02-06 DIAGNOSIS — I10 ESSENTIAL (PRIMARY) HYPERTENSION: ICD-10-CM

## 2019-02-06 DIAGNOSIS — K64.8 OTHER HEMORRHOIDS: Chronic | ICD-10-CM

## 2019-02-06 DIAGNOSIS — Z88.1 ALLERGY STATUS TO OTHER ANTIBIOTIC AGENTS STATUS: ICD-10-CM

## 2019-02-06 DIAGNOSIS — L50.0 ALLERGIC URTICARIA: ICD-10-CM

## 2019-02-06 DIAGNOSIS — Z88.8 ALLERGY STATUS TO OTHER DRUGS, MEDICAMENTS AND BIOLOGICAL SUBSTANCES: ICD-10-CM

## 2019-02-06 DIAGNOSIS — E78.5 HYPERLIPIDEMIA, UNSPECIFIED: ICD-10-CM

## 2019-02-06 DIAGNOSIS — I25.10 ATHEROSCLEROTIC HEART DISEASE OF NATIVE CORONARY ARTERY WITHOUT ANGINA PECTORIS: ICD-10-CM

## 2019-02-06 DIAGNOSIS — F17.200 NICOTINE DEPENDENCE, UNSPECIFIED, UNCOMPLICATED: ICD-10-CM

## 2019-02-06 DIAGNOSIS — Z79.891 LONG TERM (CURRENT) USE OF OPIATE ANALGESIC: ICD-10-CM

## 2019-02-06 DIAGNOSIS — J45.909 UNSPECIFIED ASTHMA, UNCOMPLICATED: ICD-10-CM

## 2019-02-06 DIAGNOSIS — Z91.018 ALLERGY TO OTHER FOODS: ICD-10-CM

## 2019-02-06 PROCEDURE — 99283 EMERGENCY DEPT VISIT LOW MDM: CPT

## 2019-02-06 RX ORDER — CETIRIZINE HYDROCHLORIDE 10 MG/1
1 TABLET ORAL
Qty: 20 | Refills: 0 | OUTPATIENT
Start: 2019-02-06

## 2019-02-06 RX ORDER — DEXAMETHASONE 0.5 MG/5ML
10 ELIXIR ORAL ONCE
Qty: 0 | Refills: 0 | Status: COMPLETED | OUTPATIENT
Start: 2019-02-06 | End: 2019-02-06

## 2019-02-06 RX ORDER — LORATADINE 10 MG/1
10 TABLET ORAL ONCE
Qty: 0 | Refills: 0 | Status: COMPLETED | OUTPATIENT
Start: 2019-02-06 | End: 2019-02-06

## 2019-02-06 RX ADMIN — LORATADINE 10 MILLIGRAM(S): 10 TABLET ORAL at 19:33

## 2019-02-06 RX ADMIN — Medication 10 MILLIGRAM(S): at 19:32

## 2019-02-06 NOTE — ED PROVIDER NOTE - PHYSICAL EXAMINATION
Vital Signs - nursing notes reviewed and confirmed  Gen - WDWN F, NAD, comfortable and non-toxic appearing, speaking in full sentences   Skin - warm, dry, intact, small amount of urticarial pruritic rash involving BUE/BLE and chest region, no desquamation of the skin, crepitus, fluctuance, vesicles, warmth or streaking   HEENT - AT/NC, PERRL, EOMI, no conjunctival injection, moist oral mucosa, TM intact b/l with good cone of lights, o/p clear with no erythema, edema, or exudate, uvula midline, airway patent, neck supple and NT, FROM  CV - S1S2, R/R/R  Resp - respiration non-labored, CTAB, symmetric bs b/l, no r/r/w  GI - NABS, soft, ND, NT, no rebound or guarding, no CVAT b/l   MS - w/w/p, no c/c/e, calves supple and NT, distal pulses symmetric b/l, brisk cap refills, +SILT  Neuro - AxOx3, no focal neuro deficits, ambulatory without gait disturbance

## 2019-02-06 NOTE — ED PROVIDER NOTE - MEDICAL DECISION MAKING DETAILS
pt p/w generalized urticarial rash after taking tylenol 3 liquid form for the first time today, no airway involvement, phonating well, no respiratory distress, AFVSS at time of d/c, pt non-toxic appearing, results, ddx, and f/u plans discussed with pt at bedside, d/c'd home to f/u with PMD, strict return precautions discussed, prompt return to ER for any worsening or new sx, pt verbalized understanding. pt p/w generalized urticarial rash after taking tylenol 3 liquid form for the first time today, no airway involvement, phonating well, no respiratory distress, sx improved s/p decadron, monitoring here with improvement and no relapse of sx, AFVSS at time of d/c, pt non-toxic appearing, results, ddx, and f/u plans discussed with pt at bedside, d/c'd home to f/u with PMD and derm, strict return precautions discussed, prompt return to ER for any worsening or new sx, pt verbalized understanding.

## 2019-02-06 NOTE — ED PROVIDER NOTE - CARE PROVIDER_API CALL
Guerda Carrlilo)  Dermatology  [address] [place] [address], [place] [address]  Phone: [phone]  Fax: [phone]  Follow Up Time:

## 2019-02-06 NOTE — ED PROVIDER NOTE - PMH
Anxiety    Arthritis    Asthma    CAD (coronary artery disease)    Cerebrovascular accident (CVA), unspecified mechanism    Chronic pain    Colitis    COPD (chronic obstructive pulmonary disease)    Depression    Heart disease    HLD (hyperlipidemia)    HTN (hypertension)    Myocardial infarction, unspecified MI type, unspecified artery    NUZHAT (obstructive sleep apnea)    Suicide attempt    Tobacco abuse    Trichotillomania in adult

## 2019-02-06 NOTE — ED ADULT NURSE NOTE - CHPI ED NUR SYMPTOMS NEG
no rash/no swelling of face, tongue/no throat itching/no difficulty breathing/no wheezing/no nausea/no difficulty swallowing/no shortness of breath/no vomiting/no congestion

## 2019-02-06 NOTE — ED ADULT TRIAGE NOTE - CHIEF COMPLAINT QUOTE
here for allergic reaction s/p taking tylenol with codeine (liquid form)- pt c/o hives and itchiness to body- pt took her own epi and was given 50mg IM of benadryl by EMS- pt states the itchiness has improved- Pt denies difficulty breathing, swallowing or throat closing

## 2019-02-06 NOTE — ED PROVIDER NOTE - OBJECTIVE STATEMENT
52 yo F with PMHx of CAD, COPD, CVA, HTN, anxiety, depression, colitis, and chronic neck and back pain, which pt takes tylenol #3 tablets routinely, BIBA for generalized itching rash after taking tylenol #3 liquid form for the first time today.  Noted breaking out in hives after taking the liquid form this afternoon, gave herself epipen around 5:30pm and reports significant improvement in sx.  Now with persistent itching. Denies fever, chills, SOB, CP, palpitations, wheezing, stridors, tongue/lip swelling, focal weakness, HA, dizziness, N/V/D/C, oral/genital lesions, cough, paresthesia, numbness, tingling, malaise, and diaphoresis.  No other new products/meds/food reported.

## 2019-02-06 NOTE — ED ADULT NURSE NOTE - OBJECTIVE STATEMENT
Pt BIBA s/p giving herself IM EPI after developing generalized body hives with itchiness and throat tightness x2 hours, took Tylenol with codeine liquid form for the first time. Pt given 50mg IM benadryl by EMS PTA. Pt reports symptoms have resolved by arrival to ED. Denies any difficulty breathing, no SOB, no throat swelling, no angioedema.

## 2019-02-06 NOTE — ED ADULT NURSE NOTE - NSIMPLEMENTINTERV_GEN_ALL_ED
Implemented All Universal Safety Interventions:  [place] to call system. Call bell, personal items and telephone within reach. Instruct patient to call for assistance. Room bathroom lighting operational. Non-slip footwear when patient is off stretcher. Physically safe environment: no spills, clutter or unnecessary equipment. Stretcher in lowest position, wheels locked, appropriate side rails in place.

## 2019-02-07 PROBLEM — I63.9 CEREBRAL INFARCTION, UNSPECIFIED: Chronic | Status: ACTIVE | Noted: 2018-07-10

## 2019-02-07 PROBLEM — I25.10 ATHEROSCLEROTIC HEART DISEASE OF NATIVE CORONARY ARTERY WITHOUT ANGINA PECTORIS: Chronic | Status: ACTIVE | Noted: 2017-08-02

## 2019-02-07 PROBLEM — I21.9 ACUTE MYOCARDIAL INFARCTION, UNSPECIFIED: Chronic | Status: ACTIVE | Noted: 2018-07-10

## 2019-02-07 PROBLEM — F63.3 TRICHOTILLOMANIA: Chronic | Status: ACTIVE | Noted: 2017-08-02

## 2019-02-07 PROBLEM — G47.33 OBSTRUCTIVE SLEEP APNEA (ADULT) (PEDIATRIC): Chronic | Status: ACTIVE | Noted: 2017-08-02

## 2019-02-07 PROBLEM — M19.90 UNSPECIFIED OSTEOARTHRITIS, UNSPECIFIED SITE: Chronic | Status: ACTIVE | Noted: 2017-12-12

## 2019-02-07 PROBLEM — Z72.0 TOBACCO USE: Chronic | Status: ACTIVE | Noted: 2017-08-02

## 2019-02-07 PROBLEM — E78.5 HYPERLIPIDEMIA, UNSPECIFIED: Chronic | Status: ACTIVE | Noted: 2017-08-02

## 2019-02-07 PROBLEM — F32.9 MAJOR DEPRESSIVE DISORDER, SINGLE EPISODE, UNSPECIFIED: Chronic | Status: ACTIVE | Noted: 2017-08-02

## 2019-02-07 PROBLEM — T14.91 SUICIDE ATTEMPT: Chronic | Status: ACTIVE | Noted: 2017-08-02

## 2019-02-07 PROBLEM — K52.9 NONINFECTIVE GASTROENTERITIS AND COLITIS, UNSPECIFIED: Chronic | Status: ACTIVE | Noted: 2018-07-10

## 2019-04-22 ENCOUNTER — EMERGENCY (EMERGENCY)
Facility: HOSPITAL | Age: 54
LOS: 1 days | Discharge: HOME | End: 2019-04-22
Attending: EMERGENCY MEDICINE | Admitting: EMERGENCY MEDICINE
Payer: MEDICARE

## 2019-04-22 VITALS
DIASTOLIC BLOOD PRESSURE: 100 MMHG | TEMPERATURE: 98 F | SYSTOLIC BLOOD PRESSURE: 144 MMHG | HEART RATE: 91 BPM | OXYGEN SATURATION: 95 % | RESPIRATION RATE: 16 BRPM

## 2019-04-22 VITALS
RESPIRATION RATE: 16 BRPM | TEMPERATURE: 98 F | SYSTOLIC BLOOD PRESSURE: 138 MMHG | HEART RATE: 87 BPM | OXYGEN SATURATION: 94 % | DIASTOLIC BLOOD PRESSURE: 83 MMHG

## 2019-04-22 DIAGNOSIS — K64.8 OTHER HEMORRHOIDS: Chronic | ICD-10-CM

## 2019-04-22 LAB
ALBUMIN SERPL ELPH-MCNC: 3.1 G/DL — LOW (ref 3.4–5)
ALBUMIN SERPL ELPH-MCNC: 3.2 G/DL — LOW (ref 3.4–5)
ALP SERPL-CCNC: 80 U/L — SIGNIFICANT CHANGE UP (ref 40–120)
ALP SERPL-CCNC: SIGNIFICANT CHANGE UP U/L (ref 40–120)
ALT FLD-CCNC: 46 U/L — HIGH (ref 12–42)
ALT FLD-CCNC: 50 U/L — HIGH (ref 12–42)
ANION GAP SERPL CALC-SCNC: 8 MMOL/L — LOW (ref 9–16)
ANION GAP SERPL CALC-SCNC: 9 MMOL/L — SIGNIFICANT CHANGE UP (ref 9–16)
AST SERPL-CCNC: 23 U/L — SIGNIFICANT CHANGE UP (ref 15–37)
AST SERPL-CCNC: 75 U/L — HIGH (ref 15–37)
BILIRUB SERPL-MCNC: 0.1 MG/DL — LOW (ref 0.2–1.2)
BILIRUB SERPL-MCNC: 0.4 MG/DL — SIGNIFICANT CHANGE UP (ref 0.2–1.2)
BUN SERPL-MCNC: 18 MG/DL — SIGNIFICANT CHANGE UP (ref 7–23)
BUN SERPL-MCNC: 18 MG/DL — SIGNIFICANT CHANGE UP (ref 7–23)
CALCIUM SERPL-MCNC: 8.8 MG/DL — SIGNIFICANT CHANGE UP (ref 8.5–10.5)
CALCIUM SERPL-MCNC: 9.2 MG/DL — SIGNIFICANT CHANGE UP (ref 8.5–10.5)
CHLORIDE SERPL-SCNC: 110 MMOL/L — HIGH (ref 96–108)
CHLORIDE SERPL-SCNC: 112 MMOL/L — HIGH (ref 96–108)
CO2 SERPL-SCNC: 21 MMOL/L — LOW (ref 22–31)
CO2 SERPL-SCNC: 24 MMOL/L — SIGNIFICANT CHANGE UP (ref 22–31)
CREAT SERPL-MCNC: 0.52 MG/DL — SIGNIFICANT CHANGE UP (ref 0.5–1.3)
CREAT SERPL-MCNC: 0.55 MG/DL — SIGNIFICANT CHANGE UP (ref 0.5–1.3)
GLUCOSE SERPL-MCNC: 101 MG/DL — HIGH (ref 70–99)
GLUCOSE SERPL-MCNC: 105 MG/DL — HIGH (ref 70–99)
HCT VFR BLD CALC: 40.2 % — SIGNIFICANT CHANGE UP (ref 34.5–45)
HGB BLD-MCNC: 13.1 G/DL — SIGNIFICANT CHANGE UP (ref 11.5–15.5)
MAGNESIUM SERPL-MCNC: 1.7 MG/DL — SIGNIFICANT CHANGE UP (ref 1.6–2.6)
MCHC RBC-ENTMCNC: 28.7 PG — SIGNIFICANT CHANGE UP (ref 27–34)
MCHC RBC-ENTMCNC: 32.6 G/DL — SIGNIFICANT CHANGE UP (ref 32–36)
MCV RBC AUTO: 88.2 FL — SIGNIFICANT CHANGE UP (ref 80–100)
PLATELET # BLD AUTO: 183 K/UL — SIGNIFICANT CHANGE UP (ref 150–400)
POTASSIUM SERPL-MCNC: 3.8 MMOL/L — SIGNIFICANT CHANGE UP (ref 3.5–5.3)
POTASSIUM SERPL-MCNC: 5.8 MMOL/L — HIGH (ref 3.5–5.3)
POTASSIUM SERPL-SCNC: 3.8 MMOL/L — SIGNIFICANT CHANGE UP (ref 3.5–5.3)
POTASSIUM SERPL-SCNC: 5.8 MMOL/L — HIGH (ref 3.5–5.3)
PROT SERPL-MCNC: 6.8 G/DL — SIGNIFICANT CHANGE UP (ref 6.4–8.2)
PROT SERPL-MCNC: 7.3 G/DL — SIGNIFICANT CHANGE UP (ref 6.4–8.2)
RBC # BLD: 4.56 M/UL — SIGNIFICANT CHANGE UP (ref 3.8–5.2)
RBC # FLD: 13.6 % — SIGNIFICANT CHANGE UP (ref 10.3–14.5)
SODIUM SERPL-SCNC: 142 MMOL/L — SIGNIFICANT CHANGE UP (ref 132–145)
SODIUM SERPL-SCNC: 142 MMOL/L — SIGNIFICANT CHANGE UP (ref 132–145)
TROPONIN I SERPL-MCNC: <0.017 NG/ML — LOW (ref 0.02–0.06)
WBC # BLD: 6.3 K/UL — SIGNIFICANT CHANGE UP (ref 3.8–10.5)
WBC # FLD AUTO: 6.3 K/UL — SIGNIFICANT CHANGE UP (ref 3.8–10.5)

## 2019-04-22 PROCEDURE — 93010 ELECTROCARDIOGRAM REPORT: CPT

## 2019-04-22 PROCEDURE — 99284 EMERGENCY DEPT VISIT MOD MDM: CPT | Mod: 25

## 2019-04-22 PROCEDURE — 70450 CT HEAD/BRAIN W/O DYE: CPT | Mod: 26

## 2019-04-22 RX ORDER — SODIUM CHLORIDE 9 MG/ML
1000 INJECTION INTRAMUSCULAR; INTRAVENOUS; SUBCUTANEOUS ONCE
Qty: 0 | Refills: 0 | Status: COMPLETED | OUTPATIENT
Start: 2019-04-22 | End: 2019-04-22

## 2019-04-22 RX ADMIN — SODIUM CHLORIDE 1000 MILLILITER(S): 9 INJECTION INTRAMUSCULAR; INTRAVENOUS; SUBCUTANEOUS at 20:40

## 2019-04-22 NOTE — ED ADULT TRIAGE NOTE - CHIEF COMPLAINT QUOTE
Patient complaining of left side chest pain and numbness since yesterday, also reports bleeding from rectum , h/o hemorrhoids

## 2019-04-22 NOTE — ED PROVIDER NOTE - CHPI ED SYMPTOMS NEG
no diaphoresis/no abdominal pain, no blurry vision, no aphagia/no shortness of breath/no vomiting/no nausea/no syncope

## 2019-04-22 NOTE — ED ADULT NURSE NOTE - CHPI ED NUR SYMPTOMS NEG
no blurred vision/no loss of consciousness/no dizziness/no numbness/no change in level of consciousness/no vomiting/no weakness/no confusion/no nausea/no fever

## 2019-04-22 NOTE — ED PROVIDER NOTE - CLINICAL SUMMARY MEDICAL DECISION MAKING FREE TEXT BOX
54 yo female with multiple comorbidities presents with intermittent LUE numbness the past 36 hrs, with additional left sided headache and dizziness. Hx of CVA. States "my left leg doesn't work like it used to". Endorses compliance with all meds. In addition, reports bright red blood per rectum since today. Appears comfortable and well. BP elevated on arrival, otherwise no other VS derangements. Mild LUE and LLE weakness approximately 4-4.5 out of 5, clear speech, no facial asymmetry, steady gait. CT brain ordered (pt outside window for CVA protocol), basic labs, EKG, and cardiac markers ordered. 52 yo female with multiple comorbidities presents with intermittent LUE numbness the past 36 hrs, with additional left sided headache and dizziness. Hx of CVA and States "my left leg doesn't work like it used to". Endorses compliance with all meds. In addition, reports bright red blood per rectum since today. Appears comfortable and well. BP elevated on arrival, otherwise no other VS derangements. Mild LUE and LLE weakness approximately 4-4.5 out of 5 (baseline as per patient), clear speech, no facial asymmetry, steady gait. CT brain ordered (pt outside window for Stroke protocol), basic labs, EKG, and cardiac markers ordered.

## 2019-04-22 NOTE — ED PROVIDER NOTE - OBJECTIVE STATEMENT
53 y o female with PMHx of CVA (2017 - taking daily ASA), HLD, HTN (HCTZ, Metoprolol), Vertigo, CAD, COPD, anxiety, depression, colitis, chronic neck pain, and chronic back pain was BIBA for c/o left sided arm, chest, and back pain since yesterday at 12pm. Pt was standing in [employer] when she felt sudden left hand tightness that radiated up her arm, chest, and back. Reports associated SOB, dizziness, and left sided headache. This morning, pt woke up with worsened left hand pain/numbness. When using the bathroom noted rectal bleeding that progressed and worsened after each use. Pt endorsed seeing blood clots after wiping. Pt currently feeling generally weak, worse on her left extremities. No fevers, chills, N/V/D, abdominal pain, syncope, blurry vision, aphagia, current SOB, or other sx. 53 y o female with PMHx of CVA (2017 - taking daily ASA), HLD, HTN (HCTZ, Metoprolol), Vertigo, CAD, COPD, anxiety, depression, colitis, chronic neck pain, and chronic back pain was BIBA for c/o left sided arm, chest, and back pain since yesterday at 12pm. Pt was standing in [employer] when she felt sudden left hand tightness that radiated up her arm, chest, and back. Reports associated SOB, dizziness, and left sided headache. This morning, pt woke up with worsened left hand pain/numbness. When using the bathroom noted rectal bleeding that progressed and worsened after each use.  Pt currently feeling generally weak, worse on her left extremities. No fevers, chills, N/V/D, abdominal pain, syncope, blurry vision, aphagia, current SOB, or other sx.  Last colonoscopy about 6 years ago and endorses polyp removal and history of hemorrhoid banding.

## 2019-04-22 NOTE — ED PROVIDER NOTE - NEUROLOGICAL, MLM
Alert and oriented, no focal deficits. Pt speaking in clear full sentences. No facial asymmetry. Steady gait.

## 2019-04-22 NOTE — ED PROVIDER NOTE - PMH
Anxiety    Arthritis    Asthma    CAD (coronary artery disease)    Cerebrovascular accident (CVA), unspecified mechanism    Chronic pain    Colitis    COPD (chronic obstructive pulmonary disease)    Depression    Heart disease    HLD (hyperlipidemia)    HTN (hypertension)    Myocardial infarction, unspecified MI type, unspecified artery    NUZHAT (obstructive sleep apnea)    Suicide attempt    Tobacco abuse    Trichotillomania in adult    Vertigo

## 2019-04-22 NOTE — ED PROVIDER NOTE - MUSCULOSKELETAL, MLM
Spine appears normal, range of motion is not limited. +Mild LUE and LLE weakness approximately 4-4.5 out of 5. Spine appears normal, range of motion is not limited. LUE and LLE weakness approximately 4-4.5 out of 5 (patient states is old). Sensory grossly normal. clear and coherent speech, normal cranial nerve exam, normal finger to nose and DEYSI.  Steady gait.  No pronator drift.

## 2019-04-22 NOTE — ED PROVIDER NOTE - PROGRESS NOTE DETAILS
CT, EKG, labs reviewed.  No evidence of anemia or rectal bleeding on exam.  Few non thrombosed hemorrhoids present.  Recommend GI follow up for colonoscopy.  CT brain unremarkable and no acute new neurological findings on examination.  BP improved without intervention.  Conservative management discussed with the patient in detail.  Close PMD follow up encouraged.  Strict ED return instructions discussed in detail and patient given the opportunity to ask any questions about their discharge diagnosis and instructions

## 2019-04-26 DIAGNOSIS — Z79.899 OTHER LONG TERM (CURRENT) DRUG THERAPY: ICD-10-CM

## 2019-04-26 DIAGNOSIS — R07.89 OTHER CHEST PAIN: ICD-10-CM

## 2019-04-26 DIAGNOSIS — Z79.52 LONG TERM (CURRENT) USE OF SYSTEMIC STEROIDS: ICD-10-CM

## 2019-04-26 DIAGNOSIS — Z88.8 ALLERGY STATUS TO OTHER DRUGS, MEDICAMENTS AND BIOLOGICAL SUBSTANCES: ICD-10-CM

## 2019-04-26 DIAGNOSIS — I25.10 ATHEROSCLEROTIC HEART DISEASE OF NATIVE CORONARY ARTERY WITHOUT ANGINA PECTORIS: ICD-10-CM

## 2019-04-26 DIAGNOSIS — K64.4 RESIDUAL HEMORRHOIDAL SKIN TAGS: ICD-10-CM

## 2019-04-26 DIAGNOSIS — Z88.1 ALLERGY STATUS TO OTHER ANTIBIOTIC AGENTS STATUS: ICD-10-CM

## 2019-04-26 DIAGNOSIS — Z79.891 LONG TERM (CURRENT) USE OF OPIATE ANALGESIC: ICD-10-CM

## 2019-04-26 DIAGNOSIS — I10 ESSENTIAL (PRIMARY) HYPERTENSION: ICD-10-CM

## 2019-04-26 DIAGNOSIS — Z91.018 ALLERGY TO OTHER FOODS: ICD-10-CM

## 2019-04-26 DIAGNOSIS — J44.9 CHRONIC OBSTRUCTIVE PULMONARY DISEASE, UNSPECIFIED: ICD-10-CM

## 2019-04-26 DIAGNOSIS — E78.5 HYPERLIPIDEMIA, UNSPECIFIED: ICD-10-CM

## 2019-04-26 DIAGNOSIS — Z79.1 LONG TERM (CURRENT) USE OF NON-STEROIDAL ANTI-INFLAMMATORIES (NSAID): ICD-10-CM

## 2019-07-13 NOTE — ED PROVIDER NOTE - DIAGNOSTIC INTERPRETATION
13-Jul-2019 ER Physician: Gianluca uMnoz  CHEST XRAY INTERPRETATION: lungs clear, heart shadow normal, bony structures intact

## 2020-01-12 PROBLEM — R42 DIZZINESS AND GIDDINESS: Chronic | Status: ACTIVE | Noted: 2019-04-22

## 2020-03-12 NOTE — H&P ADULT - PROBLEM SELECTOR PLAN 9
Blood Pressure Log:  Date Time SBP DBP BPM Notes                                                                                                                                                               Care Management/Social Work Discharge Arrangements: No need for help at home was identified during your hospital stay. If you feel you need more help please contact your primary care provider for additional resources.         
Pt states hx of anxiety w/ panic attacks. States takes diazepam, however, outpt verifier shows not rx since october 2016. Will ISTOP to verify.

## 2020-08-30 ENCOUNTER — EMERGENCY (EMERGENCY)
Facility: HOSPITAL | Age: 55
LOS: 1 days | Discharge: HOME | End: 2020-08-30
Attending: EMERGENCY MEDICINE | Admitting: EMERGENCY MEDICINE
Payer: MEDICARE

## 2020-08-30 VITALS
SYSTOLIC BLOOD PRESSURE: 148 MMHG | DIASTOLIC BLOOD PRESSURE: 106 MMHG | WEIGHT: 179.9 LBS | TEMPERATURE: 99 F | RESPIRATION RATE: 18 BRPM | HEART RATE: 91 BPM | OXYGEN SATURATION: 99 %

## 2020-08-30 DIAGNOSIS — Z79.891 LONG TERM (CURRENT) USE OF OPIATE ANALGESIC: ICD-10-CM

## 2020-08-30 DIAGNOSIS — K64.8 OTHER HEMORRHOIDS: Chronic | ICD-10-CM

## 2020-08-30 DIAGNOSIS — E78.5 HYPERLIPIDEMIA, UNSPECIFIED: ICD-10-CM

## 2020-08-30 DIAGNOSIS — Z88.1 ALLERGY STATUS TO OTHER ANTIBIOTIC AGENTS STATUS: ICD-10-CM

## 2020-08-30 DIAGNOSIS — I10 ESSENTIAL (PRIMARY) HYPERTENSION: ICD-10-CM

## 2020-08-30 DIAGNOSIS — M79.622 PAIN IN LEFT UPPER ARM: ICD-10-CM

## 2020-08-30 DIAGNOSIS — S44.92XS: ICD-10-CM
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
## 2020-08-30 DIAGNOSIS — J44.9 CHRONIC OBSTRUCTIVE PULMONARY DISEASE, UNSPECIFIED: ICD-10-CM

## 2020-08-30 DIAGNOSIS — Z79.82 LONG TERM (CURRENT) USE OF ASPIRIN: ICD-10-CM

## 2020-08-30 DIAGNOSIS — Z88.6 ALLERGY STATUS TO ANALGESIC AGENT: ICD-10-CM

## 2020-08-30 DIAGNOSIS — Z79.1 LONG TERM (CURRENT) USE OF NON-STEROIDAL ANTI-INFLAMMATORIES (NSAID): ICD-10-CM

## 2020-08-30 DIAGNOSIS — I25.10 ATHEROSCLEROTIC HEART DISEASE OF NATIVE CORONARY ARTERY WITHOUT ANGINA PECTORIS: ICD-10-CM

## 2020-08-30 PROCEDURE — 99283 EMERGENCY DEPT VISIT LOW MDM: CPT

## 2020-08-30 RX ORDER — OXYCODONE AND ACETAMINOPHEN 5; 325 MG/1; MG/1
1 TABLET ORAL ONCE
Refills: 0 | Status: DISCONTINUED | OUTPATIENT
Start: 2020-08-30 | End: 2020-08-30

## 2020-08-30 RX ADMIN — OXYCODONE AND ACETAMINOPHEN 1 TABLET(S): 5; 325 TABLET ORAL at 18:28

## 2020-08-30 RX ADMIN — Medication 40 MILLIGRAM(S): at 18:28

## 2020-08-30 NOTE — ED ADULT TRIAGE NOTE - CHIEF COMPLAINT QUOTE
pt got pneumonia vaccine on wednesday. Today she has a lot of pain in her left arm and is unable to move it easily with tingling to her hands.

## 2020-08-30 NOTE — ED PROVIDER NOTE - PATIENT PORTAL LINK FT
You can access the FollowMyHealth Patient Portal offered by [facility] by registering at the following website: [email or web address]. By joining [employer]’s FollowMyHealth portal, you will also be able to view your health information using other applications (apps) compatible with our system.

## 2020-08-30 NOTE — ED PROVIDER NOTE - OBJECTIVE STATEMENT
56 yo female c/o left arm pain since receiving pneumovax injection 5 days ago. States aching, no fever or rash. occassional tingling left hand. denies cp/soa.

## 2020-09-04 PROBLEM — Z00.00 ENCOUNTER FOR PREVENTIVE HEALTH EXAMINATION: Status: ACTIVE | Noted: 2020-09-04

## 2020-09-09 ENCOUNTER — RESULT REVIEW (OUTPATIENT)
Age: 55
End: 2020-09-09

## 2020-09-09 ENCOUNTER — APPOINTMENT (OUTPATIENT)
Dept: ORTHOPEDIC SURGERY | Facility: CLINIC | Age: 55
End: 2020-09-09
Payer: MEDICARE

## 2020-09-09 ENCOUNTER — OUTPATIENT (OUTPATIENT)
Dept: OUTPATIENT SERVICES | Facility: HOSPITAL | Age: 55
LOS: 1 days | End: 2020-09-09
Payer: MEDICARE

## 2020-09-09 VITALS — HEIGHT: 60 IN | BODY MASS INDEX: 36.12 KG/M2 | WEIGHT: 184 LBS

## 2020-09-09 DIAGNOSIS — Z86.79 PERSONAL HISTORY OF OTHER DISEASES OF THE CIRCULATORY SYSTEM: ICD-10-CM

## 2020-09-09 DIAGNOSIS — M75.112 INCOMPLETE ROTATOR CUFF TEAR OR RUPTURE OF LEFT SHOULDER, NOT SPECIFIED AS TRAUMATIC: ICD-10-CM

## 2020-09-09 DIAGNOSIS — M75.42 IMPINGEMENT SYNDROME OF LEFT SHOULDER: ICD-10-CM

## 2020-09-09 DIAGNOSIS — K64.8 OTHER HEMORRHOIDS: Chronic | ICD-10-CM

## 2020-09-09 DIAGNOSIS — Z82.61 FAMILY HISTORY OF ARTHRITIS: ICD-10-CM

## 2020-09-09 DIAGNOSIS — Z86.73 PERSONAL HISTORY OF TRANSIENT ISCHEMIC ATTACK (TIA), AND CEREBRAL INFARCTION W/OUT RESIDUAL DEFICITS: ICD-10-CM

## 2020-09-09 DIAGNOSIS — F17.200 NICOTINE DEPENDENCE, UNSPECIFIED, UNCOMPLICATED: ICD-10-CM

## 2020-09-09 DIAGNOSIS — Z80.9 FAMILY HISTORY OF MALIGNANT NEOPLASM, UNSPECIFIED: ICD-10-CM

## 2020-09-09 DIAGNOSIS — Z87.09 PERSONAL HISTORY OF OTHER DISEASES OF THE RESPIRATORY SYSTEM: ICD-10-CM

## 2020-09-09 DIAGNOSIS — Z86.39 PERSONAL HISTORY OF OTHER ENDOCRINE, NUTRITIONAL AND METABOLIC DISEASE: ICD-10-CM

## 2020-09-09 PROCEDURE — 73030 X-RAY EXAM OF SHOULDER: CPT

## 2020-09-09 PROCEDURE — 99203 OFFICE O/P NEW LOW 30 MIN: CPT

## 2020-09-09 PROCEDURE — 73030 X-RAY EXAM OF SHOULDER: CPT | Mod: 26,LT

## 2020-09-09 PROCEDURE — 72040 X-RAY EXAM NECK SPINE 2-3 VW: CPT | Mod: 26

## 2020-09-09 PROCEDURE — 72040 X-RAY EXAM NECK SPINE 2-3 VW: CPT

## 2020-09-09 RX ORDER — METOPROLOL TARTRATE 75 MG/1
TABLET, FILM COATED ORAL
Refills: 0 | Status: ACTIVE | COMMUNITY

## 2020-09-09 RX ORDER — MELOXICAM 15 MG/1
15 TABLET ORAL DAILY
Qty: 1 | Refills: 0 | Status: ACTIVE | COMMUNITY
Start: 2020-09-09 | End: 1900-01-01

## 2020-09-09 RX ORDER — ATORVASTATIN CALCIUM 40 MG/1
40 TABLET, FILM COATED ORAL
Refills: 0 | Status: ACTIVE | COMMUNITY

## 2020-09-09 RX ORDER — ALBUTEROL 90 MCG
AEROSOL (GRAM) INHALATION
Refills: 0 | Status: ACTIVE | COMMUNITY

## 2020-09-09 RX ORDER — LISINOPRIL 40 MG/1
40 TABLET ORAL
Refills: 0 | Status: ACTIVE | COMMUNITY

## 2020-09-09 RX ORDER — HYDROCHLOROTHIAZIDE 12.5 MG/1
12.5 TABLET ORAL
Refills: 0 | Status: ACTIVE | COMMUNITY

## 2020-11-05 ENCOUNTER — EMERGENCY (EMERGENCY)
Facility: HOSPITAL | Age: 55
LOS: 1 days | Discharge: HOME | End: 2020-11-05
Attending: EMERGENCY MEDICINE | Admitting: EMERGENCY MEDICINE
Payer: MEDICARE

## 2020-11-05 VITALS
SYSTOLIC BLOOD PRESSURE: 121 MMHG | OXYGEN SATURATION: 93 % | RESPIRATION RATE: 20 BRPM | HEIGHT: 61 IN | TEMPERATURE: 98 F | WEIGHT: 190.04 LBS | HEART RATE: 113 BPM | DIASTOLIC BLOOD PRESSURE: 83 MMHG

## 2020-11-05 DIAGNOSIS — K64.8 OTHER HEMORRHOIDS: Chronic | ICD-10-CM

## 2020-11-05 PROCEDURE — 99283 EMERGENCY DEPT VISIT LOW MDM: CPT

## 2020-11-05 RX ORDER — POLYMYXIN B SULF/TRIMETHOPRIM 10000-1/ML
2 DROPS OPHTHALMIC (EYE) ONCE
Refills: 0 | Status: COMPLETED | OUTPATIENT
Start: 2020-11-05 | End: 2020-11-05

## 2020-11-05 RX ORDER — GLYCERIN 1 %
1 DROPS OPHTHALMIC (EYE) ONCE
Refills: 0 | Status: COMPLETED | OUTPATIENT
Start: 2020-11-05 | End: 2020-11-05

## 2020-11-05 RX ORDER — GLYCERIN 1 %
2 DROPS OPHTHALMIC (EYE)
Qty: 1 | Refills: 0
Start: 2020-11-05 | End: 2020-11-11

## 2020-11-05 RX ORDER — POLYMYXIN B SULF/TRIMETHOPRIM 10000-1/ML
2 DROPS OPHTHALMIC (EYE)
Qty: 1 | Refills: 0
Start: 2020-11-05 | End: 2020-11-11

## 2020-11-05 RX ADMIN — Medication 2 DROP(S): at 13:31

## 2020-11-05 RX ADMIN — Medication 1 DROP(S): at 13:31

## 2020-11-05 NOTE — ED PROVIDER NOTE - CARE PROVIDER_API CALL
Jethro Mcguire  OPHTHALMOLOGY  [address] [address]  Phone: [phone]  Fax: [phone]  Follow Up Time:

## 2020-11-05 NOTE — ED PROVIDER NOTE - PATIENT PORTAL LINK FT
You can access the FollowMyHealth Patient Portal offered by [facility] by registering at the following website: [email or web address]. By joining [employer]’s FollowMyHealth portal, you will also be able to view your health information using other applications (apps) compatible with our system.

## 2020-11-05 NOTE — ED PROVIDER NOTE - OBJECTIVE STATEMENT
55 female pt, presents for B/L eye redness, suppuration this am. started yesterday w redness, no pain, no uri symptoms, nmo headache, no neck pain, no blurred vision, no trauma. no sick contacts. had her eyelashes done yesterday but eyelid not red or swollen

## 2020-11-05 NOTE — ED ADULT NURSE NOTE - DRUG PRE-SCREENING (DAST -1)
S: No complaints.     O:    Visit Vitals  /70   Pulse 90   Resp 19   Ht 5' 6\" (1.676 m)   Wt (!) 101.1 kg (222 lb 14.2 oz)   LMP 10/27/2019 (Within Days)   BMI 35.97 kg/m²     SEE ACOG FLOWSHEET    Patient Active Problem List   Diagnosis   • Obesity       A/P:  Manjinder Martinez is a 18 year old  at 16w5d for routine return OB visit. Pt denies fetal movement, vaginal bleeding, cramping, LOF. N/V has improved     Problem List reviewed:  1. Obesity  -well balanced diet and exercise    FWB:   -appropriate for gestational age; FHT=160  -SAB precautions reviewed with patient     PLAN:   -PNV daily  -f/u in 4 weeks    Silviano Coats, CNP     Statement Selected

## 2020-11-07 ENCOUNTER — EMERGENCY (EMERGENCY)
Facility: HOSPITAL | Age: 55
LOS: 1 days | Discharge: HOME | End: 2020-11-07
Attending: EMERGENCY MEDICINE | Admitting: EMERGENCY MEDICINE
Payer: MEDICARE

## 2020-11-07 VITALS
DIASTOLIC BLOOD PRESSURE: 87 MMHG | SYSTOLIC BLOOD PRESSURE: 139 MMHG | TEMPERATURE: 98 F | RESPIRATION RATE: 16 BRPM | HEART RATE: 99 BPM | OXYGEN SATURATION: 97 % | WEIGHT: 190.04 LBS | HEIGHT: 61 IN

## 2020-11-07 DIAGNOSIS — Z79.899 OTHER LONG TERM (CURRENT) DRUG THERAPY: ICD-10-CM

## 2020-11-07 DIAGNOSIS — K64.8 OTHER HEMORRHOIDS: Chronic | ICD-10-CM

## 2020-11-07 DIAGNOSIS — J45.909 UNSPECIFIED ASTHMA, UNCOMPLICATED: ICD-10-CM

## 2020-11-07 DIAGNOSIS — Z88.1 ALLERGY STATUS TO OTHER ANTIBIOTIC AGENTS STATUS: ICD-10-CM

## 2020-11-07 DIAGNOSIS — Z79.51 LONG TERM (CURRENT) USE OF INHALED STEROIDS: ICD-10-CM

## 2020-11-07 DIAGNOSIS — I10 ESSENTIAL (PRIMARY) HYPERTENSION: ICD-10-CM

## 2020-11-07 DIAGNOSIS — I88.9 NONSPECIFIC LYMPHADENITIS, UNSPECIFIED: ICD-10-CM

## 2020-11-07 DIAGNOSIS — H92.03 OTALGIA, BILATERAL: ICD-10-CM

## 2020-11-07 DIAGNOSIS — Z88.8 ALLERGY STATUS TO OTHER DRUGS, MEDICAMENTS AND BIOLOGICAL SUBSTANCES: ICD-10-CM

## 2020-11-07 LAB — GLUCOSE BLDC GLUCOMTR-MCNC: 121 MG/DL — HIGH (ref 70–99)

## 2020-11-07 PROCEDURE — 99283 EMERGENCY DEPT VISIT LOW MDM: CPT

## 2020-11-07 RX ORDER — CEPHALEXIN 500 MG
500 CAPSULE ORAL ONCE
Refills: 0 | Status: COMPLETED | OUTPATIENT
Start: 2020-11-07 | End: 2020-11-07

## 2020-11-07 RX ORDER — CEPHALEXIN 500 MG
1 CAPSULE ORAL
Qty: 20 | Refills: 0
Start: 2020-11-07 | End: 2020-11-16

## 2020-11-07 RX ADMIN — Medication 500 MILLIGRAM(S): at 18:25

## 2020-11-07 NOTE — ED ADULT TRIAGE NOTE - CHIEF COMPLAINT QUOTE
woke up with right sided right ear pain with lump. also reports blurriness to bilateral eye after being dx with infection.

## 2020-11-07 NOTE — ED PROVIDER NOTE - PATIENT PORTAL LINK FT
You can access the FollowMyHealth Patient Portal offered by [facility] by registering at the following website: [email or web address]. By joining [employer]’s FollowMyHealth portal, you will also be able to view your health information using other applications (apps) compatible with our system.

## 2020-11-07 NOTE — ED PROVIDER NOTE - NSFOLLOWUPINSTRUCTIONS_ED_ALL_ED_FT
take medications as directed     place warm soaks to affected area    return to ER if area becomes progressively painful large red or hot.     STOP SMOKING

## 2020-11-07 NOTE — ED PROVIDER NOTE - OBJECTIVE STATEMENT
Nurse Triage Note: woke up with right sided right ear pain with lump. also reports blurriness to bilateral eye after being dx with infection.  Individuals present during HPI: Lisbeth Olivas ([facility])  Vital Signs: HR 99, /87, Resp 16, Temp(F) 98.4, SpO2 97     54 y/o female with PMHx of CVA (2017 - taking daily ASA), HLD, HTN (HCTZ, Metoprolol), Vertigo, CAD, COPD, anxiety, depression, colitis, chronic neck pain, and chronic back pain as per prior chart review. Upon evaluation by MD, Patient was not present in her chair. Nurse Triage Note: woke up with right sided right ear pain with lump. also reports blurriness to bilateral eye after being dx with infection.  Individuals present during HPI: Lisbeth Olivas ([facility])  Vital Signs: HR 99, /87, Resp 16, Temp(F) 98.4, SpO2 97     56 y/o female with PMHx of CVA (2017 - taking daily ASA), HLD, HTN (HCTZ, Metoprolol), Vertigo, CAD (s/p 2 cardiac stents), COPD, anxiety, depression, colitis, chronic neck pain, and chronic back pain as per prior chart review. Patient is c/o bilateral vision changes with associated redness x 2 days. Also notes pain to right eye and drainage, which has reduced since onset. Patient is also c/o lump to right side of face near her right ear x 1 day. Of note, Patient reports getting lash extensions 2 days ago - has a history of getting lash extension in the past but denies having adverse reactions x 20 years. Denies fever, chills, FB sensation, tinnitus, headache, dizziness. Nurse Triage Note: woke up with right sided right ear pain with lump. also reports blurriness to bilateral eye after being dx with infection.  Individuals present during HPI: Lisbeth Olivas ([facility])  Vital Signs: HR 99, /87, Resp 16, Temp(F) 98.4, SpO2 97     56 y/o female with PMHx of CVA (2017 - taking daily ASA), HLD, HTN (HCTZ, Metoprolol), Vertigo, CAD (s/p 2 cardiac stents), COPD, anxiety, depression, colitis, chronic neck pain, and chronic back pain as per prior chart review. Patient is c/o bilateral vision changes with associated redness x 2 days. Also notes pain to right eye and drainage, which has reduced since onset. Patient is also c/o tender lump to right side of face anterior her right ear x 1 day. Of note, Patient reports getting lash extensions 2 days ago - has a history of getting lash extension in the past but denies having adverse reactions x 20 years. Denies fever, chills, FB sensation, tinnitus, headache, dizziness.

## 2020-11-09 DIAGNOSIS — H10.9 UNSPECIFIED CONJUNCTIVITIS: ICD-10-CM

## 2020-11-09 DIAGNOSIS — H57.89 OTHER SPECIFIED DISORDERS OF EYE AND ADNEXA: ICD-10-CM

## 2020-11-15 NOTE — ED PROVIDER NOTE - EYES NEGATIVE STATEMENT, MLM
on 11/11/20. See prenatal vital sign section and fetal assessment section    ASSESSMENT & Plan    Diagnosis Orders   1. 8 weeks gestation of pregnancy     2. Dichorionic diamniotic twin pregnancy in first trimester  US OB 1 OR MORE FETUS LIMITED       is awaiting endocrinology referral and ID appointment,      I am having Scott Salas. Bebo maintain her progesterone, Prenatal Vit-Fe Fumarate-FA (PRENATAL VITAMINS PO), and blood glucose monitor supplies. Return in about 2 weeks (around 11/25/2020) for ob sono for viability x2. There are no Patient Instructions on file for this visit.           José Luis Candelario,11/15/2020 3:14 PM no discharge, no irritation, no pain, no redness, and no visual changes.

## 2021-03-19 NOTE — ED ADULT TRIAGE NOTE - MEANS OF ARRIVAL
Caller would like to discuss an/a Return call for additional questions follow-up to test results.  Writer advised caller of callback within 24-72 hours.    Patient Name: Adriana Murray  Caller Name: Adriana  Name of Facility:   Callback Number: [phone] (M)  Best Availability: any  Can A Detailed Message Be left? yes  Fax Number:   Additional Info:   Did you confirm the message with the caller?: yes    Thank you,  Eli Haddad   ambulatory

## 2021-06-21 ENCOUNTER — EMERGENCY (EMERGENCY)
Facility: HOSPITAL | Age: 56
LOS: 1 days | Discharge: HOME | End: 2021-06-21
Admitting: EMERGENCY MEDICINE
Payer: MEDICARE

## 2021-06-21 VITALS
TEMPERATURE: 98 F | SYSTOLIC BLOOD PRESSURE: 124 MMHG | OXYGEN SATURATION: 94 % | HEART RATE: 84 BPM | DIASTOLIC BLOOD PRESSURE: 75 MMHG | RESPIRATION RATE: 18 BRPM

## 2021-06-21 VITALS
OXYGEN SATURATION: 99 % | SYSTOLIC BLOOD PRESSURE: 142 MMHG | RESPIRATION RATE: 20 BRPM | WEIGHT: 179.9 LBS | TEMPERATURE: 98 F | DIASTOLIC BLOOD PRESSURE: 86 MMHG | HEIGHT: 61 IN | HEART RATE: 115 BPM

## 2021-06-21 DIAGNOSIS — F32.9 MAJOR DEPRESSIVE DISORDER, SINGLE EPISODE, UNSPECIFIED: ICD-10-CM

## 2021-06-21 DIAGNOSIS — Z88.5 ALLERGY STATUS TO NARCOTIC AGENT: ICD-10-CM

## 2021-06-21 DIAGNOSIS — K64.8 OTHER HEMORRHOIDS: Chronic | ICD-10-CM

## 2021-06-21 DIAGNOSIS — F43.21 ADJUSTMENT DISORDER WITH DEPRESSED MOOD: ICD-10-CM

## 2021-06-21 DIAGNOSIS — J45.909 UNSPECIFIED ASTHMA, UNCOMPLICATED: ICD-10-CM

## 2021-06-21 DIAGNOSIS — I25.10 ATHEROSCLEROTIC HEART DISEASE OF NATIVE CORONARY ARTERY WITHOUT ANGINA PECTORIS: ICD-10-CM

## 2021-06-21 DIAGNOSIS — Z86.79 PERSONAL HISTORY OF OTHER DISEASES OF THE CIRCULATORY SYSTEM: ICD-10-CM

## 2021-06-21 DIAGNOSIS — R11.2 NAUSEA WITH VOMITING, UNSPECIFIED: ICD-10-CM

## 2021-06-21 DIAGNOSIS — J44.9 CHRONIC OBSTRUCTIVE PULMONARY DISEASE, UNSPECIFIED: ICD-10-CM

## 2021-06-21 DIAGNOSIS — F63.3 TRICHOTILLOMANIA: ICD-10-CM

## 2021-06-21 DIAGNOSIS — Z91.018 ALLERGY TO OTHER FOODS: ICD-10-CM

## 2021-06-21 DIAGNOSIS — Z87.19 PERSONAL HISTORY OF OTHER DISEASES OF THE DIGESTIVE SYSTEM: ICD-10-CM

## 2021-06-21 DIAGNOSIS — Z87.891 PERSONAL HISTORY OF NICOTINE DEPENDENCE: ICD-10-CM

## 2021-06-21 DIAGNOSIS — R07.9 CHEST PAIN, UNSPECIFIED: ICD-10-CM

## 2021-06-21 DIAGNOSIS — Z79.899 OTHER LONG TERM (CURRENT) DRUG THERAPY: ICD-10-CM

## 2021-06-21 DIAGNOSIS — Z86.73 PERSONAL HISTORY OF TRANSIENT ISCHEMIC ATTACK (TIA), AND CEREBRAL INFARCTION WITHOUT RESIDUAL DEFICITS: ICD-10-CM

## 2021-06-21 DIAGNOSIS — G47.33 OBSTRUCTIVE SLEEP APNEA (ADULT) (PEDIATRIC): ICD-10-CM

## 2021-06-21 DIAGNOSIS — M19.90 UNSPECIFIED OSTEOARTHRITIS, UNSPECIFIED SITE: ICD-10-CM

## 2021-06-21 DIAGNOSIS — Z88.1 ALLERGY STATUS TO OTHER ANTIBIOTIC AGENTS STATUS: ICD-10-CM

## 2021-06-21 DIAGNOSIS — Z79.82 LONG TERM (CURRENT) USE OF ASPIRIN: ICD-10-CM

## 2021-06-21 DIAGNOSIS — I10 ESSENTIAL (PRIMARY) HYPERTENSION: ICD-10-CM

## 2021-06-21 DIAGNOSIS — Z95.5 PRESENCE OF CORONARY ANGIOPLASTY IMPLANT AND GRAFT: ICD-10-CM

## 2021-06-21 DIAGNOSIS — R07.89 OTHER CHEST PAIN: ICD-10-CM

## 2021-06-21 DIAGNOSIS — E78.5 HYPERLIPIDEMIA, UNSPECIFIED: ICD-10-CM

## 2021-06-21 LAB
ALBUMIN SERPL ELPH-MCNC: 4 G/DL — SIGNIFICANT CHANGE UP (ref 3.4–5)
ALP SERPL-CCNC: 80 U/L — SIGNIFICANT CHANGE UP (ref 40–120)
ALT FLD-CCNC: 75 U/L — HIGH (ref 12–42)
ANION GAP SERPL CALC-SCNC: 14 MMOL/L — SIGNIFICANT CHANGE UP (ref 9–16)
APPEARANCE UR: CLEAR — SIGNIFICANT CHANGE UP
AST SERPL-CCNC: 48 U/L — HIGH (ref 15–37)
BASOPHILS # BLD AUTO: 0.06 K/UL — SIGNIFICANT CHANGE UP (ref 0–0.2)
BASOPHILS NFR BLD AUTO: 0.5 % — SIGNIFICANT CHANGE UP (ref 0–2)
BILIRUB SERPL-MCNC: 0.5 MG/DL — SIGNIFICANT CHANGE UP (ref 0.2–1.2)
BILIRUB UR-MCNC: ABNORMAL
BUN SERPL-MCNC: 17 MG/DL — SIGNIFICANT CHANGE UP (ref 7–23)
CALCIUM SERPL-MCNC: 9.7 MG/DL — SIGNIFICANT CHANGE UP (ref 8.5–10.5)
CHLORIDE SERPL-SCNC: 102 MMOL/L — SIGNIFICANT CHANGE UP (ref 96–108)
CO2 SERPL-SCNC: 24 MMOL/L — SIGNIFICANT CHANGE UP (ref 22–31)
COLOR SPEC: YELLOW — SIGNIFICANT CHANGE UP
CREAT SERPL-MCNC: 1.03 MG/DL — SIGNIFICANT CHANGE UP (ref 0.5–1.3)
DIFF PNL FLD: NEGATIVE — SIGNIFICANT CHANGE UP
EOSINOPHIL # BLD AUTO: 0.13 K/UL — SIGNIFICANT CHANGE UP (ref 0–0.5)
EOSINOPHIL NFR BLD AUTO: 1.1 % — SIGNIFICANT CHANGE UP (ref 0–6)
ETHANOL SERPL-MCNC: <3 MG/DL — SIGNIFICANT CHANGE UP
GLUCOSE BLDC GLUCOMTR-MCNC: 117 MG/DL — HIGH (ref 70–99)
GLUCOSE SERPL-MCNC: 115 MG/DL — HIGH (ref 70–99)
GLUCOSE UR QL: NEGATIVE — SIGNIFICANT CHANGE UP
HCG SERPL-ACNC: 9 MIU/ML — SIGNIFICANT CHANGE UP
HCG UR QL: POSITIVE — SIGNIFICANT CHANGE UP
HCT VFR BLD CALC: 49.7 % — HIGH (ref 34.5–45)
HGB BLD-MCNC: 16.7 G/DL — HIGH (ref 11.5–15.5)
IMM GRANULOCYTES NFR BLD AUTO: 0.4 % — SIGNIFICANT CHANGE UP (ref 0–1.5)
KETONES UR-MCNC: NEGATIVE — SIGNIFICANT CHANGE UP
LEUKOCYTE ESTERASE UR-ACNC: ABNORMAL
LIDOCAIN IGE QN: 151 U/L — SIGNIFICANT CHANGE UP (ref 73–393)
LYMPHOCYTES # BLD AUTO: 3.54 K/UL — HIGH (ref 1–3.3)
LYMPHOCYTES # BLD AUTO: 30.7 % — SIGNIFICANT CHANGE UP (ref 13–44)
MCHC RBC-ENTMCNC: 29.8 PG — SIGNIFICANT CHANGE UP (ref 27–34)
MCHC RBC-ENTMCNC: 33.6 GM/DL — SIGNIFICANT CHANGE UP (ref 32–36)
MCV RBC AUTO: 88.8 FL — SIGNIFICANT CHANGE UP (ref 80–100)
MONOCYTES # BLD AUTO: 0.66 K/UL — SIGNIFICANT CHANGE UP (ref 0–0.9)
MONOCYTES NFR BLD AUTO: 5.7 % — SIGNIFICANT CHANGE UP (ref 2–14)
NEUTROPHILS # BLD AUTO: 7.08 K/UL — SIGNIFICANT CHANGE UP (ref 1.8–7.4)
NEUTROPHILS NFR BLD AUTO: 61.6 % — SIGNIFICANT CHANGE UP (ref 43–77)
NITRITE UR-MCNC: NEGATIVE — SIGNIFICANT CHANGE UP
NRBC # BLD: 0 /100 WBCS — SIGNIFICANT CHANGE UP (ref 0–0)
PH UR: 6.5 — SIGNIFICANT CHANGE UP (ref 5–8)
PLATELET # BLD AUTO: 206 K/UL — SIGNIFICANT CHANGE UP (ref 150–400)
POTASSIUM SERPL-MCNC: 3.6 MMOL/L — SIGNIFICANT CHANGE UP (ref 3.5–5.3)
POTASSIUM SERPL-SCNC: 3.6 MMOL/L — SIGNIFICANT CHANGE UP (ref 3.5–5.3)
PROT SERPL-MCNC: 8.2 G/DL — SIGNIFICANT CHANGE UP (ref 6.4–8.2)
PROT UR-MCNC: ABNORMAL MG/DL
RBC # BLD: 5.6 M/UL — HIGH (ref 3.8–5.2)
RBC # FLD: 13.9 % — SIGNIFICANT CHANGE UP (ref 10.3–14.5)
SODIUM SERPL-SCNC: 140 MMOL/L — SIGNIFICANT CHANGE UP (ref 132–145)
SP GR SPEC: 1.01 — SIGNIFICANT CHANGE UP (ref 1–1.03)
TROPONIN I SERPL-MCNC: <0.017 NG/ML — LOW (ref 0.02–0.06)
UROBILINOGEN FLD QL: 1 E.U./DL — SIGNIFICANT CHANGE UP
WBC # BLD: 11.52 K/UL — HIGH (ref 3.8–10.5)
WBC # FLD AUTO: 11.52 K/UL — HIGH (ref 3.8–10.5)

## 2021-06-21 PROCEDURE — 93010 ELECTROCARDIOGRAM REPORT: CPT

## 2021-06-21 PROCEDURE — 76705 ECHO EXAM OF ABDOMEN: CPT | Mod: 26

## 2021-06-21 PROCEDURE — 99285 EMERGENCY DEPT VISIT HI MDM: CPT

## 2021-06-21 RX ORDER — KETOROLAC TROMETHAMINE 30 MG/ML
15 SYRINGE (ML) INJECTION ONCE
Refills: 0 | Status: DISCONTINUED | OUTPATIENT
Start: 2021-06-21 | End: 2021-06-21

## 2021-06-21 RX ORDER — QUETIAPINE FUMARATE 200 MG/1
200 TABLET, FILM COATED ORAL ONCE
Refills: 0 | Status: COMPLETED | OUTPATIENT
Start: 2021-06-21 | End: 2021-06-21

## 2021-06-21 RX ORDER — QUETIAPINE FUMARATE 200 MG/1
1 TABLET, FILM COATED ORAL
Qty: 30 | Refills: 0
Start: 2021-06-21 | End: 2021-07-20

## 2021-06-21 RX ORDER — ONDANSETRON 8 MG/1
1 TABLET, FILM COATED ORAL
Qty: 10 | Refills: 0
Start: 2021-06-21

## 2021-06-21 RX ORDER — SODIUM CHLORIDE 9 MG/ML
1000 INJECTION INTRAMUSCULAR; INTRAVENOUS; SUBCUTANEOUS ONCE
Refills: 0 | Status: COMPLETED | OUTPATIENT
Start: 2021-06-21 | End: 2021-06-21

## 2021-06-21 RX ORDER — FAMOTIDINE 10 MG/ML
20 INJECTION INTRAVENOUS ONCE
Refills: 0 | Status: COMPLETED | OUTPATIENT
Start: 2021-06-21 | End: 2021-06-21

## 2021-06-21 RX ORDER — METOCLOPRAMIDE HCL 10 MG
10 TABLET ORAL ONCE
Refills: 0 | Status: COMPLETED | OUTPATIENT
Start: 2021-06-21 | End: 2021-06-21

## 2021-06-21 RX ORDER — ONDANSETRON 8 MG/1
4 TABLET, FILM COATED ORAL ONCE
Refills: 0 | Status: COMPLETED | OUTPATIENT
Start: 2021-06-21 | End: 2021-06-21

## 2021-06-21 RX ADMIN — Medication 10 MILLIGRAM(S): at 20:26

## 2021-06-21 RX ADMIN — SODIUM CHLORIDE 1000 MILLILITER(S): 9 INJECTION INTRAMUSCULAR; INTRAVENOUS; SUBCUTANEOUS at 22:07

## 2021-06-21 RX ADMIN — SODIUM CHLORIDE 1000 MILLILITER(S): 9 INJECTION INTRAMUSCULAR; INTRAVENOUS; SUBCUTANEOUS at 20:28

## 2021-06-21 RX ADMIN — QUETIAPINE FUMARATE 200 MILLIGRAM(S): 200 TABLET, FILM COATED ORAL at 20:26

## 2021-06-21 RX ADMIN — FAMOTIDINE 20 MILLIGRAM(S): 10 INJECTION INTRAVENOUS at 20:26

## 2021-06-21 RX ADMIN — Medication 15 MILLIGRAM(S): at 22:07

## 2021-06-21 RX ADMIN — Medication 15 MILLIGRAM(S): at 20:27

## 2021-06-21 NOTE — ED PROVIDER NOTE - OBJECTIVE STATEMENT
57 yo F with PMHx of CAD s/p PCI with one stent, on baby ASA, HTN, COPD/asthma, no h/o intubation, sleep apnea with nocturnal CPAP, anxiety, depression (on seroquel 50mg qAM, 400mg qPM, off meds x 2 wks), arthritis, chronic pain, presenting c/o CP, epigastric pain, N/V, insomnia, and decreased po intake x 1 wk.  Pt reports recent loss of her daughter since end of March.  Since then has been feeling more depressed with decreased po intake and insomnia.  Has been off psych meds x 2 wks, didn't notice she ran out of meds and didn't follow up with anyone outpt.  Now with multiple episodes of NBNB emesis and fatigue.  Denies SI/HI/AH/VH/delusion, HA, dizziness, CP, SOB, palpitations, D/C, abdominal pain, change in urinary/bowel function, tremors, focal weakness, and fever/chills.

## 2021-06-21 NOTE — ED PROVIDER NOTE - CLINICAL SUMMARY MEDICAL DECISION MAKING FREE TEXT BOX
pt p/w CP/epigastric pain with n/v and decreased po intake with anxiety and insomnia.  Noted recent loss of her daughter and has been having increased mood swings and labile emotions.  However, no SI/HI/AH/VH/delusion, psychiatrically stable with counseling provided at bedside, given home dose seroquel, able to sleep and with sound judgement and insights, going through normal phases of grieving with +social support from her son and other family members at home.  labs with mildly elevated LFTs, T bili wnl, US with sludge but no apparent stones or acute ricki noted, CBD upper limit of norm, ?CBD stone induced epigastric pain with N/V, however, pain adequately controlled in the ED and tolerating po without N/V, HEART score of 2, trop neg, EKG with no acute ischemic findings, AFVSS at time of d/c, pt non-toxic appearing, results, ddx, and f/u plans discussed with pt at bedside, d/c'd home to f/u with PMD and GI and psych, strict return precautions discussed, pt feels safe going home, prompt return to ER for any worsening or new sx, pt verbalized understanding. pt p/w CP/epigastric pain with n/v and decreased po intake with anxiety and insomnia.  Noted recent loss of her daughter and has been having increased mood swings and labile emotions.  However, no SI/HI/AH/VH/delusion, psychiatrically stable with counseling provided at bedside, given home dose seroquel, able to sleep and with sound judgement and insights, going through normal phases of grieving with +social support from her son and other family members at home.  labs with mildly elevated LFTs, T bili wnl, US with sludge but no apparent stones or acute ricki noted, CBD upper limit of norm, ?CBD stone induced epigastric pain with N/V, however, pain adequately controlled in the ED and tolerating po without N/V, HEART score of 2, trop neg, EKG with no acute ischemic findings, also noted incidental elevation of urine HCG (faint positive line per ), serum HCG of 9, pt is 11 yrs post menopausal and currently not sexually active, ?tumor marker elevation or occult GYN issues, encouraged prompt f/u with GYN for outpt w/u, AFVSS at time of d/c, pt non-toxic appearing, results, ddx, and f/u plans discussed with pt at bedside, d/c'd home to f/u with PMD and GI and psych, strict return precautions discussed, pt feels safe going home, prompt return to ER for any worsening or new sx, pt verbalized understanding.

## 2021-06-21 NOTE — ED PROVIDER NOTE - CARE PROVIDER_API CALL
Michael Pollock)  Gastroenterology; Internal Medicine  [address] [place] [address]  Phone: [phone]  Fax: [phone]  Follow Up Time:     Migdalia Mchugh)  Psychiatry  [address] [address]  Phone: [phone]  Fax: [phone]  Follow Up Time:     Dilling, Marylee H (MD)  Internal Medicine; Pediatrics  [address] [place] [address]  Phone: [phone]  Fax: [phone]  Follow Up Time:    Michael Pollock)  Gastroenterology; Internal Medicine  [address] [place] [address]  Phone: [phone]  Fax: [phone]  Follow Up Time:     Migdalia Mchugh)  Psychiatry  [address] [address]  Phone: [phone]  Fax: [phone]  Follow Up Time:     Dilling, Marylee H (MD)  Internal Medicine; Pediatrics  [address] [place] [address]  Phone: [phone]  Fax: [phone]  Follow Up Time:     Isis Swenson)  Female Pelvic MedReconst Surg; Obstetrics and Gynecology  [address] [place] [address]  Phone: [phone]  Fax: [phone]  Follow Up Time:     Daniel Caal  OBSTETRICS AND GYNECOLOGY  [address] [facility], [address]  [place] [address]  Phone: [phone]  Fax: [phone]  Follow Up Time:

## 2021-06-21 NOTE — ED ADULT NURSE NOTE - PAIN: PRESENCE, MLM
Prior Authorization Approval    Authorization Effective Date: 9/24/2020  Authorization Expiration Date: 1/2/2021  Medication: Nucala 100MG/ML auto-injectors (APPROVED)  Approved Dose/Quantity: 1/28 days  Reference #:     Insurance Company: [employer] - Phone [phone] Fax [phone]  Expected CoPay:       CoPay Card Available:      Foundation Assistance Needed:    Which Pharmacy is filling the prescription (Not needed for infusion/clinic administered): [facility] - [place] [address]  Pharmacy Notified: Yes  Patient Notified: Yes      PA on file effective until 01/05/2021  
Prior Authorization Retail Medication Request    Medication/Dose: Nucala 100MG/ML  ICD code (if different than what is on RX):    Previously Tried and Failed:    Rationale:      Insurance Name:    Insurance ID:        Pharmacy Information (if different than what is on RX)  [facility]    
chest/complains of pain/discomfort

## 2021-06-21 NOTE — ED PROVIDER NOTE - PATIENT PORTAL LINK FT
You can access the FollowMyHealth Patient Portal offered by [facility] by registering at the following website: [email or web address]. By joining [employer]’s FollowMyHealth portal, you will also be able to view your health information using other applications (apps) compatible with our system.

## 2021-06-21 NOTE — ED PROVIDER NOTE - PROVIDER TOKENS
PROVIDER:[TOKEN:[[id number]]],PROVIDER:[TOKEN:[[id number]]],PROVIDER:[TOKEN:[[id number]]] PROVIDER:[TOKEN:[[id number]]],PROVIDER:[TOKEN:[[id number]]],PROVIDER:[TOKEN:[[id number]]],PROVIDER:[TOKEN:[[id number]]],PROVIDER:[TOKEN:[[id number]]]

## 2021-06-21 NOTE — ED PROVIDER NOTE - CARE PROVIDERS DIRECT ADDRESSES
,[email or web address],DirectAddress_Unknown,[email or web address] ,[email or web address],DirectAddress_Unknown,DirectAddress_Unknown,[email or web address],DirectAddress_Unknown

## 2021-06-21 NOTE — ED ADULT TRIAGE NOTE - CHIEF COMPLAINT QUOTE
chest pain/dizziness x 1 week accompanied by nausea/voniting/insomnia endorsed loss of daughter in march, poor po intake endorsed noncompliance with meds

## 2021-06-21 NOTE — ED ADULT NURSE REASSESSMENT NOTE - NS ED NURSE REASSESS COMMENT FT1
Rcvd pt from JONATHNA Nichols. Pt resting in stretcher, labs sent by previous shift. Medicated as ordered. 20g in left AC, no redness, swelling, or signs of inflammation noted. Pt started on IV fluids and meds, complained of burning at site. Offered to place new IV, pt refuses and recants that IV site is burning. Pending ultrasound.

## 2021-06-21 NOTE — ED PROVIDER NOTE - NPI NUMBER (FOR SYSADMIN USE ONLY) :
[[id number]] [[id number]],[[id number]]

## 2021-06-21 NOTE — ED PROVIDER NOTE - NSFOLLOWUPINSTRUCTIONS_ED_ALL_ED_FT
Biliary/Common bile duct Colic    WHAT YOU NEED TO KNOW:    Biliary colic is severe pain in your upper abdomen caused by a gallbladder problem or your common bile duct. Your gallbladder stores bile, which helps break down the fats that you eat.     Gallbladder    DISCHARGE INSTRUCTIONS:    Medicines:   •Medicines can help decrease pain and muscle spasms. You may also need medicine to calm your stomach and stop vomiting.      •Take your medicine as directed. Contact your healthcare provider if you think your medicine is not helping or you have side effects. Tell him if you are allergic to any medicine. Keep a list of the medicines, vitamins, and herbs you take. Include the amounts, and when and why you take them. Bring the list or the pill bottles to follow-up visits. Carry your medicine list with you in case of an emergency.      Avoid alcohol: Alcohol can damage your gallbladder and make your symptoms worse.    Maintain a healthy weight: Ask your healthcare provider how much you should weigh. Ask him to help you create a weight loss plan if you are overweight.     Eat a variety of healthy foods: Healthy foods include fruits, vegetables, whole-grain breads, low-fat dairy products, beans, lean meats, and fish. Foods that are high in fiber and low in fat and cholesterol may decrease your symptoms. Ask if you need to be on a special diet.    Exercise: Ask your healthcare provider about the best exercise plan for you. Exercise may help improve your symptoms.    Follow up with your healthcare provider as directed: Bring a list of any questions you have so you remember to ask them during your visits.     Contact your healthcare provider if:   •You have a fever.      •Your pain gets worse, even after you take medicine.      •You have nausea or are vomiting.      •Your skin or eyes are yellow.      •You have questions or concerns about your condition or care.      Return to the emergency department if:   •You have severe pain.      •You feel like you are going to faint.      •You are short of breath.    Chest Pain    Chest pain can be caused by many different conditions which may or may not be dangerous. Causes include heartburn, lung infections, heart attack, blood clot in lungs, skin infections, strain or damage to muscle, cartilage, or bones, etc. In addition to a history and physical examination, an electrocardiogram (ECG) or other lab tests may have been performed to determine the cause of your chest pain. Follow up with your primary care provider or with a cardiologist as instructed.     SEEK IMMEDIATE MEDICAL CARE IF YOU HAVE ANY OF THE FOLLOWING SYMPTOMS: worsening chest pain, coughing up blood, unexplained back/neck/jaw pain, severe abdominal pain, dizziness or lightheadedness, fainting, shortness of breath, sweaty or clammy skin, vomiting, or racing heart beat. These symptoms may represent a serious problem that is an emergency. Do not wait to see if the symptoms will go away. Get medical help right away. Call 911 and do not drive yourself to the hospital.     Follow up with your primary care doctor or clinics listed below if you do not have a doctor,    [address] [address]  To make an appointment, call [phone]    [facility]  Address: [address] [place] [address]  Appointment Center: [phone] ([phone])     [address] [place] [address] NET is a good referral line for crisis and substance abuse help.   has drop in programs all over the [facility].    Return to the ER for Emergencies.  Return immediately for any new or worsening symptoms or any new concerns

## 2021-06-21 NOTE — ED PROVIDER NOTE - PHYSICAL EXAMINATION
Vital Signs - nursing notes reviewed and confirmed  Gen - WDWN F, labile emotions, comfortable and non-toxic appearing, speaking in full sentences   Skin - warm, dry, intact  HEENT - AT/NC, PERRL, EOMI, no conjunctival injection, moist oral mucosa, TM intact b/l with good cone of lights, o/p clear with no erythema, edema, or exudate, uvula midline, airway patent, neck supple and NT, FROM, no JVD or carotid bruits b/l, no palpable nodes  CV - S1S2, R/R/R  Resp - respiration non-labored, CTAB, symmetric bs b/l, no r/r/w  GI - NABS, soft, ND, epigastric region and RUQ TTP with no rebound or guarding, no CVAT b/l    MS - w/w/p, no c/c/e, calves supple and NT, distal pulses symmetric b/l, brisk cap refills, +SILT  Psych - dysphoric, +anxious and labile emotions, normal speech and eye contact, judgement and insight intact, no SI/HI/AH/VH/delusion   Neuro - AxOx3, no focal neuro deficits, CN II-XII grossly intact, ambulatory without gait disturbance

## 2021-06-21 NOTE — ED PROVIDER NOTE - CARE PLAN
Principal Discharge DX:	Atypical chest pain  Secondary Diagnosis:	Grief reaction with prolonged bereavement

## 2021-06-22 RX ADMIN — ONDANSETRON 4 MILLIGRAM(S): 8 TABLET, FILM COATED ORAL at 00:03

## 2021-06-22 RX ADMIN — QUETIAPINE FUMARATE 200 MILLIGRAM(S): 200 TABLET, FILM COATED ORAL at 00:03

## 2021-07-21 ENCOUNTER — APPOINTMENT (OUTPATIENT)
Dept: GASTROENTEROLOGY | Facility: CLINIC | Age: 56
End: 2021-07-21

## 2021-08-23 ENCOUNTER — EMERGENCY (EMERGENCY)
Facility: HOSPITAL | Age: 56
LOS: 1 days | Discharge: HOME | End: 2021-08-23
Attending: EMERGENCY MEDICINE | Admitting: EMERGENCY MEDICINE
Payer: MEDICARE

## 2021-08-23 VITALS
DIASTOLIC BLOOD PRESSURE: 75 MMHG | OXYGEN SATURATION: 97 % | TEMPERATURE: 99 F | SYSTOLIC BLOOD PRESSURE: 154 MMHG | HEART RATE: 113 BPM | RESPIRATION RATE: 18 BRPM

## 2021-08-23 VITALS
TEMPERATURE: 99 F | HEIGHT: 61 IN | WEIGHT: 184.09 LBS | HEART RATE: 103 BPM | DIASTOLIC BLOOD PRESSURE: 93 MMHG | OXYGEN SATURATION: 97 % | RESPIRATION RATE: 16 BRPM | SYSTOLIC BLOOD PRESSURE: 155 MMHG

## 2021-08-23 DIAGNOSIS — Z91.5 PERSONAL HISTORY OF SELF-HARM: ICD-10-CM

## 2021-08-23 DIAGNOSIS — K64.8 OTHER HEMORRHOIDS: Chronic | ICD-10-CM

## 2021-08-23 DIAGNOSIS — Z91.018 ALLERGY TO OTHER FOODS: ICD-10-CM

## 2021-08-23 DIAGNOSIS — Z88.1 ALLERGY STATUS TO OTHER ANTIBIOTIC AGENTS STATUS: ICD-10-CM

## 2021-08-23 DIAGNOSIS — Z79.82 LONG TERM (CURRENT) USE OF ASPIRIN: ICD-10-CM

## 2021-08-23 DIAGNOSIS — R06.02 SHORTNESS OF BREATH: ICD-10-CM

## 2021-08-23 DIAGNOSIS — I25.10 ATHEROSCLEROTIC HEART DISEASE OF NATIVE CORONARY ARTERY WITHOUT ANGINA PECTORIS: ICD-10-CM

## 2021-08-23 DIAGNOSIS — G47.33 OBSTRUCTIVE SLEEP APNEA (ADULT) (PEDIATRIC): ICD-10-CM

## 2021-08-23 DIAGNOSIS — Z86.73 PERSONAL HISTORY OF TRANSIENT ISCHEMIC ATTACK (TIA), AND CEREBRAL INFARCTION WITHOUT RESIDUAL DEFICITS: ICD-10-CM

## 2021-08-23 DIAGNOSIS — Z79.899 OTHER LONG TERM (CURRENT) DRUG THERAPY: ICD-10-CM

## 2021-08-23 DIAGNOSIS — I10 ESSENTIAL (PRIMARY) HYPERTENSION: ICD-10-CM

## 2021-08-23 DIAGNOSIS — F32.9 MAJOR DEPRESSIVE DISORDER, SINGLE EPISODE, UNSPECIFIED: ICD-10-CM

## 2021-08-23 DIAGNOSIS — Z20.822 CONTACT WITH AND (SUSPECTED) EXPOSURE TO COVID-19: ICD-10-CM

## 2021-08-23 DIAGNOSIS — F17.200 NICOTINE DEPENDENCE, UNSPECIFIED, UNCOMPLICATED: ICD-10-CM

## 2021-08-23 DIAGNOSIS — J44.1 CHRONIC OBSTRUCTIVE PULMONARY DISEASE WITH (ACUTE) EXACERBATION: ICD-10-CM

## 2021-08-23 DIAGNOSIS — E78.5 HYPERLIPIDEMIA, UNSPECIFIED: ICD-10-CM

## 2021-08-23 LAB
ALBUMIN SERPL ELPH-MCNC: 3.9 G/DL — SIGNIFICANT CHANGE UP (ref 3.4–5)
ALP SERPL-CCNC: 76 U/L — SIGNIFICANT CHANGE UP (ref 40–120)
ALT FLD-CCNC: 48 U/L — HIGH (ref 12–42)
ANION GAP SERPL CALC-SCNC: 9 MMOL/L — SIGNIFICANT CHANGE UP (ref 9–16)
AST SERPL-CCNC: 30 U/L — SIGNIFICANT CHANGE UP (ref 15–37)
BASOPHILS # BLD AUTO: 0.04 K/UL — SIGNIFICANT CHANGE UP (ref 0–0.2)
BASOPHILS NFR BLD AUTO: 0.5 % — SIGNIFICANT CHANGE UP (ref 0–2)
BILIRUB SERPL-MCNC: 0.3 MG/DL — SIGNIFICANT CHANGE UP (ref 0.2–1.2)
BUN SERPL-MCNC: 10 MG/DL — SIGNIFICANT CHANGE UP (ref 7–23)
CALCIUM SERPL-MCNC: 8.9 MG/DL — SIGNIFICANT CHANGE UP (ref 8.5–10.5)
CHLORIDE SERPL-SCNC: 106 MMOL/L — SIGNIFICANT CHANGE UP (ref 96–108)
CK SERPL-CCNC: 55 U/L — SIGNIFICANT CHANGE UP (ref 26–192)
CO2 SERPL-SCNC: 28 MMOL/L — SIGNIFICANT CHANGE UP (ref 22–31)
CREAT SERPL-MCNC: 0.72 MG/DL — SIGNIFICANT CHANGE UP (ref 0.5–1.3)
CRP SERPL-MCNC: 14 MG/L — HIGH (ref 0–9)
D DIMER BLD IA.RAPID-MCNC: <187 NG/ML DDU — SIGNIFICANT CHANGE UP
EOSINOPHIL # BLD AUTO: 0.23 K/UL — SIGNIFICANT CHANGE UP (ref 0–0.5)
EOSINOPHIL NFR BLD AUTO: 2.9 % — SIGNIFICANT CHANGE UP (ref 0–6)
FLUAV H1 2009 PAND RNA SPEC QL NAA+PROBE: SIGNIFICANT CHANGE UP
FLUAV H1 RNA SPEC QL NAA+PROBE: SIGNIFICANT CHANGE UP
FLUAV H3 RNA SPEC QL NAA+PROBE: SIGNIFICANT CHANGE UP
FLUAV SUBTYP SPEC NAA+PROBE: SIGNIFICANT CHANGE UP
FLUBV RNA SPEC QL NAA+PROBE: SIGNIFICANT CHANGE UP
GLUCOSE SERPL-MCNC: 105 MG/DL — HIGH (ref 70–99)
HCT VFR BLD CALC: 42.7 % — SIGNIFICANT CHANGE UP (ref 34.5–45)
HGB BLD-MCNC: 14.3 G/DL — SIGNIFICANT CHANGE UP (ref 11.5–15.5)
IMM GRANULOCYTES NFR BLD AUTO: 0.4 % — SIGNIFICANT CHANGE UP (ref 0–1.5)
LACTATE SERPL-SCNC: 2.1 MMOL/L — HIGH (ref 0.4–2)
LYMPHOCYTES # BLD AUTO: 2.19 K/UL — SIGNIFICANT CHANGE UP (ref 1–3.3)
LYMPHOCYTES # BLD AUTO: 27.8 % — SIGNIFICANT CHANGE UP (ref 13–44)
MCHC RBC-ENTMCNC: 29.9 PG — SIGNIFICANT CHANGE UP (ref 27–34)
MCHC RBC-ENTMCNC: 33.5 GM/DL — SIGNIFICANT CHANGE UP (ref 32–36)
MCV RBC AUTO: 89.1 FL — SIGNIFICANT CHANGE UP (ref 80–100)
MONOCYTES # BLD AUTO: 0.59 K/UL — SIGNIFICANT CHANGE UP (ref 0–0.9)
MONOCYTES NFR BLD AUTO: 7.5 % — SIGNIFICANT CHANGE UP (ref 2–14)
NEUTROPHILS # BLD AUTO: 4.81 K/UL — SIGNIFICANT CHANGE UP (ref 1.8–7.4)
NEUTROPHILS NFR BLD AUTO: 60.9 % — SIGNIFICANT CHANGE UP (ref 43–77)
NRBC # BLD: 0 /100 WBCS — SIGNIFICANT CHANGE UP (ref 0–0)
NT-PROBNP SERPL-SCNC: 168 PG/ML — SIGNIFICANT CHANGE UP
PCO2 BLDV: 46 MMHG — SIGNIFICANT CHANGE UP (ref 41–51)
PH BLDV: 7.38 — SIGNIFICANT CHANGE UP (ref 7.32–7.43)
PLATELET # BLD AUTO: 174 K/UL — SIGNIFICANT CHANGE UP (ref 150–400)
PO2 BLDV: 39 MMHG — SIGNIFICANT CHANGE UP (ref 35–40)
POTASSIUM SERPL-MCNC: 3.6 MMOL/L — SIGNIFICANT CHANGE UP (ref 3.5–5.3)
POTASSIUM SERPL-SCNC: 3.6 MMOL/L — SIGNIFICANT CHANGE UP (ref 3.5–5.3)
PROT SERPL-MCNC: 7.7 G/DL — SIGNIFICANT CHANGE UP (ref 6.4–8.2)
RAPID RVP RESULT: DETECTED
RBC # BLD: 4.79 M/UL — SIGNIFICANT CHANGE UP (ref 3.8–5.2)
RBC # FLD: 14.2 % — SIGNIFICANT CHANGE UP (ref 10.3–14.5)
RV+EV RNA SPEC QL NAA+PROBE: DETECTED
S PYO AG SPEC QL IA: NEGATIVE — SIGNIFICANT CHANGE UP
SAO2 % BLDV: 75 % — SIGNIFICANT CHANGE UP
SARS-COV-2 RNA SPEC QL NAA+PROBE: SIGNIFICANT CHANGE UP
SODIUM SERPL-SCNC: 143 MMOL/L — SIGNIFICANT CHANGE UP (ref 132–145)
TROPONIN I SERPL-MCNC: <0.017 NG/ML — LOW (ref 0.02–0.06)
WBC # BLD: 7.89 K/UL — SIGNIFICANT CHANGE UP (ref 3.8–10.5)
WBC # FLD AUTO: 7.89 K/UL — SIGNIFICANT CHANGE UP (ref 3.8–10.5)

## 2021-08-23 PROCEDURE — 71045 X-RAY EXAM CHEST 1 VIEW: CPT | Mod: 26

## 2021-08-23 PROCEDURE — 99220: CPT | Mod: 25

## 2021-08-23 RX ORDER — ALBUTEROL 90 UG/1
2.5 AEROSOL, METERED ORAL
Refills: 0 | Status: COMPLETED | OUTPATIENT
Start: 2021-08-23 | End: 2021-08-23

## 2021-08-23 RX ORDER — SODIUM CHLORIDE 9 MG/ML
1000 INJECTION INTRAMUSCULAR; INTRAVENOUS; SUBCUTANEOUS ONCE
Refills: 0 | Status: COMPLETED | OUTPATIENT
Start: 2021-08-23 | End: 2021-08-23

## 2021-08-23 RX ORDER — CEFTRIAXONE 500 MG/1
1000 INJECTION, POWDER, FOR SOLUTION INTRAMUSCULAR; INTRAVENOUS ONCE
Refills: 0 | Status: COMPLETED | OUTPATIENT
Start: 2021-08-23 | End: 2021-08-23

## 2021-08-23 RX ORDER — IPRATROPIUM BROMIDE 0.2 MG/ML
500 SOLUTION, NON-ORAL INHALATION ONCE
Refills: 0 | Status: COMPLETED | OUTPATIENT
Start: 2021-08-23 | End: 2021-08-23

## 2021-08-23 RX ORDER — CIPROFLOXACIN HCL 0.3 %
1 DROPS OPHTHALMIC (EYE) ONCE
Refills: 0 | Status: COMPLETED | OUTPATIENT
Start: 2021-08-23 | End: 2021-08-23

## 2021-08-23 RX ORDER — AZITHROMYCIN 500 MG/1
500 TABLET, FILM COATED ORAL ONCE
Refills: 0 | Status: COMPLETED | OUTPATIENT
Start: 2021-08-23 | End: 2021-08-23

## 2021-08-23 RX ORDER — ALBUTEROL 90 UG/1
2 AEROSOL, METERED ORAL
Qty: 1 | Refills: 0
Start: 2021-08-23 | End: 2021-08-25

## 2021-08-23 RX ADMIN — ALBUTEROL 2.5 MILLIGRAM(S): 90 AEROSOL, METERED ORAL at 09:45

## 2021-08-23 RX ADMIN — ALBUTEROL 2.5 MILLIGRAM(S): 90 AEROSOL, METERED ORAL at 09:15

## 2021-08-23 RX ADMIN — SODIUM CHLORIDE 1000 MILLILITER(S): 9 INJECTION INTRAMUSCULAR; INTRAVENOUS; SUBCUTANEOUS at 09:15

## 2021-08-23 RX ADMIN — AZITHROMYCIN 255 MILLIGRAM(S): 500 TABLET, FILM COATED ORAL at 10:00

## 2021-08-23 RX ADMIN — Medication 500 MICROGRAM(S): at 09:15

## 2021-08-23 RX ADMIN — ALBUTEROL 2.5 MILLIGRAM(S): 90 AEROSOL, METERED ORAL at 09:30

## 2021-08-23 RX ADMIN — CEFTRIAXONE 100 MILLIGRAM(S): 500 INJECTION, POWDER, FOR SOLUTION INTRAMUSCULAR; INTRAVENOUS at 09:25

## 2021-08-23 RX ADMIN — Medication 1 DROP(S): at 09:41

## 2021-08-23 RX ADMIN — Medication 100 MILLIGRAM(S): at 09:31

## 2021-08-23 RX ADMIN — SODIUM CHLORIDE 1000 MILLILITER(S): 9 INJECTION INTRAMUSCULAR; INTRAVENOUS; SUBCUTANEOUS at 10:19

## 2021-08-23 RX ADMIN — Medication 125 MILLIGRAM(S): at 09:25

## 2021-08-23 NOTE — ED PROVIDER NOTE - NSICDXPASTMEDICALHX_GEN_ALL_CORE_FT
PAST MEDICAL HISTORY:  Anxiety     Arthritis     Asthma     CAD (coronary artery disease)     Cerebrovascular accident (CVA), unspecified mechanism     Chronic pain     Colitis     COPD (chronic obstructive pulmonary disease)     Depression     Heart disease     HLD (hyperlipidemia)     HTN (hypertension)     Myocardial infarction, unspecified MI type, unspecified artery     NUZHAT (obstructive sleep apnea)     Suicide attempt     Tobacco abuse     Trichotillomania in adult     Vertigo

## 2021-08-23 NOTE — ED CDU PROVIDER INITIAL DAY NOTE - CPE EDP SKIN NORM
Patient was advised of below results and recommendations per Dr. Omkar Foy    Patient understood results without questions.    - - -

## 2021-08-23 NOTE — ED PROVIDER NOTE - CLINICAL SUMMARY MEDICAL DECISION MAKING FREE TEXT BOX
CC:  cough, SOB, wheeze, hx of COPD, currently smokes, has been intubated 4x, white phlegm  will order albuterol/ipratrop, solu-medrol, RVP with Covid and place in CDU for diagnostic uncertainty and therapeutic intensity

## 2021-08-23 NOTE — ED CDU PROVIDER DISPOSITION NOTE - NSFOLLOWUPINSTRUCTIONS_ED_ALL_ED_FT
Please consider quitting smoking.  Please take prednisone and albuterol as prescribed.  Please see your doctor within 3 days. Please consider quitting smoking.  Please take prednisone and albuterol as prescribed.  Please see your doctor within 3 days.    Your nasal swab showed rhinovirus.  Please be careful and wear a mask.

## 2021-08-23 NOTE — ED CDU PROVIDER DISPOSITION NOTE - PATIENT PORTAL LINK FT
You can access the FollowMyHealth Patient Portal offered by [facility] by registering at the following website: [email or web address]. By joining [employer]’s FollowMyHealth portal, you will also be able to view your health information using other applications (apps) compatible with our system.

## 2021-08-23 NOTE — ED ADULT NURSE NOTE - OBJECTIVE STATEMENT
Pt is a 56y female complaining of sob. PT with cough and wheezing. Pt with COPD. Pt is a 56y female complaining of sob. PT with cough and wheezing. Pt with COPD. Pt states that she has been intubated in the past. Pt fully vaccinated against COVID 19. Pt denies fever and chills.

## 2021-08-23 NOTE — ED CDU PROVIDER INITIAL DAY NOTE - MEDICAL DECISION MAKING DETAILS
albuterol, ipratroprium, Tessalon, solu-medrol, RVP with Covid19 panel  hx of intubation 4x, COPD currently  smokes

## 2021-08-25 LAB
CULTURE RESULTS: SIGNIFICANT CHANGE UP
SPECIMEN SOURCE: SIGNIFICANT CHANGE UP

## 2021-08-28 LAB
CULTURE RESULTS: SIGNIFICANT CHANGE UP
CULTURE RESULTS: SIGNIFICANT CHANGE UP
SPECIMEN SOURCE: SIGNIFICANT CHANGE UP
SPECIMEN SOURCE: SIGNIFICANT CHANGE UP

## 2021-10-26 ENCOUNTER — EMERGENCY (EMERGENCY)
Facility: HOSPITAL | Age: 56
LOS: 1 days | Discharge: HOME | End: 2021-10-26
Attending: EMERGENCY MEDICINE | Admitting: EMERGENCY MEDICINE
Payer: COMMERCIAL

## 2021-10-26 VITALS
HEART RATE: 91 BPM | TEMPERATURE: 98 F | OXYGEN SATURATION: 95 % | RESPIRATION RATE: 17 BRPM | DIASTOLIC BLOOD PRESSURE: 83 MMHG | SYSTOLIC BLOOD PRESSURE: 132 MMHG

## 2021-10-26 VITALS
HEIGHT: 61 IN | RESPIRATION RATE: 18 BRPM | TEMPERATURE: 98 F | DIASTOLIC BLOOD PRESSURE: 94 MMHG | WEIGHT: 179.9 LBS | OXYGEN SATURATION: 95 % | HEART RATE: 102 BPM | SYSTOLIC BLOOD PRESSURE: 160 MMHG

## 2021-10-26 DIAGNOSIS — Z91.018 ALLERGY TO OTHER FOODS: ICD-10-CM

## 2021-10-26 DIAGNOSIS — F41.9 ANXIETY DISORDER, UNSPECIFIED: ICD-10-CM

## 2021-10-26 DIAGNOSIS — Z88.5 ALLERGY STATUS TO NARCOTIC AGENT: ICD-10-CM

## 2021-10-26 DIAGNOSIS — M25.571 PAIN IN RIGHT ANKLE AND JOINTS OF RIGHT FOOT: ICD-10-CM

## 2021-10-26 DIAGNOSIS — J44.1 CHRONIC OBSTRUCTIVE PULMONARY DISEASE WITH (ACUTE) EXACERBATION: ICD-10-CM

## 2021-10-26 DIAGNOSIS — Z79.82 LONG TERM (CURRENT) USE OF ASPIRIN: ICD-10-CM

## 2021-10-26 DIAGNOSIS — Z20.822 CONTACT WITH AND (SUSPECTED) EXPOSURE TO COVID-19: ICD-10-CM

## 2021-10-26 DIAGNOSIS — K64.8 OTHER HEMORRHOIDS: Chronic | ICD-10-CM

## 2021-10-26 DIAGNOSIS — I10 ESSENTIAL (PRIMARY) HYPERTENSION: ICD-10-CM

## 2021-10-26 DIAGNOSIS — I25.10 ATHEROSCLEROTIC HEART DISEASE OF NATIVE CORONARY ARTERY WITHOUT ANGINA PECTORIS: ICD-10-CM

## 2021-10-26 DIAGNOSIS — F32.9 MAJOR DEPRESSIVE DISORDER, SINGLE EPISODE, UNSPECIFIED: ICD-10-CM

## 2021-10-26 DIAGNOSIS — M19.90 UNSPECIFIED OSTEOARTHRITIS, UNSPECIFIED SITE: ICD-10-CM

## 2021-10-26 DIAGNOSIS — J45.909 UNSPECIFIED ASTHMA, UNCOMPLICATED: ICD-10-CM

## 2021-10-26 DIAGNOSIS — F17.210 NICOTINE DEPENDENCE, CIGARETTES, UNCOMPLICATED: ICD-10-CM

## 2021-10-26 DIAGNOSIS — R05.1 ACUTE COUGH: ICD-10-CM

## 2021-10-26 DIAGNOSIS — Z88.1 ALLERGY STATUS TO OTHER ANTIBIOTIC AGENTS STATUS: ICD-10-CM

## 2021-10-26 LAB — SARS-COV-2 RNA SPEC QL NAA+PROBE: SIGNIFICANT CHANGE UP

## 2021-10-26 PROCEDURE — 73610 X-RAY EXAM OF ANKLE: CPT | Mod: 26,RT

## 2021-10-26 PROCEDURE — 71045 X-RAY EXAM CHEST 1 VIEW: CPT | Mod: 26

## 2021-10-26 PROCEDURE — 73630 X-RAY EXAM OF FOOT: CPT | Mod: 26,RT

## 2021-10-26 PROCEDURE — 99284 EMERGENCY DEPT VISIT MOD MDM: CPT | Mod: 25

## 2021-10-26 RX ORDER — ALBUTEROL 90 UG/1
2 AEROSOL, METERED ORAL
Qty: 1 | Refills: 0
Start: 2021-10-26 | End: 2021-11-24

## 2021-10-26 RX ORDER — AZITHROMYCIN 500 MG/1
2 TABLET, FILM COATED ORAL
Qty: 6 | Refills: 0
Start: 2021-10-26 | End: 2021-10-28

## 2021-10-26 RX ORDER — IPRATROPIUM/ALBUTEROL SULFATE 18-103MCG
3 AEROSOL WITH ADAPTER (GRAM) INHALATION ONCE
Refills: 0 | Status: COMPLETED | OUTPATIENT
Start: 2021-10-26 | End: 2021-10-26

## 2021-10-26 RX ORDER — OXYCODONE AND ACETAMINOPHEN 5; 325 MG/1; MG/1
1 TABLET ORAL ONCE
Refills: 0 | Status: DISCONTINUED | OUTPATIENT
Start: 2021-10-26 | End: 2021-10-26

## 2021-10-26 RX ADMIN — Medication 3 MILLILITER(S): at 12:47

## 2021-10-26 RX ADMIN — OXYCODONE AND ACETAMINOPHEN 1 TABLET(S): 5; 325 TABLET ORAL at 13:46

## 2021-10-26 NOTE — ED ADULT TRIAGE NOTE - NSWEIGHTCALCTOOLDRUG_GEN_A_CORE
Adult Nutrition  Assessment/PES    Patient Name:  Jared Govea  YOB: 1955  MRN: [id number]  Admit Date:  8/30/2018    Assessment Date:  9/5/2018    Comments:  Good po, agree with current diet.   Educated pt's sister who cooks for pt & lives with her, see details below.           Reason for Assessment     Row Name 09/05/18 1040          Reason for Assessment    Reason For Assessment other (see comments)   CHF,  list     Diagnosis other (see comments)   Volume Overload/CHF, CKD IV, Hypoventilation syndrome/obese, DM              Nutrition/Diet History     Row Name 09/05/18 1041          Nutrition/Diet History    Typical Food/Fluid Intake Pt asleep w face mask, sister at bedside & primary cook & pt lives with her. She reports pt able to cook but usually doesn't & often convience/quick food b/c she (sister) raising 3 grandkids (has 11). Pt will at cooked cauliflower/broccoli w some olive oil if sister makes. Pt has her own fridge but will often let fruits/veggies go bad & choose other foods. Pt likes sweets helped sister brainstorm some alternatives. Pt likes direct salt so sister tries not to have around. Pt likes guevara sister reports fixes occasionally.              Anthropometrics     Row Name 09/05/18 1046 09/05/18 0644       Anthropometrics    Weight 122 kg (268 lb 15.4 oz) 122 kg (268 lb 8 oz)       Body Mass Index (BMI)    BMI Assessment BMI 40 or greater: obesity grade III  --            Labs/Tests/Procedures/Meds     Row Name 09/05/18 1047          Labs/Procedures/Meds    Lab Results Reviewed reviewed     Lab Results Comments  L to H        Diagnostic Tests/Procedures    Diagnostic Test/Procedure Reviewed reviewed        Medications    Pertinent Medications Reviewed reviewed     Pertinent Medications Comments lipitor, b/c/folic complex, bumex, insulin, risaquad             Physical Findings     Row Name 09/05/18 1051          Physical Findings    Overall Physical Appearance obese              Estimated/Assessed Needs     Row Name 09/05/18 1051          Calculation Measurements    Weight Used For Calculations 122 kg (268 lb 15.4 oz)        Estimated/Assessed Needs    Additional Documentation Calorie Requirements (Group);Fluid Requirements (Group);Protein Requirements (Group)        Calorie Requirements    Estimated Calorie Need Method Alcorn-St Jeor     Estimated Calorie Requirement Comment 1790 kcal ( mifflin no factor for obese)    201 gm CHO, 45 %kcal                                                                            Alcorn-St. Jeor Equation    RMR (Alcorn-St. Jeor Equation) 1791.75        Protein Requirements    Est Protein Requirement Amount (gms/kg) 0.8 gm protein     Estimated Protein Requirements (gms/day) 97.6        Fluid Requirements    Estimated Fluid Requirements (mL/day) --   1613-6917 ml CHF guidelines     Estimated Fluid Requirement Method other (see comments)                   Nutrition Prescription Ordered     Row Name 09/05/18 1052          Nutrition Prescription PO    Common Modifiers Cardiac;Consistent Carbohydrate             Evaluation of Received Nutrient/Fluid Intake     Row Name 09/05/18 1052 09/05/18 1051       Calculation Measurements    Weight Used For Calculations  -- 122 kg (268 lb 15.4 oz)       Fluid Intake Evaluation    Oral Fluid (mL) 1106   ave x 3, 67%  --       PO Evaluation    Number of Meals 9  --    % PO Intake 89  --            Evaluation of Prescribed Nutrient/Fluid Intake     Row Name 09/05/18 1051          Calculation Measurements    Weight Used For Calculations 122 kg (268 lb 15.4 oz)           Problem/Interventions:        Problem 1     Row Name 09/05/18 1053          Nutrition Diagnoses Problem 1    Problem 1 Limited Adherence to Diet Modifications     Etiology (related to) Medical Diagnosis     Cardiac CHF     Endocrine DM     Signs/Symptoms (evidenced by) BMI     BMI Greater than 40                     Intervention Goal     Row Name  09/05/18 1053          Intervention Goal    General Provide information regarding MNT for treatment/condition     PO PO intake (%)     PO Intake % 75 %   or greater     Weight Appropriate weight loss   bumex noted             Nutrition Intervention     Row Name 09/05/18 1053          Nutrition Intervention    RD/Tech Action Follow Tx progress               Education/Evaluation     Row Name 09/05/18 1054          Education    Education Education topics   Pt asleep thru visit, Edu sister ( who cooks for/lives with pt)  on avoid direct salt, avoid processed foods, making plate half veggies, alt choices for sweets (SF pudding, SF yogurt, micheal crackers).      Education Topics Diabetes;CHF;Na+   Dm Plate Method & Heart Failure Sheet with card provided         Monitor/Evaluation    Monitor Per protocol;I&O;PO intake;Pertinent labs;Weight;Symptoms     Education Follow-up Reinforce PRN   Sister verbalized understanding & asked appropriate questions. However compliance uncertain due to pt asleep thru education & non-compliance reported from home.          Electronically signed by:  Flor Valencia RD  09/05/18 10:56 AM    used

## 2021-10-26 NOTE — ED ADULT NURSE NOTE - NSIMPLEMENTINTERV_GEN_ALL_ED
Implemented All Fall Risk Interventions:  [place] to call system. Call bell, personal items and telephone within reach. Instruct patient to call for assistance. Room bathroom lighting operational. Non-slip footwear when patient is off stretcher. Physically safe environment: no spills, clutter or unnecessary equipment. Stretcher in lowest position, wheels locked, appropriate side rails in place. Provide visual cue, wrist band, yellow gown, etc. Monitor gait and stability. Monitor for mental status changes and reorient to person, place, and time. Review medications for side effects contributing to fall risk. Reinforce activity limits and safety measures with patient and family.

## 2021-10-26 NOTE — ED PROVIDER NOTE - OBJECTIVE STATEMENT
55 y/o female with PMHx COPD presents to the ED complaining of cough for 10 days with no fever. Patient states she has been coughing up white phlegm. Patient states she is tested for COVID-19 regularly and had a negative test 1 week ago. Patient denies shortness of breath or chest pain, calf pain or swelling. Patient seen at urgent care 10 days ago and was prescribed capsules for her cough but no antibiotics. Patient endorses smoking cigarettes. Additionally, patient states she had pots fall onto her ankle and now has right ankle pain which is worse with walking. Patient states she does not have a nebulizer at home.

## 2021-10-26 NOTE — ED PROVIDER NOTE - PROGRESS NOTE DETAILS
X-ray of right ankle negative. Likely contusion, will give RICE instructions. Ace bandage placed, crutches given, weight bearing tolerated. Chest X-ray negative for focal infiltrates. Patient improved after nebulizer. Will give prednisone and azithromycin for acute COPD exacerbation and albuterol MDI. Latriciaetn given return precautions. Will instruct patient to follow up with PMD this week. X-ray of right ankle and foot negative. Likely contusion, will give RICE instructions. Ace bandage placed, crutches given, weight bearing as tolerated. Chest X-ray negative for focal infiltrates. Patient improved after nebulizer. Will give prednisone and azithromycin for acute COPD exacerbation and albuterol MDI. Patietn given return precautions. Will instruct patient to follow up with PMD this week.

## 2021-10-26 NOTE — ED PROVIDER NOTE - PATIENT PORTAL LINK FT
You can access the FollowMyHealth Patient Portal offered by [facility] by registering at the following website: [email or web address]. By joining [employer]’s FollowMyHealth portal, you will also be able to view your health information using other applications (apps) compatible with our system.

## 2021-10-26 NOTE — ED PROVIDER NOTE - PHYSICAL EXAMINATION
Comfortable, no acute distress  NCAT  PERRL, EOMI  RRR  CTAB  soft, NTND  skin normal, no rashes  AAOx3, motor/sensory grossly intact. +tenderness to palpation over the medial and lateral malleolus with no deformity. 2+ pulses, SILT.

## 2021-10-26 NOTE — ED PROVIDER NOTE - NSFOLLOWUPINSTRUCTIONS_ED_ALL_ED_FT
CONTUSION IN ADULTS - Ambulatory Care           Contusion in Adults    AMBULATORY CARE:    A contusion is a bruise that appears on your skin after an injury. A bruise happens when small blood vessels tear but skin does not. Blood leaks into nearby tissue, such as soft tissue or muscle.    Other signs and symptoms you may have with a contusion:   •Pain that increases when you touch the bruise, walk, or use the area around the bruise      •Swelling or a lump at the site of the bruise or near it      •Red, blue, or black skin that may change to green or yellow after a few days      •Stiffness or problems moving the bruised area of your body      Seek care immediately if:   •You have new trouble moving the injured area.      •You have tingling or numbness in or near the injured area.      •Your hand or foot below the bruise gets cold or turns pale.       Call your doctor if:   •You find a new lump in the injured area.      •Your symptoms do not improve with treatment after 4 to 5 days.      •You have questions or concerns about your condition or care.      Treatment may not be needed. The following may be needed if you have a serious injury:  •NSAIDs, such as ibuprofen, help decrease swelling, pain, and fever. This medicine is available with or without a doctor's order. NSAIDs can cause stomach bleeding or kidney problems in certain people. If you take blood thinner medicine, always ask your healthcare provider if NSAIDs are safe for you. Always read the medicine label and follow directions.      •Prescription pain medicine may be given. Ask your healthcare provider how to take this medicine safely. Some prescription pain medicines contain acetaminophen. Do not take other medicines that contain acetaminophen without talking to your healthcare provider. Too much acetaminophen may cause liver damage. Prescription pain medicine may cause constipation. Ask your healthcare provider how to prevent or treat constipation.       •Aspiration is a procedure to drain pooled blood in your muscle. This may help prevent increased pressure in the muscle.      •Surgery may be done to repair a tear in the muscle or relieve pressure in the muscle caused by swelling.      Help a contusion heal:   •Rest the injured area or use it less than usual. If you bruised your leg or foot, you may need crutches or a cane to help you walk. This will help you keep weight off your injured body part.      •Apply ice to decrease swelling and pain. Ice may also help prevent tissue damage. Use an ice pack, or put crushed ice in a plastic bag. Cover it with a towel and place it on your bruise for 15 to 20 minutes every hour or as directed.      •Use compression to support the area and decrease swelling. Wrap an elastic bandage around the area over the bruised muscle. Make sure the bandage is not too tight. You should be able to fit 1 finger between the bandage and your skin.      •Elevate (raise) your injured body part above the level of your heart to help decrease pain and swelling. Use pillows, blankets, or rolled towels to elevate the area as often as you can.      •Do not drink alcohol as directed. Alcohol may slow healing.      •Do not stretch injured muscles right after your injury. Ask your healthcare provider when and how you may safely stretch after your injury. Gentle stretches can help increase your flexibility.      •Do not massage the area or put heating pads on the bruise right after your injury. Heat and massage may slow healing. Your healthcare provider may tell you to apply heat after several days. At that time, heat will start to help the injury heal.      Follow up with your doctor as directed: Write down your questions so you remember to ask them during your visits. CONTUSION IN ADULTS - Ambulatory Care           Contusion in Adults    AMBULATORY CARE:    A contusion is a bruise that appears on your skin after an injury. A bruise happens when small blood vessels tear but skin does not. Blood leaks into nearby tissue, such as soft tissue or muscle.    Other signs and symptoms you may have with a contusion:   •Pain that increases when you touch the bruise, walk, or use the area around the bruise                   CHRONIC BRONCHITIS - Discharge Care           Chronic Bronchitis    WHAT YOU NEED TO KNOW:    Chronic bronchitis is a long-term swelling and irritation in the air passages in your lungs. The irritation may damage your lungs. The lung damage often gets worse over time, and it is usually permanent. Chronic bronchitis is part of a group of lung diseases called chronic obstructive pulmonary disease (COPD).     DISCHARGE INSTRUCTIONS:    Call 911 for any of the following:   •You have new chest pain or tightness.       •You become confused, dizzy, or feel like you may faint.       •You have a new or increased gray or blue tint to your nail beds, fingers, or lips.       Seek care immediately if:   •You become tired easily from trying to get enough breath.       •The amount or color of your sputum changes, or your sputum becomes too hard to cough up.       Contact your healthcare provider if:   •You have a fever.      •You use your inhalers more often than usual.       •You have new or increased swelling in your legs, ankles, or abdomen.       •You run out of breath easily when you talk or do your usual exercise or activities.       •You have questions or concerns about your condition or care.       Medicines: You may need any of the following:   •Bronchodilators help open your airways so you can breathe easier. You may be given your medicine in a mist form so that you can breathe it into your lungs. Your medicine may be delivered through an inhaler or through a mask attached to oxygen. Ask your healthcare provider to show you how to use your inhaler correctly.   Metered Dose Inhaler           •Steroids help decrease inflammation in your airway so that you can breathe easier.      •Take your medicine as directed. Contact your healthcare provider if you think your medicine is not helping or if you have side effects. Tell him or her if you are allergic to any medicine. Keep a list of the medicines, vitamins, and herbs you take. Include the amounts, and when and why you take them. Bring the list or the pill bottles to follow-up visits. Carry your medicine list with you in case of an emergency.      You may need to go home on oxygen: You may need extra oxygen if your blood oxygen level is lower than it should be. You will be instructed on how to use oxygen in your home.     How to use an inhaler:   •Shake the inhaler well to make sure you get the correct amount of medicine per puff. Remove the cover from your inhaler's mouthpiece. If you use a spacer, connect your inhaler to the flat end of the spacer.   Inhaler with Spacer (Adult)           •Breathe out as much air from your lungs as you can. Put the mouthpiece in your mouth past your front teeth and rest it on the top of your tongue. Do not block the mouthpiece opening with your tongue.      •Breathe in through your mouth at a slow and steady rate. As you do this, press the inhaler to release the puff of medicine. Finish breathing in slowly and deeply as you inhale the medicine. When your lungs are full, hold your breath for 10 seconds. Then breathe out slowly through puckered lips or through your nose.      •If you need to take more puffs, wait at least 1 minute between puffs.       •Rinse your mouth with water after you use the inhaler. This may prevent a mouth infection or irritation.      •Follow the instructions that come with your inhaler to clean it. Clean your inhaler at least once a week.      Ways to help you breathe better:   •Take deep breaths and cough 10 times each hour. This will decrease your risk for a lung infection. Take a deep breath and hold it for as long as you can. Let the air out and then cough strongly. Deep breaths help open your airway. You may be given an incentive spirometer to help you take deep breaths. Put the plastic piece in your mouth and take a slow, deep breath. Then let the air out and cough. Repeat these steps 10 times every hour.       •Pursed-lip breathing can be used any time you feel short of breath. Pursed-lip breathing can be especially helpful before you start an activity: ?Breathe in through your nose. Use the muscles in your abdomen to help fill your lungs with air.      ?Slowly breathe out through your mouth with your lips slightly puckered. You should make a quiet hissing sound as you breathe out.      ?Try to take twice as long to breathe out as it did to breathe in. This helps you get rid of as much air from your lungs as possible.      ?Repeat this exercise several times. Once you are used to doing pursed-lip breathing, you can do it any time you need more air.   Breathe in Breathe out           •Diaphragmatic breathing can help strengthen some of the muscles you use to breathe: ?Place one hand on your stomach just below your ribs. Place your other hand in the middle of your chest over your breastbone.       ?Breathe in slowly through your nose, as deeply as you can.      ?Breathe out slowly through pursed lips. As you breathe out, tighten the muscles in your stomach. Use your hand to gently push in and up while tightening the muscles.      ?Diaphragmatic breathing takes practice. You may need to practice this many times a day. Slowly increase the amount of time you spend during each practice session.        Self-care:   •Sleep with your upper body raised. This will help you breathe easier. You can use foam wedges or elevate the head of your bed. Many devices are available to help raise your upper body while in bed. Use a device that will tilt your whole body, or bend your body at the waist. The device should not bend your body at the upper back or neck.       •Prevent the spread of germs: ?Wash your hands often with soap and water. Carry germ-killing gel with you. You can use the gel to clean your hands when soap and water are not available.   Handwashing           ?Do not touch your eyes, nose, or mouth unless you have washed your hands first.       ?Always cover your mouth when you cough. Cough into a tissue or your shirtsleeve so you do not spread germs from your hands.       ?Try to avoid people who have a cold or the flu. If you are sick, stay away from others as much as possible.       •Do not smoke. Nicotine and other substances can cause lung damage. Do not use e-cigarettes or smokeless tobacco without first talking to your healthcare provider. They still contain nicotine. Ask your healthcare provider for information if you currently smoke and need help to quit.       •Avoid lung irritants. Stay out of high altitudes and places with high humidity. Stay inside, or cover your mouth and nose with a scarf when you are outside during cold weather. Stay inside on days when air pollution or pollen counts are high. Do not use aerosol sprays such as deodorant, bug spray, and hair spray.       •Drink more liquids. This will help to keep your air passages moist and help you cough up mucus. Ask how much liquid to drink each day and which liquids are best for you.      •Ask your healthcare provider about the flu and pneumonia vaccines. All adults should get the flu (influenza) vaccine as soon as recommended each year, usually in September or October. The pneumonia vaccine is given to adults aged 65 or older to prevent pneumococcal disease, such as pneumonia. Adults aged 19 to 64 years who are at high risk for pneumococcal disease also should get the pneumococcal vaccine. It may need to be repeated 1 or 5 years later.      Follow up with your healthcare provider as directed: Write down your questions so you can remember to ask them during your visits.        © Copyright [employer] 2021                        •Swelling or a lump at the site of the bruise or near it      •Red, blue, or black skin that may change to green or yellow after a few days      •Stiffness or problems moving the bruised area of your body      Seek care immediately if:   •You have new trouble moving the injured area.      •You have tingling or numbness in or near the injured area.      •Your hand or foot below the bruise gets cold or turns pale.       Call your doctor if:   •You find a new lump in the injured area.      •Your symptoms do not improve with treatment after 4 to 5 days.      •You have questions or concerns about your condition or care.      Treatment may not be needed. The following may be needed if you have a serious injury:  •NSAIDs, such as ibuprofen, help decrease swelling, pain, and fever. This medicine is available with or without a doctor's order. NSAIDs can cause stomach bleeding or kidney problems in certain people. If you take blood thinner medicine, always ask your healthcare provider if NSAIDs are safe for you. Always read the medicine label and follow directions.      •Prescription pain medicine may be given. Ask your healthcare provider how to take this medicine safely. Some prescription pain medicines contain acetaminophen. Do not take other medicines that contain acetaminophen without talking to your healthcare provider. Too much acetaminophen may cause liver damage. Prescription pain medicine may cause constipation. Ask your healthcare provider how to prevent or treat constipation.       •Aspiration is a procedure to drain pooled blood in your muscle. This may help prevent increased pressure in the muscle.      •Surgery may be done to repair a tear in the muscle or relieve pressure in the muscle caused by swelling.      Help a contusion heal:   •Rest the injured area or use it less than usual. If you bruised your leg or foot, you may need crutches or a cane to help you walk. This will help you keep weight off your injured body part.      •Apply ice to decrease swelling and pain. Ice may also help prevent tissue damage. Use an ice pack, or put crushed ice in a plastic bag. Cover it with a towel and place it on your bruise for 15 to 20 minutes every hour or as directed.      •Use compression to support the area and decrease swelling. Wrap an elastic bandage around the area over the bruised muscle. Make sure the bandage is not too tight. You should be able to fit 1 finger between the bandage and your skin.      •Elevate (raise) your injured body part above the level of your heart to help decrease pain and swelling. Use pillows, blankets, or rolled towels to elevate the area as often as you can.      •Do not drink alcohol as directed. Alcohol may slow healing.      •Do not stretch injured muscles right after your injury. Ask your healthcare provider when and how you may safely stretch after your injury. Gentle stretches can help increase your flexibility.      •Do not massage the area or put heating pads on the bruise right after your injury. Heat and massage may slow healing. Your healthcare provider may tell you to apply heat after several days. At that time, heat will start to help the injury heal.      Follow up with your doctor as directed: Write down your questions so you remember to ask them during your visits.

## 2021-10-26 NOTE — ED PROVIDER NOTE - CLINICAL SUMMARY MEDICAL DECISION MAKING FREE TEXT BOX
57 y/o presents with likely acute COPD exacerbation. Will get X-ray to rule out PE, pulmonary edema, pneumothorax, and get X-ray of ankle to rule out fracture. Will give nebs, COVID-19 swab. Patient O2-satting well, with unlabored breathing.

## 2021-12-06 NOTE — ED PROVIDER NOTE - NS_ ATTENDINGSCRIBEDETAILS _ED_A_ED_FT
MD Statement: I personally performed the services described in the documentation, reviewed the documentation recorded by the scribe in my presence and it accurately and completely records my words and actions Intellectual disability

## 2021-12-20 NOTE — ED PROVIDER NOTE - PMH
Anxiety    Asthma    Chronic pain    Heart disease    HTN (hypertension) General Sunscreen Counseling: I recommended a broad spectrum sunscreen with a SPF of 30 or higher specifically one with a zinc oxide. I explained that SPF 30 sunscreens block approximately 97 percent of the sun's harmful rays.  Sunscreens should be applied at least 15 minutes prior to expected sun exposure and then every 2 hours after that as long as sun exposure continues. If swimming or exercising sunscreen should be reapplied every 45 minutes to an hour after getting wet or sweating.  One ounce, or the equivalent of a shot glass full of sunscreen, is adequate to protect the skin not covered by a bathing suit. I also recommended a lip balm with a sunscreen as well. Sun protective clothing can be used in lieu of sunscreen but must be worn the entire time you are exposed to the sun's rays. Advised patient to avoid tanning beds. Discussed dangers and increased risk for melanoma. Patient understands Products Recommended: Elta MD Detail Level: Zone General Sunscreen Counseling: I recommended a broad spectrum sunscreen with a SPF of 30 or higher specifically one with a zinc oxide. I explained that SPF 30 sunscreens block approximately 97 percent of the sun's harmful rays.  Sunscreens should be applied at least 15 minutes prior to expected sun exposure and then every 2 hours after that as long as sun exposure continues. If swimming or exercising sunscreen should be reapplied every 45 minutes to an hour after getting wet or sweating.  One ounce, or the equivalent of a shot glass full of sunscreen, is adequate to protect the skin not covered by a bathing suit. I also recommended a lip balm with a sunscreen as well. Sun protective clothing can be used in lieu of sunscreen but must be worn the entire time you are exposed to the sun's rays. Detail Level: Generalized

## 2022-02-02 NOTE — ED ADULT NURSE NOTE - NSSISCREENINGQ3_ED_A_ED
----- Message from Sheri Tang sent at 2/2/2022 11:26 AM CST -----  Regarding: return call  Contact: Patient/[phone] (home)  Type:  Patient Returning Call    Who Called:  Patient/[phone] ([place])     Who Left Message for Patient:  Amy  Does the patient know what this is regarding?:  no        
Returned call to patient in regards to current symptoms. Per provider , patient will need an appointment. No answer , left voicemail to return call.   
No

## 2022-03-04 ENCOUNTER — EMERGENCY (EMERGENCY)
Facility: HOSPITAL | Age: 57
LOS: 1 days | Discharge: LEFT BEFORE DISCHARGE | End: 2022-03-04
Attending: EMERGENCY MEDICINE | Admitting: EMERGENCY MEDICINE
Payer: MEDICARE

## 2022-03-04 VITALS
TEMPERATURE: 98 F | RESPIRATION RATE: 18 BRPM | WEIGHT: 179.9 LBS | OXYGEN SATURATION: 95 % | DIASTOLIC BLOOD PRESSURE: 98 MMHG | HEART RATE: 87 BPM | HEIGHT: 61 IN | SYSTOLIC BLOOD PRESSURE: 164 MMHG

## 2022-03-04 DIAGNOSIS — K64.8 OTHER HEMORRHOIDS: Chronic | ICD-10-CM

## 2022-03-04 PROCEDURE — L9991: CPT

## 2022-03-04 NOTE — ED ADULT TRIAGE NOTE - CHIEF COMPLAINT QUOTE
c/o pain to L eye reports having a knot to the L side of eye for the past week, yesterday eyelid became swollen with photosensitivity and sight slightly blurry.

## 2022-03-07 DIAGNOSIS — Z53.21 PROCEDURE AND TREATMENT NOT CARRIED OUT DUE TO PATIENT LEAVING PRIOR TO BEING SEEN BY HEALTH CARE PROVIDER: ICD-10-CM

## 2022-03-09 NOTE — ED PROVIDER NOTE - MUSCULOSKELETAL NEGATIVE STATEMENT, MLM
0700 Bedside shift change report given to DAE Snow (oncoming nurse) by DAE Dowd (offgoing nurse). Report included the following information SBAR, Kardex, Intake/Output, MAR, Recent Results and Cardiac Rhythm NSR.     0745 Pt off the floor for abdominal XR.     0845 Pt back in room after XR. AM meds given. 4413 PT/OT at bedside working with pt. Pt walking in the thomas. 1200 Pt repositioned back into bed at this time. Attempting to get PT/INR now to determine warfarin dosing for this evening. Updated pt on plan of care. 1440 Pt has bed on medical telemetry unit. Will call report shortly. 1530 TRANSFER - OUT REPORT:    Verbal report given to Kiki Jenkins RN (name) on Rose Alcaraz  being transferred to [facility] (unit) for routine progression of care       Report consisted of patients Situation, Background, Assessment and   Recommendations(SBAR). Information from the following report(s) SBAR, Kardex, Intake/Output, MAR, Recent Results and Cardiac Rhythm NSR/Sinus Tach was reviewed with the receiving nurse. Lines:   Peripheral IV 03/08/22 Left Antecubital (Active)   Site Assessment Clean, dry, & intact 03/09/22 1432   Phlebitis Assessment 0 03/09/22 1432   Infiltration Assessment 0 03/09/22 1432   Dressing Status Clean, dry, & intact 03/09/22 1432   Dressing Type Tape;Transparent 03/09/22 1432   Hub Color/Line Status Pink;Flushed 03/09/22 1432   Action Taken Open ports on tubing capped 03/08/22 0425   Alcohol Cap Used No 03/09/22 1432        Opportunity for questions and clarification was provided.       Patient transported with:   Monitor  O2 @ 3L liters  Patient-specific medications from Pharmacy  Tech no back pain, no gout, no musculoskeletal pain, no neck pain, and no weakness.  + left chest wall pain

## 2022-04-28 NOTE — ED PROVIDER NOTE - EYES [+], MLM
DISCHARGE/REDNESS Opzelura Counseling:  I discussed with the patient the risks of Opzelura including but not limited to nasopharngitis, bronchitis, ear infection, eosinophila, hives, diarrhea, folliculitis, tonsillitis, and rhinorrhea.  Taken orally, this medication has been linked to serious infections; higher rate of mortality; malignancy and lymphoproliferative disorders; major adverse cardiovascular events; thrombosis; thrombocytopenia, anemia, and neutropenia; and lipid elevations.

## 2022-05-16 NOTE — ED PROVIDER NOTE - CROS ED NEURO ALL NEG
[FreeTextEntry1] : Mr Kwok returns tot he office today with continued pain in the mid thoracic spine. AT times the pain radiates around the front of his chest and makes it difficult to breathe due to the pain. Describes it as sharp. After imaging was found to have endplate compression fractures of the thoracic spine. The diclofenac gel that was prescribed is no helping. Has been using Tylenol or Motrin for pain control. \par \par Regarding his Parkinsons, is scheduled to follow-up next week with the Neurologist with a televideo appointment. 9, T11 and T12.\par \par PE:\par Constitutional: Well appearing, no distress sitting on the chair in mild discomfort\par HEENT: Normocephalic Atraumatic, sclerae anicteric, mucus membranes moist, trachea midline\par Psychiatric: Alert and oriented to all spheres, normal mood\par Pulmonary: No respiratory distress or accessory muscle use\par \par Neurologic:\par CN II-XII grossly intact \par Palpation: Mild tenderness to palpation thoracic paraspinal musculature. \par Strength: Full strength in all major muscle groups, no atrophy\par Sensation: Full sensation to light touch in all extremities\par \par Reflexes:\par 2+ patellar\par 2+ biceps\par 2+ ankle jerk\par No Ellis's\par No clonus\par No babinski\par \par ROM intact through all planes with passive movement\par \par Signs:\par SLR negative; Sahil's maneuver negative\par \par Gait: WNL with wide stance\par  negative...

## 2022-05-18 ENCOUNTER — EMERGENCY (EMERGENCY)
Facility: HOSPITAL | Age: 57
LOS: 1 days | Discharge: HOME | End: 2022-05-18
Attending: EMERGENCY MEDICINE | Admitting: EMERGENCY MEDICINE
Payer: MEDICARE

## 2022-05-18 VITALS
TEMPERATURE: 98 F | OXYGEN SATURATION: 98 % | DIASTOLIC BLOOD PRESSURE: 94 MMHG | SYSTOLIC BLOOD PRESSURE: 167 MMHG | HEART RATE: 86 BPM | WEIGHT: 186.95 LBS | RESPIRATION RATE: 18 BRPM | HEIGHT: 61 IN

## 2022-05-18 VITALS
DIASTOLIC BLOOD PRESSURE: 84 MMHG | HEART RATE: 71 BPM | OXYGEN SATURATION: 97 % | TEMPERATURE: 98 F | SYSTOLIC BLOOD PRESSURE: 145 MMHG | RESPIRATION RATE: 18 BRPM

## 2022-05-18 DIAGNOSIS — Z91.018 ALLERGY TO OTHER FOODS: ICD-10-CM

## 2022-05-18 DIAGNOSIS — Z88.5 ALLERGY STATUS TO NARCOTIC AGENT: ICD-10-CM

## 2022-05-18 DIAGNOSIS — Z88.1 ALLERGY STATUS TO OTHER ANTIBIOTIC AGENTS STATUS: ICD-10-CM

## 2022-05-18 DIAGNOSIS — F32.A DEPRESSION, UNSPECIFIED: ICD-10-CM

## 2022-05-18 DIAGNOSIS — I25.10 ATHEROSCLEROTIC HEART DISEASE OF NATIVE CORONARY ARTERY WITHOUT ANGINA PECTORIS: ICD-10-CM

## 2022-05-18 DIAGNOSIS — F17.210 NICOTINE DEPENDENCE, CIGARETTES, UNCOMPLICATED: ICD-10-CM

## 2022-05-18 DIAGNOSIS — I10 ESSENTIAL (PRIMARY) HYPERTENSION: ICD-10-CM

## 2022-05-18 DIAGNOSIS — M47.9 SPONDYLOSIS, UNSPECIFIED: ICD-10-CM

## 2022-05-18 DIAGNOSIS — R51.9 HEADACHE, UNSPECIFIED: ICD-10-CM

## 2022-05-18 DIAGNOSIS — M50.321 OTHER CERVICAL DISC DEGENERATION AT C4-C5 LEVEL: ICD-10-CM

## 2022-05-18 DIAGNOSIS — K64.8 OTHER HEMORRHOIDS: Chronic | ICD-10-CM

## 2022-05-18 DIAGNOSIS — G47.33 OBSTRUCTIVE SLEEP APNEA (ADULT) (PEDIATRIC): ICD-10-CM

## 2022-05-18 DIAGNOSIS — E78.5 HYPERLIPIDEMIA, UNSPECIFIED: ICD-10-CM

## 2022-05-18 DIAGNOSIS — I25.2 OLD MYOCARDIAL INFARCTION: ICD-10-CM

## 2022-05-18 DIAGNOSIS — Z79.82 LONG TERM (CURRENT) USE OF ASPIRIN: ICD-10-CM

## 2022-05-18 DIAGNOSIS — J44.9 CHRONIC OBSTRUCTIVE PULMONARY DISEASE, UNSPECIFIED: ICD-10-CM

## 2022-05-18 DIAGNOSIS — M54.9 DORSALGIA, UNSPECIFIED: ICD-10-CM

## 2022-05-18 DIAGNOSIS — M48.00 SPINAL STENOSIS, SITE UNSPECIFIED: ICD-10-CM

## 2022-05-18 DIAGNOSIS — M50.31 OTHER CERVICAL DISC DEGENERATION, HIGH CERVICAL REGION: ICD-10-CM

## 2022-05-18 DIAGNOSIS — M99.63 OSSEOUS AND SUBLUXATION STENOSIS OF INTERVERTEBRAL FORAMINA OF LUMBAR REGION: ICD-10-CM

## 2022-05-18 DIAGNOSIS — Z86.73 PERSONAL HISTORY OF TRANSIENT ISCHEMIC ATTACK (TIA), AND CEREBRAL INFARCTION WITHOUT RESIDUAL DEFICITS: ICD-10-CM

## 2022-05-18 DIAGNOSIS — M19.90 UNSPECIFIED OSTEOARTHRITIS, UNSPECIFIED SITE: ICD-10-CM

## 2022-05-18 DIAGNOSIS — G89.29 OTHER CHRONIC PAIN: ICD-10-CM

## 2022-05-18 DIAGNOSIS — F41.9 ANXIETY DISORDER, UNSPECIFIED: ICD-10-CM

## 2022-05-18 LAB
ALBUMIN SERPL ELPH-MCNC: 3.9 G/DL — SIGNIFICANT CHANGE UP (ref 3.4–5)
ALP SERPL-CCNC: 85 U/L — SIGNIFICANT CHANGE UP (ref 40–120)
ALT FLD-CCNC: 28 U/L — SIGNIFICANT CHANGE UP (ref 12–42)
ANION GAP SERPL CALC-SCNC: 8 MMOL/L — LOW (ref 9–16)
AST SERPL-CCNC: 18 U/L — SIGNIFICANT CHANGE UP (ref 15–37)
BILIRUB SERPL-MCNC: 0.2 MG/DL — SIGNIFICANT CHANGE UP (ref 0.2–1.2)
BUN SERPL-MCNC: 15 MG/DL — SIGNIFICANT CHANGE UP (ref 7–23)
CALCIUM SERPL-MCNC: 9.5 MG/DL — SIGNIFICANT CHANGE UP (ref 8.5–10.5)
CHLORIDE SERPL-SCNC: 104 MMOL/L — SIGNIFICANT CHANGE UP (ref 96–108)
CO2 SERPL-SCNC: 29 MMOL/L — SIGNIFICANT CHANGE UP (ref 22–31)
CREAT SERPL-MCNC: 0.76 MG/DL — SIGNIFICANT CHANGE UP (ref 0.5–1.3)
EGFR: 92 ML/MIN/1.73M2 — SIGNIFICANT CHANGE UP
GLUCOSE SERPL-MCNC: 94 MG/DL — SIGNIFICANT CHANGE UP (ref 70–99)
HCT VFR BLD CALC: 41.4 % — SIGNIFICANT CHANGE UP (ref 34.5–45)
HGB BLD-MCNC: 13.5 G/DL — SIGNIFICANT CHANGE UP (ref 11.5–15.5)
MCHC RBC-ENTMCNC: 28.8 PG — SIGNIFICANT CHANGE UP (ref 27–34)
MCHC RBC-ENTMCNC: 32.6 GM/DL — SIGNIFICANT CHANGE UP (ref 32–36)
MCV RBC AUTO: 88.5 FL — SIGNIFICANT CHANGE UP (ref 80–100)
NRBC # BLD: 0 /100 WBCS — SIGNIFICANT CHANGE UP (ref 0–0)
PLATELET # BLD AUTO: 174 K/UL — SIGNIFICANT CHANGE UP (ref 150–400)
POTASSIUM SERPL-MCNC: 4.1 MMOL/L — SIGNIFICANT CHANGE UP (ref 3.5–5.3)
POTASSIUM SERPL-SCNC: 4.1 MMOL/L — SIGNIFICANT CHANGE UP (ref 3.5–5.3)
PROT SERPL-MCNC: 8 G/DL — SIGNIFICANT CHANGE UP (ref 6.4–8.2)
RBC # BLD: 4.68 M/UL — SIGNIFICANT CHANGE UP (ref 3.8–5.2)
RBC # FLD: 13.2 % — SIGNIFICANT CHANGE UP (ref 10.3–14.5)
SODIUM SERPL-SCNC: 141 MMOL/L — SIGNIFICANT CHANGE UP (ref 132–145)
WBC # BLD: 9.94 K/UL — SIGNIFICANT CHANGE UP (ref 3.8–10.5)
WBC # FLD AUTO: 9.94 K/UL — SIGNIFICANT CHANGE UP (ref 3.8–10.5)

## 2022-05-18 PROCEDURE — 70450 CT HEAD/BRAIN W/O DYE: CPT | Mod: 26

## 2022-05-18 PROCEDURE — 72125 CT NECK SPINE W/O DYE: CPT | Mod: 26

## 2022-05-18 PROCEDURE — 99285 EMERGENCY DEPT VISIT HI MDM: CPT

## 2022-05-18 RX ORDER — METOCLOPRAMIDE HCL 10 MG
10 TABLET ORAL ONCE
Refills: 0 | Status: COMPLETED | OUTPATIENT
Start: 2022-05-18 | End: 2022-05-18

## 2022-05-18 RX ORDER — SODIUM CHLORIDE 9 MG/ML
1000 INJECTION INTRAMUSCULAR; INTRAVENOUS; SUBCUTANEOUS ONCE
Refills: 0 | Status: COMPLETED | OUTPATIENT
Start: 2022-05-18 | End: 2022-05-18

## 2022-05-18 RX ORDER — DIAZEPAM 5 MG
5 TABLET ORAL ONCE
Refills: 0 | Status: DISCONTINUED | OUTPATIENT
Start: 2022-05-18 | End: 2022-05-18

## 2022-05-18 RX ORDER — KETOROLAC TROMETHAMINE 30 MG/ML
15 SYRINGE (ML) INJECTION ONCE
Refills: 0 | Status: DISCONTINUED | OUTPATIENT
Start: 2022-05-18 | End: 2022-05-18

## 2022-05-18 RX ORDER — METHOCARBAMOL 500 MG/1
2 TABLET, FILM COATED ORAL
Qty: 20 | Refills: 0
Start: 2022-05-18 | End: 2022-05-22

## 2022-05-18 RX ORDER — DIAZEPAM 5 MG
2 TABLET ORAL
Qty: 10 | Refills: 0
Start: 2022-05-18 | End: 2022-05-22

## 2022-05-18 RX ORDER — DEXAMETHASONE 0.5 MG/5ML
10 ELIXIR ORAL ONCE
Refills: 0 | Status: COMPLETED | OUTPATIENT
Start: 2022-05-18 | End: 2022-05-18

## 2022-05-18 RX ADMIN — Medication 5 MILLIGRAM(S): at 16:58

## 2022-05-18 RX ADMIN — Medication 5 MILLIGRAM(S): at 20:10

## 2022-05-18 RX ADMIN — Medication 15 MILLIGRAM(S): at 16:59

## 2022-05-18 RX ADMIN — Medication 10 MILLIGRAM(S): at 16:58

## 2022-05-18 RX ADMIN — SODIUM CHLORIDE 2000 MILLILITER(S): 9 INJECTION INTRAMUSCULAR; INTRAVENOUS; SUBCUTANEOUS at 16:59

## 2022-05-18 RX ADMIN — Medication 102 MILLIGRAM(S): at 16:58

## 2022-05-18 NOTE — ED ADULT TRIAGE NOTE - HEIGHT IN FEET
Eats softer diet at home, cut-up foods (such as runny eggs, mashed potatoes). Also has PEG tube for supplemental feedings. Takes Isosource 1.5 Julio 250 ml bolus 3-4x per day for additional 1606-1874 kcal daily and 51-68 grams of protein daily.
5

## 2022-05-18 NOTE — ED ADULT NURSE NOTE - CHIEF COMPLAINT QUOTE
Pt complaining of migraine x 3 weeks. Pt denies blurry vison and light sensitivity. Pt with no slurred speech. NO facial droop. Pt with history of CVA x 2. Pt with residual weakness to the right side from previous CVA.

## 2022-05-18 NOTE — ED PROVIDER NOTE - NSFOLLOWUPINSTRUCTIONS_ED_ALL_ED_FT
Headache- tension headache suspected    A headache is pain or discomfort felt around the head or neck area. The specific cause of a headache may not be found as there are many types including tension headaches, migraine headaches, and cluster headaches. Watch your condition for any changes. Things you can do to manage your pain include taking over the counter and prescription medications as instructed by your health care provider, lying down in a dark quiet room, limiting stress, getting regular sleep, and refraining from alcohol and tobacco products.    SEEK IMMEDIATE MEDICAL CARE IF YOU HAVE ANY OF THE FOLLOWING SYMPTOMS: fever, vomiting, stiff neck, loss of vision, problems with speech, muscle weakness, loss of balance, trouble walking, passing out, or confusion.     Consider follow up with Dr. Holcomb or Dr. Blair at [facility]. They are osteopathic MDs who can do bone and tissue adjustments as well as set up high quality physical therapy.   [address] [place] [address], [place] [address]  Phone: [phone]    Consider follow up with Dr. Ellis Ramos (local Chiropractor)  [address]  [phone]     Consider follow up with one of our Neurologists    Muscle relaxants 1-2 times a day fr a few days as needed with OTC Motrin/Advil (ibuprofen) 600mg every 6h and/or Tylenol (acetaminophen) 1000mg every 6h for pain.       Return for worse pain, new worrisome symptoms or other medical emergencies.

## 2022-05-18 NOTE — ED ADULT TRIAGE NOTE - CHIEF COMPLAINT QUOTE
Pt complaining of migraine x 3 weeks. Pt denies blurry vison and light sensitivity. Pt with history of CVA x 2. Pt complaining of migraine x 3 weeks. Pt denies blurry vison and light sensitivity. Pt with no slurred speech. NO facial droop. Pt with history of CVA x 2. Pt with residual weakness to the right side from previous CVA.

## 2022-05-18 NOTE — ED PROVIDER NOTE - OBJECTIVE STATEMENT
57 y/o F with allergy to Vancomycin presents to ED c/o migraine x3 weeks. Patient states that the headache started at her forehead but then radiated to her face, ears, and back of neck x3 days ago. She describes her head as being very heavy with constant intensity of pain residing all day. No prior history of migraines. Patient reports taking medications such as oxycodone and Naproxen for other medical conditions but states they only relieve her headache symptoms for about 30 minutes which is when she can get some sleep. Denies nausea or vomiting. Reports history of chronic back pain and history of headaches when she didn't have her glasses. Patient has since ordered new glasses.  No recent travel or close sick contacts. Patient noted that she has recently lost her daughter (<1 year ago) and is now responsible for 3 young children. No recent exposure to lime disease.

## 2022-05-18 NOTE — ED PROVIDER NOTE - CLINICAL SUMMARY MEDICAL DECISION MAKING FREE TEXT BOX
Patient presenting with headache x3 weeks with no history of migraines. Symptoms mostly consistent with tension headache given neck pain. Will check CT cervical spine and CT head and give trial of headache cocktail with Decadron and Valium.

## 2022-05-18 NOTE — ED PROVIDER NOTE - PATIENT PORTAL LINK FT
You can access the FollowMyHealth Patient Portal offered by [facility] by registering at the following website: [email or web address]. By joining [employer]’s FollowMyHealth portal, you will also be able to view your health information using other applications (apps) compatible with our system.

## 2022-05-18 NOTE — ED PROVIDER NOTE - CARE PROVIDER_API CALL
Damián Owens)  Neurology; Neuromuscular Medicine  [address], [address] [place] [address]  Phone: [phone]  Fax: [phone]  Follow Up Time:     Bertha Rodriguez)  Neurology; Vascular Neurology  [address] [address]  Phone: [phone]  Fax: [phone]  Follow Up Time:

## 2022-05-18 NOTE — ED PROVIDER NOTE - CARE PROVIDERS DIRECT ADDRESSES
,[email or web address]

## 2022-09-28 NOTE — ED CDU PROVIDER NOTE - CONSTITUTIONAL [+], MLM
September 28, 2022     Jeremy Dickson DO  [facility]  [address] [address]    Patient: Major Peon   YOB: 1984   Date of Visit: 9/28/2022       Dear Dr Elmer Fernandes:    Thank you for referring Donis Bates to me for evaluation  Below are my notes for this consultation  If you have questions, please do not hesitate to call me  I look forward to following your patient along with you  Sincerely,        Barbara Leung MD        CC: No Recipients  Barbara Leung MD  9/28/2022 10:57 AM  Sign when Signing Visit  [address]: Ms Fabricio Bland was seen today at 32w1d for fetal growth assessment ultrasound  See ultrasound report under "OB Procedures" tab    Please don't hesitate to contact our office with any concerns or questions   -Barbara Leung malaise

## 2022-10-19 ENCOUNTER — EMERGENCY (EMERGENCY)
Facility: HOSPITAL | Age: 57
LOS: 1 days | Discharge: HOME | End: 2022-10-19
Attending: EMERGENCY MEDICINE | Admitting: EMERGENCY MEDICINE

## 2022-10-19 VITALS
HEIGHT: 61 IN | DIASTOLIC BLOOD PRESSURE: 99 MMHG | HEART RATE: 90 BPM | TEMPERATURE: 98 F | WEIGHT: 179.02 LBS | SYSTOLIC BLOOD PRESSURE: 151 MMHG | RESPIRATION RATE: 18 BRPM | OXYGEN SATURATION: 95 %

## 2022-10-19 VITALS
DIASTOLIC BLOOD PRESSURE: 70 MMHG | OXYGEN SATURATION: 98 % | SYSTOLIC BLOOD PRESSURE: 162 MMHG | HEART RATE: 88 BPM | RESPIRATION RATE: 18 BRPM | TEMPERATURE: 98 F

## 2022-10-19 DIAGNOSIS — K64.8 OTHER HEMORRHOIDS: Chronic | ICD-10-CM

## 2022-10-19 LAB
ALBUMIN SERPL ELPH-MCNC: 3.6 G/DL — SIGNIFICANT CHANGE UP (ref 3.4–5)
ALP SERPL-CCNC: 76 U/L — SIGNIFICANT CHANGE UP (ref 40–120)
ALT FLD-CCNC: 27 U/L — SIGNIFICANT CHANGE UP (ref 12–42)
ANION GAP SERPL CALC-SCNC: 7 MMOL/L — LOW (ref 9–16)
APTT BLD: 28.8 SEC — SIGNIFICANT CHANGE UP (ref 27.5–35.5)
AST SERPL-CCNC: 13 U/L — LOW (ref 15–37)
BASOPHILS # BLD AUTO: 0.05 K/UL — SIGNIFICANT CHANGE UP (ref 0–0.2)
BASOPHILS NFR BLD AUTO: 0.7 % — SIGNIFICANT CHANGE UP (ref 0–2)
BILIRUB SERPL-MCNC: 0.2 MG/DL — SIGNIFICANT CHANGE UP (ref 0.2–1.2)
BUN SERPL-MCNC: 14 MG/DL — SIGNIFICANT CHANGE UP (ref 7–23)
CALCIUM SERPL-MCNC: 9.4 MG/DL — SIGNIFICANT CHANGE UP (ref 8.5–10.5)
CHLORIDE SERPL-SCNC: 104 MMOL/L — SIGNIFICANT CHANGE UP (ref 96–108)
CO2 SERPL-SCNC: 27 MMOL/L — SIGNIFICANT CHANGE UP (ref 22–31)
CREAT SERPL-MCNC: 0.71 MG/DL — SIGNIFICANT CHANGE UP (ref 0.5–1.3)
D DIMER BLD IA.RAPID-MCNC: <187 NG/ML DDU — SIGNIFICANT CHANGE UP
EGFR: 99 ML/MIN/1.73M2 — SIGNIFICANT CHANGE UP
EOSINOPHIL # BLD AUTO: 0.26 K/UL — SIGNIFICANT CHANGE UP (ref 0–0.5)
EOSINOPHIL NFR BLD AUTO: 3.7 % — SIGNIFICANT CHANGE UP (ref 0–6)
GLUCOSE SERPL-MCNC: 130 MG/DL — HIGH (ref 70–99)
HCT VFR BLD CALC: 42.5 % — SIGNIFICANT CHANGE UP (ref 34.5–45)
HGB BLD-MCNC: 14 G/DL — SIGNIFICANT CHANGE UP (ref 11.5–15.5)
IMM GRANULOCYTES NFR BLD AUTO: 0.4 % — SIGNIFICANT CHANGE UP (ref 0–0.9)
INR BLD: 1.07 — SIGNIFICANT CHANGE UP (ref 0.88–1.16)
LYMPHOCYTES # BLD AUTO: 2.37 K/UL — SIGNIFICANT CHANGE UP (ref 1–3.3)
LYMPHOCYTES # BLD AUTO: 34.1 % — SIGNIFICANT CHANGE UP (ref 13–44)
MAGNESIUM SERPL-MCNC: 2.2 MG/DL — SIGNIFICANT CHANGE UP (ref 1.6–2.6)
MCHC RBC-ENTMCNC: 28.6 PG — SIGNIFICANT CHANGE UP (ref 27–34)
MCHC RBC-ENTMCNC: 32.9 GM/DL — SIGNIFICANT CHANGE UP (ref 32–36)
MCV RBC AUTO: 86.7 FL — SIGNIFICANT CHANGE UP (ref 80–100)
MONOCYTES # BLD AUTO: 0.46 K/UL — SIGNIFICANT CHANGE UP (ref 0–0.9)
MONOCYTES NFR BLD AUTO: 6.6 % — SIGNIFICANT CHANGE UP (ref 2–14)
NEUTROPHILS # BLD AUTO: 3.79 K/UL — SIGNIFICANT CHANGE UP (ref 1.8–7.4)
NEUTROPHILS NFR BLD AUTO: 54.5 % — SIGNIFICANT CHANGE UP (ref 43–77)
NRBC # BLD: 0 /100 WBCS — SIGNIFICANT CHANGE UP (ref 0–0)
PLATELET # BLD AUTO: 198 K/UL — SIGNIFICANT CHANGE UP (ref 150–400)
POTASSIUM SERPL-MCNC: 3.8 MMOL/L — SIGNIFICANT CHANGE UP (ref 3.5–5.3)
POTASSIUM SERPL-SCNC: 3.8 MMOL/L — SIGNIFICANT CHANGE UP (ref 3.5–5.3)
PROT SERPL-MCNC: 7.5 G/DL — SIGNIFICANT CHANGE UP (ref 6.4–8.2)
PROTHROM AB SERPL-ACNC: 12.4 SEC — SIGNIFICANT CHANGE UP (ref 10.5–13.4)
RBC # BLD: 4.9 M/UL — SIGNIFICANT CHANGE UP (ref 3.8–5.2)
RBC # FLD: 14.1 % — SIGNIFICANT CHANGE UP (ref 10.3–14.5)
SODIUM SERPL-SCNC: 138 MMOL/L — SIGNIFICANT CHANGE UP (ref 132–145)
TROPONIN I, HIGH SENSITIVITY RESULT: 5.2 NG/L — SIGNIFICANT CHANGE UP
WBC # BLD: 6.96 K/UL — SIGNIFICANT CHANGE UP (ref 3.8–10.5)
WBC # FLD AUTO: 6.96 K/UL — SIGNIFICANT CHANGE UP (ref 3.8–10.5)

## 2022-10-19 PROCEDURE — 99285 EMERGENCY DEPT VISIT HI MDM: CPT | Mod: 25

## 2022-10-19 PROCEDURE — 71045 X-RAY EXAM CHEST 1 VIEW: CPT | Mod: 26

## 2022-10-19 PROCEDURE — 93010 ELECTROCARDIOGRAM REPORT: CPT

## 2022-10-19 RX ORDER — MORPHINE SULFATE 50 MG/1
4 CAPSULE, EXTENDED RELEASE ORAL ONCE
Refills: 0 | Status: DISCONTINUED | OUTPATIENT
Start: 2022-10-19 | End: 2022-10-19

## 2022-10-19 RX ORDER — DIAZEPAM 5 MG
5 TABLET ORAL ONCE
Refills: 0 | Status: DISCONTINUED | OUTPATIENT
Start: 2022-10-19 | End: 2022-10-19

## 2022-10-19 RX ORDER — SODIUM CHLORIDE 9 MG/ML
1000 INJECTION INTRAMUSCULAR; INTRAVENOUS; SUBCUTANEOUS ONCE
Refills: 0 | Status: COMPLETED | OUTPATIENT
Start: 2022-10-19 | End: 2022-10-19

## 2022-10-19 RX ORDER — ACETAMINOPHEN 500 MG
650 TABLET ORAL ONCE
Refills: 0 | Status: COMPLETED | OUTPATIENT
Start: 2022-10-19 | End: 2022-10-19

## 2022-10-19 RX ORDER — KETOROLAC TROMETHAMINE 30 MG/ML
15 SYRINGE (ML) INJECTION ONCE
Refills: 0 | Status: DISCONTINUED | OUTPATIENT
Start: 2022-10-19 | End: 2022-10-19

## 2022-10-19 RX ORDER — DEXAMETHASONE 0.5 MG/5ML
10 ELIXIR ORAL ONCE
Refills: 0 | Status: COMPLETED | OUTPATIENT
Start: 2022-10-19 | End: 2022-10-19

## 2022-10-19 RX ORDER — DIAZEPAM 5 MG
10 TABLET ORAL ONCE
Refills: 0 | Status: DISCONTINUED | OUTPATIENT
Start: 2022-10-19 | End: 2022-10-19

## 2022-10-19 RX ADMIN — Medication 5 MILLIGRAM(S): at 17:02

## 2022-10-19 RX ADMIN — SODIUM CHLORIDE 1000 MILLILITER(S): 9 INJECTION INTRAMUSCULAR; INTRAVENOUS; SUBCUTANEOUS at 12:45

## 2022-10-19 RX ADMIN — SODIUM CHLORIDE 2000 MILLILITER(S): 9 INJECTION INTRAMUSCULAR; INTRAVENOUS; SUBCUTANEOUS at 11:47

## 2022-10-19 RX ADMIN — Medication 650 MILLIGRAM(S): at 11:46

## 2022-10-19 RX ADMIN — Medication 10 MILLIGRAM(S): at 11:46

## 2022-10-19 RX ADMIN — Medication 15 MILLIGRAM(S): at 13:02

## 2022-10-19 RX ADMIN — Medication 102 MILLIGRAM(S): at 13:02

## 2022-10-19 RX ADMIN — MORPHINE SULFATE 4 MILLIGRAM(S): 50 CAPSULE, EXTENDED RELEASE ORAL at 14:21

## 2022-10-19 RX ADMIN — MORPHINE SULFATE 4 MILLIGRAM(S): 50 CAPSULE, EXTENDED RELEASE ORAL at 17:40

## 2022-10-19 RX ADMIN — MORPHINE SULFATE 4 MILLIGRAM(S): 50 CAPSULE, EXTENDED RELEASE ORAL at 17:02

## 2022-10-19 NOTE — ED ADULT NURSE NOTE - NS ED NOTE ABUSE RESPONSE YN
PRE-SEDATION ASSESSMENT    CONSENT  Consent for procedure and sedation obtained: Yes    MEDICAL HISTORY  Significant medical/surgical history: Yes  Past Complications with Sedation/Anesthesia: No  Significant Family History: No  Smoking History: No  Alcohol/Drug abuse: No  Possible Pregnancy (LMP): No  Cardiac History: No  Respiratory History: No    PHYSICAL EXAM  History and Physical Reviewed: Patient has valid H&P within 30 days. I have reviewed and there are no changes.  Airway Risk History: No previous complications  Airway Anatomy : Class II  Heart : Normal  Lungs : Normal  LOC/Mental Status : Normal    OTHER FINDINGS  Reviewed current medications and allergies: Yes  Pertinent lab/diagnostic test reviewed: Yes    SEDATION RISK ASSESSMENT  Risk Status ASA: Class III - Severe systemic disease, limits activity, is not incapacitated  Plan for Sedation: Deep Sedation(MAC sedation)  Indications for Procedure/Pre-Procedure Diagnosis and Planned Procedure: Nausea and vomiting, diarrhea  EKG Monitoring: Yes    NARRATIVE FINDINGS      Yes

## 2022-10-19 NOTE — ED PROVIDER NOTE - CLINICAL SUMMARY MEDICAL DECISION MAKING FREE TEXT BOX
Patient presenting with apparent musculoskeletal pain. Will check labs including D-dimer and troponin to exclude underlying process and give trial of pain medications with diazepam and decadron.

## 2022-10-19 NOTE — ED PROVIDER NOTE - OBJECTIVE STATEMENT
58 y/o F presents with left sided upper back pain just medial to her scapula that is associated with pain in the left anterior chest. The pain has been present for over a week. She says it started after she tried to help  her daughter after she had fallen. Both she and her daughter are significantly overweight. She reports she then did heavy lifting afterwards which exacerbated the symptoms. Pain is worse with deep breaths and movements. She is not taking anything for the pain and thinks its musculoskeletal. Denies fever, chills, N/V.

## 2022-10-19 NOTE — ED PROVIDER NOTE - PATIENT PORTAL LINK FT
You can access the FollowMyHealth Patient Portal offered by [facility] by registering at the following website: [email or web address]. By joining [employer]’s FollowMyHealth portal, you will also be able to view your health information using other applications (apps) compatible with our system.

## 2022-10-19 NOTE — ED ADULT NURSE REASSESSMENT NOTE - NS ED NURSE REASSESS COMMENT FT1
Pt c/o pain to left shoulder blade radiating to left chest. MD at bedside. Pt states morphine worked for about 15 mins, but pain is back. MD verbalized understanding. Awaiting new med orders at this time.

## 2022-10-19 NOTE — ED PROVIDER NOTE - PROGRESS NOTE DETAILS
Labs and CXR are all normal, pain had gone away and patient was requesting discharge. Pain has now come back full force. Will give additional 5mg valium as she normally takes 10mg and her body may be used to it, and IM morphine as she has pulled out her IV. Will attempt trigger point injection/injections. Patient is much improved after 3 trigger point injections to be medial and medial to the left scapula.  Patient counseled to follow-up with an osteopath or chiropractor or an acupuncturist.  She already has some Valium at home she will take some tomorrow if he continues and she sometimes gets dislodges via massage program at [facility].

## 2022-10-19 NOTE — ED PROVIDER NOTE - NSFOLLOWUPINSTRUCTIONS_ED_ALL_ED_FT
You appear to have had several trigger points in the region of the left rhomboid muscles.  Please look up gentle stretching exercises for the mid back, consider massage, acupuncture, physical therapy and/or an osteopathic evaluation.    You can also take some of the diazepam that you have at home as a muscle relaxant.    Consider follow up with Dr. Holcomb or Dr. Blair at [facility]. They are osteopathic MDs who can do bone and tissue adjustments as well as set up high quality physical therapy.   [address], [place] [address]  Phone: [phone]      Consider follow up with Dr. Ellis Ramos (local Chiropractor)  [address]  [phone]

## 2022-10-19 NOTE — ED PROVIDER NOTE - CROS ED CARDIOVAS ALL NEG
- - - Referred To Otolaryngology For Closure Text (Leave Blank If You Do Not Want): After obtaining clear surgical margins the patient was sent to otolaryngology for surgical repair.  The patient understands they will receive post-surgical care and follow-up from the referring physician's office.

## 2022-10-19 NOTE — ED PROVIDER NOTE - MUSCULOSKELETAL, MLM
+Tender point/trigger point at the medial lower border of the left scapula. No acute deformities noted to extremities.

## 2022-10-20 RX ORDER — DIAZEPAM 5 MG
1 TABLET ORAL
Qty: 6 | Refills: 0
Start: 2022-10-20 | End: 2022-10-22

## 2022-10-20 NOTE — ED POST DISCHARGE NOTE - REASON FOR FOLLOW-UP
Other pt called and states she does not have any valium at home, thought that she did so Dr Junior did not prescribe any yesterday; rx for 3 days sent

## 2022-10-21 DIAGNOSIS — Z86.73 PERSONAL HISTORY OF TRANSIENT ISCHEMIC ATTACK (TIA), AND CEREBRAL INFARCTION WITHOUT RESIDUAL DEFICITS: ICD-10-CM

## 2022-10-21 DIAGNOSIS — M54.6 PAIN IN THORACIC SPINE: ICD-10-CM

## 2022-10-21 DIAGNOSIS — Z91.018 ALLERGY TO OTHER FOODS: ICD-10-CM

## 2022-10-21 DIAGNOSIS — E78.5 HYPERLIPIDEMIA, UNSPECIFIED: ICD-10-CM

## 2022-10-21 DIAGNOSIS — F41.9 ANXIETY DISORDER, UNSPECIFIED: ICD-10-CM

## 2022-10-21 DIAGNOSIS — J44.9 CHRONIC OBSTRUCTIVE PULMONARY DISEASE, UNSPECIFIED: ICD-10-CM

## 2022-10-21 DIAGNOSIS — I10 ESSENTIAL (PRIMARY) HYPERTENSION: ICD-10-CM

## 2022-10-21 DIAGNOSIS — Z88.1 ALLERGY STATUS TO OTHER ANTIBIOTIC AGENTS STATUS: ICD-10-CM

## 2022-10-21 DIAGNOSIS — Z87.19 PERSONAL HISTORY OF OTHER DISEASES OF THE DIGESTIVE SYSTEM: ICD-10-CM

## 2022-10-21 DIAGNOSIS — R07.89 OTHER CHEST PAIN: ICD-10-CM

## 2022-10-21 DIAGNOSIS — Z88.5 ALLERGY STATUS TO NARCOTIC AGENT: ICD-10-CM

## 2022-10-21 DIAGNOSIS — I25.2 OLD MYOCARDIAL INFARCTION: ICD-10-CM

## 2022-10-21 DIAGNOSIS — M19.90 UNSPECIFIED OSTEOARTHRITIS, UNSPECIFIED SITE: ICD-10-CM

## 2022-12-26 ENCOUNTER — EMERGENCY (EMERGENCY)
Facility: HOSPITAL | Age: 57
LOS: 1 days | Discharge: HOME | End: 2022-12-26
Attending: EMERGENCY MEDICINE | Admitting: EMERGENCY MEDICINE
Payer: MEDICARE

## 2022-12-26 VITALS
SYSTOLIC BLOOD PRESSURE: 157 MMHG | HEART RATE: 86 BPM | HEIGHT: 61 IN | TEMPERATURE: 98 F | OXYGEN SATURATION: 97 % | RESPIRATION RATE: 16 BRPM | DIASTOLIC BLOOD PRESSURE: 82 MMHG | WEIGHT: 179.02 LBS

## 2022-12-26 DIAGNOSIS — K64.8 OTHER HEMORRHOIDS: Chronic | ICD-10-CM

## 2022-12-26 PROCEDURE — 73562 X-RAY EXAM OF KNEE 3: CPT | Mod: 26,RT

## 2022-12-26 PROCEDURE — 73502 X-RAY EXAM HIP UNI 2-3 VIEWS: CPT | Mod: 26,RT

## 2022-12-26 PROCEDURE — 73130 X-RAY EXAM OF HAND: CPT | Mod: 26,RT

## 2022-12-26 PROCEDURE — 99284 EMERGENCY DEPT VISIT MOD MDM: CPT | Mod: 25

## 2022-12-26 NOTE — ED PROVIDER NOTE - PATIENT PORTAL LINK FT
You can access the FollowMyHealth Patient Portal offered by [facility] by registering at the following website: [email or web address]. By joining [employer]’s FollowMyHealth portal, you will also be able to view your health information using other applications (apps) compatible with our system.

## 2022-12-26 NOTE — ED PROVIDER NOTE - PHYSICAL EXAMINATION
Gen: Well-developed, well-nourished, NAD, VS as noted by nursing. HEENT: NCAT, mmm   Chest: RRR, nl S1 and S2, no m/r/g. Resp: CTAB, no w/r/r  Abd: nl BS, soft, nt/nd. Ext: Warm, dry. Right leg: mild tenderness to right hip. moderate tenderness to right lateral knee with small effusion. Able to   Neuro: CN II-XII intact, normal and equal strength, sensation, and reflexes bilaterally, normal gait  Psych: AAOx3

## 2022-12-26 NOTE — ED ADULT NURSE NOTE - SUICIDE SCREENING QUESTION 3
REceived refill request from patient's pharmacy for fluocinolone oil. Pt was last seen by Dr. Bennett in March 2017, no follow up currently scheduled. Order pended to Dr. Bennett for review.  
No

## 2022-12-26 NOTE — ED PROVIDER NOTE - NSFOLLOWUPINSTRUCTIONS_ED_ALL_ED_FT
Finger Sprain, Adult      A finger sprain is a tear or stretch in a ligament in a finger. Ligaments are tissues that connect bones to each other.      What are the causes?    Finger sprains happen when something makes the bones in the hand move in an abnormal way. They are often caused by a fall or an accident.      What increases the risk?    This condition is more likely to develop in people who:  •Participate in sports in which it is easy to fall, such as skiing.      •Play sports that involve catching an object, such as basketball.      •Have poor strength and flexibility.        What are the signs or symptoms?    Symptoms of this condition include:  •Pain or tenderness in the finger.      •Swelling in the finger.      •A bluish appearance to the finger.      •Bruising.      •Difficulty bending and flexing the finger.        How is this diagnosed?    This condition is diagnosed with an exam of your finger. Your health care provider may take an X-ray to see if any bones are broken or dislocated.      How is this treated?  Hand showing finger splint on index and middle fingers and wrist.   Treatment for this condition depends on how severe the sprain is. It may involve:  •Preventing the finger from moving for a period of time. Your finger may be wrapped in a bandage (dressing) or splint, or your finger may be taped to the fingers beside it (joey taping).      •Medicines for pain.      •Exercises to strengthen the finger. These may be recommended when the finger has healed.      •Surgery to reconnect the ligament to a bone. This may be done if the ligament was completely torn.        Follow these instructions at home:    If you have a removable splint:     •Wear the splint as told by your health care provider. Remove it only as told by your health care provider.      •Check the skin around the splint every day. Tell your health care provider about any concerns.      •Loosen the splint if your fingers tingle, become numb, or turn cold and blue.      •Keep the splint clean.    •If the splint is not waterproof:  •Do not let it get wet.      •Cover it with a watertight covering when you take a bath or shower.        Managing pain, stiffness, and swelling   •If directed, put ice on the injured area. To do this:  •If you have a removable splint, remove it as told by your health care provider.      •Put ice in a plastic bag.      •Place a towel between your skin and the bag.      •Leave the ice on for 20 minutes, 2–3 times a day.      •Remove the ice if your skin turns bright red. This is very important. If you cannot feel pain, heat, or cold, you have a greater risk of damage to the area.        •Move your fingers often to reduce stiffness and swelling.      •Raise (elevate) the injured area above the level of your heart while you are sitting or lying down.      Medicines     •Take over-the-counter and prescription medicines only as told by your health care provider.      •Ask your health care provider if the medicine prescribed to you requires you to avoid driving or using machinery.      General instructions     •Keep any dressings dry until your health care provider says they can be removed.      •If your fingers are joey taped, replace your joey taping as told by your health care provider.      •Do exercises as told by your health care provider or physical therapist.      • Do not wear rings on your injured finger.      •Keep all follow-up visits. This is important.        Contact a health care provider if:    •Your pain is not controlled with medicine.      •Your bruising or swelling gets worse.      •Your splint is damaged.      •You develop a fever.        Get help right away if:    •Your finger is numb or blue.      •Your finger feels colder to the touch than normal.        Summary    •A finger sprain is a tear or stretch in a ligament in a finger. Ligaments are tissues that connect bones to each other.      •Finger sprains happen when something makes the bones in the hand move in an abnormal way. They are often caused by a fall or accident.      •This condition is diagnosed with an exam of your finger. Your health care provider may do an X-ray to see if any bones are broken or dislocated.      •Treatment for this condition depends on how severe the sprain is. Treatment may involve joey taping or wearing a splint. Surgery to reconnect the ligament to a bone may be needed if the ligament was torn all the way.      This information is not intended to replace advice given to you by your health care provider. Make sure you discuss any questions you have with your health care provider.      Document Revised: 11/10/2021 Document Reviewed: 11/10/2021    [employer] Patient Education © 2022 [employer]         Knee Sprain    WHAT YOU NEED TO KNOW:    A knee sprain is a stretched or torn ligament in your knee. Ligaments support the knee and keep the joint and bones in the correct position. A knee sprain may involve one or more ligaments.  Knee Anatomy          DISCHARGE INSTRUCTIONS:    Return to the emergency department if:   •Any part of your leg feels cold, numb, or looks pale.          Call your doctor if:   •You have new or increased swelling, bruising, or pain in your knee.      •Your symptoms do not improve within 6 weeks, even with treatment.      •You have questions or concerns about your condition or care.      Medicines:   •NSAIDs, such as ibuprofen, help decrease swelling, pain, and fever. This medicine is available with or without a doctor's order. NSAIDs can cause stomach bleeding or kidney problems in certain people. If you take blood thinner medicine, always ask your healthcare provider if NSAIDs are safe for you. Always read the medicine label and follow directions.      •Acetaminophen decreases pain and fever. It is available without a doctor's order. Ask how much to take and how often to take it. Follow directions. Read the labels of all other medicines you are using to see if they also contain acetaminophen, or ask your doctor or pharmacist. Acetaminophen can cause liver damage if not taken correctly.      •Prescription pain medicine may be given. Ask your healthcare provider how to take this medicine safely. Some prescription pain medicines contain acetaminophen. Do not take other medicines that contain acetaminophen without talking to your healthcare provider. Too much acetaminophen may cause liver damage. Prescription pain medicine may cause constipation. Ask your healthcare provider how to prevent or treat constipation.       •Take your medicine as directed. Contact your healthcare provider if you think your medicine is not helping or if you have side effects. Tell your provider if you are allergic to any medicine. Keep a list of the medicines, vitamins, and herbs you take. Include the amounts, and when and why you take them. Bring the list or the pill bottles to follow-up visits. Carry your medicine list with you in case of an emergency.      A support device such as a splint or brace may be needed. These devices limit movement and protect the joint while it heals. You may be given crutches to use until you can stand on your injured leg without pain.    Physical therapy may be needed. A physical therapist teaches you exercises to help improve movement and strength, and to decrease pain.    Manage a knee sprain:   •Rest your knee and do not exercise. Do not walk on your injured leg if you are told to keep weight off your knee. Rest helps decrease swelling and allows the injury to heal. You can do gentle range of motion exercises as directed to prevent stiffness.      •Apply ice on your knee for 15 to 20 minutes every hour or as directed. Use an ice pack, or put crushed ice in a plastic bag. Cover the bag with a towel before you apply it. Ice helps prevent tissue damage and decreases swelling and pain.      •Apply compression to your knee as directed. You may need to wear an elastic bandage. This helps keep your injured knee from moving too much while it heals. It should be tight enough to give support but so tight that it causes your toes to feel numb or tingly. Take the bandage off and rewrap it at least 1 time each day.  How to Wrap an Elastic Bandage           •Elevate your knee above the level of your heart as often as you can. This will help decrease swelling and pain. Prop your leg on pillows or blankets to keep it elevated comfortably. Do not put pillows directly behind your knee.  Elevate Leg           Prevent another knee sprain: Exercise your legs to keep your muscles strong. Strong leg muscles help protect your knee and prevent strain. The following may also prevent a knee sprain:   •Slowly start your exercise or training program. Slowly increase the time, distance, and intensity of your exercise. Sudden increases in training may cause another knee sprain.      •Wear protective braces and equipment as directed. Braces may prevent your knee from moving the wrong way and causing another sprain. Protective equipment may support your bones and ligaments to prevent injury.      •Warm up and stretch before exercise. Warm up by walking or using an exercise bike before starting your regular exercise. Do gentle stretches after warming up. This helps to loosen your muscles and decrease stress on your knee. Cool down and stretch after you exercise.  Warm up and Cool Down            •Wear shoes that fit correctly and support your feet. Replace your running or exercise shoes before the padding or shock absorption is worn out. Ask your healthcare provider which exercise shoes are best for you. Ask if you should wear shoe inserts. Shoe inserts can help support your heels and arches or keep your foot lined up correctly in your shoes. Exercise on flat surfaces.      Follow up with your doctor as directed: Write down your questions so you remember to ask them during your visits.       © Copyright [employer] 2022           back to top                          © Copyright [employer] 2022

## 2022-12-26 NOTE — ED PROVIDER NOTE - OBJECTIVE STATEMENT
Patient reports her right leg gave out about 2 hours prior to arrival. THe leg twisted under her and when her right hand hit the ground, her right 5th finger was forcibly abducted. No head injury. c/o pain in hand and knee. mild pain in right hip when she tries to bend knee. No cp, sob, focal weakness, paresthesias.

## 2022-12-26 NOTE — ED ADULT TRIAGE NOTE - CHIEF COMPLAINT QUOTE
female patient here for eval of mechanical fall x 2 hrs ago. patient c.o right knee pain and right hand pain patient states she was walking across the street when her right leg "gave away" under her causing her to fall and land on her right hand. patient stated she may have hyperextended her 5th digit. patient is unable to move affected digit as it is too painful to do so. patient denies head strike. no open wounds noted upon assessment.

## 2022-12-29 DIAGNOSIS — I10 ESSENTIAL (PRIMARY) HYPERTENSION: ICD-10-CM
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
## 2022-12-29 DIAGNOSIS — I25.2 OLD MYOCARDIAL INFARCTION: ICD-10-CM

## 2022-12-29 DIAGNOSIS — Z88.1 ALLERGY STATUS TO OTHER ANTIBIOTIC AGENTS STATUS: ICD-10-CM

## 2022-12-29 DIAGNOSIS — Z79.82 LONG TERM (CURRENT) USE OF ASPIRIN: ICD-10-CM

## 2022-12-29 DIAGNOSIS — Z91.018 ALLERGY TO OTHER FOODS: ICD-10-CM

## 2022-12-29 DIAGNOSIS — I25.10 ATHEROSCLEROTIC HEART DISEASE OF NATIVE CORONARY ARTERY WITHOUT ANGINA PECTORIS: ICD-10-CM

## 2022-12-29 DIAGNOSIS — Z87.19 PERSONAL HISTORY OF OTHER DISEASES OF THE DIGESTIVE SYSTEM: ICD-10-CM
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
## 2022-12-29 DIAGNOSIS — S63.611A UNSPECIFIED SPRAIN OF LEFT INDEX FINGER, INITIAL ENCOUNTER: ICD-10-CM

## 2022-12-29 DIAGNOSIS — M19.90 UNSPECIFIED OSTEOARTHRITIS, UNSPECIFIED SITE: ICD-10-CM
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
## 2022-12-29 DIAGNOSIS — F41.9 ANXIETY DISORDER, UNSPECIFIED: ICD-10-CM
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
## 2022-12-29 DIAGNOSIS — Z86.73 PERSONAL HISTORY OF TRANSIENT ISCHEMIC ATTACK (TIA), AND CEREBRAL INFARCTION WITHOUT RESIDUAL DEFICITS: ICD-10-CM

## 2022-12-29 DIAGNOSIS — Z88.5 ALLERGY STATUS TO NARCOTIC AGENT: ICD-10-CM

## 2022-12-29 DIAGNOSIS — S83.91XA SPRAIN OF UNSPECIFIED SITE OF RIGHT KNEE, INITIAL ENCOUNTER: ICD-10-CM

## 2022-12-29 DIAGNOSIS — J44.9 CHRONIC OBSTRUCTIVE PULMONARY DISEASE, UNSPECIFIED: ICD-10-CM

## 2022-12-29 DIAGNOSIS — M25.561 PAIN IN RIGHT KNEE: ICD-10-CM

## 2022-12-29 DIAGNOSIS — F32.A DEPRESSION, UNSPECIFIED: ICD-10-CM

## 2022-12-29 DIAGNOSIS — E78.5 HYPERLIPIDEMIA, UNSPECIFIED: ICD-10-CM

## 2023-04-28 ENCOUNTER — EMERGENCY (EMERGENCY)
Facility: HOSPITAL | Age: 58
LOS: 1 days | Discharge: HOME | End: 2023-04-28
Attending: EMERGENCY MEDICINE | Admitting: EMERGENCY MEDICINE
Payer: MEDICARE

## 2023-04-28 VITALS
RESPIRATION RATE: 18 BRPM | TEMPERATURE: 98 F | OXYGEN SATURATION: 96 % | DIASTOLIC BLOOD PRESSURE: 95 MMHG | HEART RATE: 118 BPM | HEIGHT: 61 IN | WEIGHT: 184.97 LBS | SYSTOLIC BLOOD PRESSURE: 148 MMHG

## 2023-04-28 VITALS — HEART RATE: 109 BPM

## 2023-04-28 DIAGNOSIS — K64.8 OTHER HEMORRHOIDS: Chronic | ICD-10-CM

## 2023-04-28 PROCEDURE — 99284 EMERGENCY DEPT VISIT MOD MDM: CPT

## 2023-04-28 RX ORDER — ACETAMINOPHEN 500 MG
650 TABLET ORAL ONCE
Refills: 0 | Status: COMPLETED | OUTPATIENT
Start: 2023-04-28 | End: 2023-04-28

## 2023-04-28 RX ORDER — DIPHENHYDRAMINE HCL 50 MG
1 CAPSULE ORAL
Qty: 15 | Refills: 0
Start: 2023-04-28 | End: 2023-05-02

## 2023-04-28 RX ORDER — DIPHENHYDRAMINE HCL 50 MG
25 CAPSULE ORAL ONCE
Refills: 0 | Status: COMPLETED | OUTPATIENT
Start: 2023-04-28 | End: 2023-04-28

## 2023-04-28 RX ADMIN — Medication 25 MILLIGRAM(S): at 16:35

## 2023-04-28 RX ADMIN — Medication 50 MILLIGRAM(S): at 16:35

## 2023-04-28 NOTE — ED PROVIDER NOTE - NSFOLLOWUPINSTRUCTIONS_ED_ALL_ED_FT
What are the risks of the shingles vaccine? You may develop a rash that looks like chickenpox near the injection site. The site may also be red, sore, swollen, or itch. You may get shingles even after you receive the vaccine. You may have an allergic reaction to one or both doses of the vaccine. Rarely, this can be life-threatening.    Call your local emergency number (911 in the [place]) if:    You have signs of a severe allergic reaction, such as trouble breathing, swelling in your throat, or hives.    When should I seek immediate care?    You have a high fever or any symptoms that concern you.

## 2023-04-28 NOTE — ED PROVIDER NOTE - PATIENT PORTAL LINK FT
You can access the FollowMyHealth Patient Portal offered by [facility] by registering at the following website: [email or web address]. By joining [employer]’s FollowMyHealth portal, you will also be able to view your health information using other applications (apps) compatible with our system.

## 2023-04-28 NOTE — ED ADULT NURSE NOTE - OBJECTIVE STATEMENT
Patient presents with complaints of body ache, generalized weakness/fatigue, left arm pain, and back pain after getting the shingle vaccine on Wednesday. denies fever, chills, SOB, CP, dizziness, nausea, vomiting, diarrhea. Alert and oriented x 3. Ambulatory with cane.

## 2023-04-28 NOTE — ED ADULT TRIAGE NOTE - CHIEF COMPLAINT QUOTE
Pt walked in c/o of left arm pain, bilateral lower back and leg pain x yesterday morning. Pt reports the pain started after getting the Shingles vaccine on Wednesday. Pt arrives tachycardic.

## 2023-04-28 NOTE — ED PROVIDER NOTE - PHYSICAL EXAMINATION
VITAL SIGNS: I have reviewed nursing notes and confirm.  CONSTITUTIONAL: Well-developed; well-nourished; in no acute distress.  SKIN: Skin exam is warm and dry, + faint blanching erythema throughout limbs and chest, + discrete 4x4cxm area of warm tender erythema at injection site L deltoid w/out fluctuance or bleeding/discharge  HEAD: Normocephalic; atraumatic.  EYES: PERRL, EOM intact; conjunctiva and sclera clear.  ENT: No nasal discharge; airway clear.  NECK: Supple; non tender.  CARD: RRR  RESP: Unlabored. CTA b/l  ABD: soft; non-distended; non-tender  EXT: Normal ROM. No cyanosis or edema. Non-ttp all ext, distal pulses intact  LYMPH: No acute cervical adenopathy.  NEURO: Alert, oriented. Grossly unremarkable.  PSYCH: Cooperative, appropriate.

## 2023-04-28 NOTE — ED PROVIDER NOTE - CLINICAL SUMMARY MEDICAL DECISION MAKING FREE TEXT BOX
Mild allergic reaction (possibly just an expected reaction) from shingles vaccine. Treating with steroids/benadryl. Erythemaous area on deltoid marked. Instructed pt to return to ED should redness continue to spread despite steroid/antihistamine cocktail.

## 2023-05-01 DIAGNOSIS — L53.9 ERYTHEMATOUS CONDITION, UNSPECIFIED: ICD-10-CM

## 2023-05-01 DIAGNOSIS — T80.62XA OTHER SERUM REACTION DUE TO VACCINATION, INITIAL ENCOUNTER: ICD-10-CM

## 2023-05-01 DIAGNOSIS — Z88.1 ALLERGY STATUS TO OTHER ANTIBIOTIC AGENTS STATUS: ICD-10-CM

## 2023-05-01 DIAGNOSIS — Z88.5 ALLERGY STATUS TO NARCOTIC AGENT: ICD-10-CM

## 2023-05-01 DIAGNOSIS — Z79.82 LONG TERM (CURRENT) USE OF ASPIRIN: ICD-10-CM
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
## 2023-05-01 DIAGNOSIS — Z91.018 ALLERGY TO OTHER FOODS: ICD-10-CM

## 2023-05-01 DIAGNOSIS — R53.83 OTHER FATIGUE: ICD-10-CM

## 2023-05-01 DIAGNOSIS — E78.5 HYPERLIPIDEMIA, UNSPECIFIED: ICD-10-CM

## 2023-05-01 DIAGNOSIS — T50.B95A ADVERSE EFFECT OF OTHER VIRAL VACCINES, INITIAL ENCOUNTER: ICD-10-CM

## 2023-05-01 DIAGNOSIS — I25.10 ATHEROSCLEROTIC HEART DISEASE OF NATIVE CORONARY ARTERY WITHOUT ANGINA PECTORIS: ICD-10-CM
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
## 2023-05-01 DIAGNOSIS — I10 ESSENTIAL (PRIMARY) HYPERTENSION: ICD-10-CM

## 2023-05-01 DIAGNOSIS — M79.10 MYALGIA, UNSPECIFIED SITE: ICD-10-CM

## 2023-06-15 NOTE — ED PROVIDER NOTE - IV ALTEPLASE EXCL REL HIDDEN
What Type Of Note Output Would You Prefer (Optional)?: Standard Output Hpi Title: Evaluation of Skin Lesions How Severe Are Your Spot(S)?: mild Have Your Spot(S) Been Treated In The Past?: has not been treated Family Member: Father & grandmother Year Removed: 1900 show
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
## 2024-03-19 NOTE — PATIENT PROFILE ADULT. - FUNCTIONAL SCREEN CURRENT LEVEL: COMMUNICATION, MLM
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 We have been consulted for hematuria. History obtained from chart as the patient is confused and a poor historian. Noncontrast CT a/p 3/18/24 shows right ureteral stent in good position, no hydronephrosis, nonobstructing renal stones bilaterally, bladder decompressed. He had his chronic dawson catheter exchanged in ED.     HISTORIES:      ALLERGIES:  No Known Allergies    Current Facility-Administered Medications   Medication Dose Route Frequency Provider Last Rate Last Admin    VANCOMYCIN - PHARMACIST MONITORED Misc   Does not apply See Admin Instructions Katerin Call CNP        ceFEPIme (MAXIPIME) 2,000 mg in sodium chloride 0.9 % 100 mL IVPB  2,000 mg Intravenous 3 times per day Tosin Rubio MD 25 mL/hr at 03/19/24 1325 2,000 mg at 03/19/24 1325    baclofen (LIORESAL) tablet 20 mg  20 mg Oral BID Cate Cola, CNP   20 mg at 03/19/24 1055    vancomycin (VANCOCIN) 1,000 mg in sodium chloride 0.9 % 250 mL IVPB  1,000 mg Intravenous Q12H Katerin Call .7 mL/hr at 03/19/24 0213 1,000 mg at 03/19/24 5827    levothyroxine (SYNTHROID, LEVOTHROID) tablet 200 mcg  200 mcg Oral QAM AC Abner James CNP   200 mcg at 03/19/24 7443    And    levothyroxine (SYNTHROID, LEVOTHROID) tablet 25 mcg  25 mcg Oral QAM AC Abner James CNP   25 mcg at 03/19/24 0725    pantoprazole (PROTONIX INJECT) injection 40 mg  40 mg Intravenous 2 times per day Narendra Tobin MD   40 mg at 03/19/24 1056    sodium chloride 0.9 % flush bag 25 mL  25 mL Intravenous PRN Lidia Smith CNP 25 mL/hr at 03/19/24 0600 25 mL at 03/19/24 0600    sodium chloride 0.9 % injection 2 mL  2 mL Intracatheter 2 times per day Lidia Smith CNP   2 mL at 03/19/24 1056    acetaminophen (TYLENOL) tablet 650 mg  650 mg Oral Q4H PRN Lidia Smith CNP        Or    acetaminophen (TYLENOL) suppository 650 mg  650 mg Rectal Q4H PRN Lidia Smith CNP        docusate sodium-sennosides (SENOKOT S) 50-8.6 MG 2 tablet  2 tablet Oral BID PRN Lidia Smith CNP        bisacodyl (DULCOLAX) suppository 10 mg  10 mg Rectal Daily PRN Lidia Smith CNP        magnesium hydroxide (MILK OF MAGNESIA) 400 MG/5ML suspension 30 mL  30 mL Oral Daily PRN Lidia Smith CNP        acetylcysteine (MUCOMYST) 20 % nebulizer solution 4 mL  4 mL Nebulization BID Lidia Smith CNP   4 mL at 03/19/24 0735    apixaBAN (ELIQUIS) tablet 5 mg  5 mg Oral 2 times per day Lidia Smith CNP   5 mg at 03/19/24 1055    fluticasone (FLONASE) 50 MCG/ACT nasal spray 1 spray  1 spray Each Nare Daily PRN Lidia Smith CNP        ipratropium-albuterol (DUONEB) 0.5-2.5 (3) MG/3ML nebulizer solution 3 mL  3 mL Nebulization TID Fernando GARCIAS CNP   3 mL at 03/19/24 0735    melatonin tablet 3 mg  3 mg Oral QHS PRN Lidia Smith CNP        montelukast (SINGULAIR) tablet 10 mg  10 mg Oral Daily Lidia Smith CNP   10 mg at 03/19/24 1055    oxyCODONE (IMM REL) (ROXICODONE) tablet 5 mg  5 mg Oral Q6H PRN Lidia Smith CNP        HYDROcodone-acetaminophen (NORCO)  MG per tablet 2 tablet  2 tablet Oral Q6H PRN Lidia Smith CNP   2 tablet at 03/17/24 "2344    senna (SENOKOT) 17.2 mg  2 tablet Oral Daily PRN Raliegh Brand, CNP        dextrose 5 % / sodium chloride 0.9% infusion   Intravenous Continuous Raliegh Brand, CNP 50 mL/hr at 03/18/24 2157 New Bag at 03/18/24 2157    dextrose 50 % injection 25 g  25 g Intravenous PRN Raliegh Brand, CNP        dextrose 50 % injection 12.5 g  12.5 g Intravenous PRN Raliegh Brand, CNP        glucagon (GLUCAGEN) injection 1 mg  1 mg Intramuscular PRN Kassandra Fillers E, CNP        dextrose (GLUTOSE) 40 % gel 15 g  15 g Oral PRN Kassandra Fillers E, CNP        dextrose (GLUTOSE) 40 % gel 30 g  30 g Oral PRN Raliegh Brand, CNP           History reviewed. No pertinent past medical history. History reviewed. No pertinent surgical history. History reviewed. No pertinent family history.     Social History     Tobacco Use   Smoking Status Never   Smokeless Tobacco Never     Social History     Substance and Sexual Activity   Alcohol Use Not Currently       REVIEW OF SYSTEMS:     Review of Systems   Unable to perform ROS: Mental status change          OBJECTIVE:      VITAL SIGNS:    Vital Last Value 24 Hour Range   Temperature 97.5 Â°F (36.4 Â°C) (03/19/24 1244) Temp  Min: 96.3 Â°F (35.7 Â°C)  Max: 98.2 Â°F (36.8 Â°C)   Pulse 80 (03/19/24 1244) Pulse  Min: 77  Max: 82   Respiratory 18 (03/19/24 1244) Resp  Min: 14  Max: 18   Non-Invasive  Blood Pressure 128/67 (03/19/24 1244) BP  Min: 119/46  Max: 151/69   Pulse Oximetry 98 % (03/19/24 1244) SpO2  Min: 97 %  Max: 100 %     Vital Today Admitted   Weight 132.8 kg (292 lb 12.3 oz) (03/19/24 0517) Weight: 134.4 kg (296 lb 4.8 oz) (03/17/24 1317)   Height N/A Height: 5' 9"" (175.3 cm) (03/17/24 1317)   Body Mass Index N/A BMI (Calculated): 43.76 (03/17/24 1317)     INTAKE/OUTPUT:      Intake/Output Summary (Last 24 hours) at 3/19/2024 1339  Last data filed at 3/19/2024 1256  Gross per 24 hour   Intake 228.61 ml   Output 765 ml   Net -536.39 ml        PE    General:  Alert and oriented x2, " No acute distress. Lethargic. Eye: Extraocular movements are intact. HENT: Normocephalic, atraumatic. Neck: Supple. Respiratory: Non-labored respirations. Gastrointestinal:  Soft, Non-tender, Non-distended. Morbidly obese. Ostomy bag with brown stool. Genitourinary:  Buried penis with traumatic hypospadias. 16 Fr dawson catheter draining grade 1 hematuria, no clots. Integumentary:  Warm, Dry. Neurologic:  Alert, Oriented. Musculoskeletal: No deformities. Psychiatric:  Cooperative, Appropriate mood & affect. LABORATORY DATA:    Lab Results   Component Value Date    SODIUM 138 03/19/2024    POTASSIUM 3.5 03/19/2024    CHLORIDE 104 03/19/2024    CO2 28 03/19/2024    BUN 59 (H) 03/19/2024    CREATININE 1.40 (H) 03/19/2024    GLUCOSE 122 (H) 03/19/2024    WBC 20.7 (H) 03/19/2024    HCT 25.2 (L) 03/19/2024    HGB 7.6 (L) 03/19/2024     03/19/2024     (H) 03/19/2024    GPT 53 03/19/2024    ALKPT 412 (H) 03/19/2024    BILIRUBIN 0.6 03/19/2024      INR (no units)   Date Value   12/15/2023 1.0      Lab Results   Component Value Date    COL Orange 03/17/2024    UAPP Turbid 03/17/2024    USPG 1.011 03/17/2024    UPH 7.5 (H) 03/17/2024    UPROT 100 (A) 03/17/2024    UGLU Negative 03/17/2024    UKET Negative 03/17/2024    UBILI Negative 03/17/2024    URBC Large (A) 03/17/2024    UNITR Negative 03/17/2024    UROB 0.2 03/17/2024    UWBC Large (A) 03/17/2024      Microbiology Results  (Last 10 results in the past 7 days)      Specimen   Gram Smear   Culture Result   Status       03/17/24  1253         Gram positive cocci in clusters. Comment: Detected from anaerobic bottle after    21 hours. [P]                   [P] - Preliminary Result                IMAGING STUDIES:    Narrative & Impression   EXAM/TECHNIQUE: CT ABDOMEN PELVIS WO CONTRAST. Adjustment of the mA and/or  kV was done according to patient's size. CLINICAL INDICATION: Urinary tract infection. COMPARISON: 12/15/2023. FINDINGS: Right kidney has normal morphology with resolution of the  previous swelling. There is an internal ureteral stent with the tip in the  upper pole region. Calcifications in the lower pole of the kidney are noted  again. There is no hydronephrosis and there is no perirenal stranding. Left  kidney is atrophic/hypoplastic and unchanged. There is no hydroureter. Bladder is essentially empty. Large ventral hernia with a wide neck containing colon, small bowel and  fat/mesentery/omentum is noted again. There is no edema. There is no bowel  obstruction. There is fatty infiltration/stranding along the anterior aspect of the  third portion of the duodenum and the posterior inferior aspect of the  pancreas uncinate process. Pancreas itself is not swollen or thickened. There is infiltration/stranding extends into the right anterior pararenal  space. There is no focal fluid collection. Liver and spleen are normal. Gallbladder is not distended. There are  surgical staples between the descending duodenum and the adjacent  pancreatic head. Abdominal aorta has normal size. No para-aortic  lymphadenopathy. There is severe atrophy of the intra and the extra abdominal muscles  including the paraspinal muscles. In the lower posterior lungs there is some atelectasis more on the left. IMPRESSION:  1. Inflammatory changes in the region of the third portion of the duodenum  and adjacent to the pancreas uncinate process with extension into the right  anterior pararenal space. This may imply duodenitis/duodenal ulcer as the  pancreatic uncinate process is still present not appear to be thickened  2. Previously seen swelling of the right kidney has resolved. An internal  ureteral stent is present. Calcifications in its lower pole are  nonobstructing. Electronically Signed by: Jaspal Guido M.D.    Signed on: 3/18/2024 10:25 AM   Workstation ID: [email or web address]       ASSESSMENT & PLAN: 62 yo M with multiple medical problems including paraplegia, neurogenic bladder managed with dawson catheter, nephrolithiasis s/p recent staged PCNL at [facility], and recent cystoscopy clot evac and right ureteral stent placed 12/15/24, now admitted with sepsis, MRSA bacteremia, Proteus UTI. Seen for hematuria. - CT reviewed. Right ureteral stent in good position. No hydronephrosis bilaterally. Nonobstructing renal stones bilaterally. Dawson catheter appears malpositioned. - On exam, dawson catheter was repositioned and balloon reinflated with 10 cc sterile water. Dawson catheter irrigated without difficulty with return of clear output and 1 small clots. No need for CBI or further intervention. May manually irrigate dawson prn dark red or decreased UOP, clots, or bladder spasms.   - No need to hold Eliquis. - Follow up with his urologists at [facility] for further stent and stone management. Reviewed with patient. Discussed with  attending, Dr. Molly Nolasco. Thank you for allowing us to participate in the care of Jewel Mccarthy.      Yamilet Jameson PA-C  [facility]  Perfectserve or  (0) understands/communicates without difficulty

## 2024-04-05 ENCOUNTER — EMERGENCY (EMERGENCY)
Facility: HOSPITAL | Age: 59
LOS: 1 days | Discharge: HOME | End: 2024-04-05
Admitting: EMERGENCY MEDICINE
Payer: MEDICARE

## 2024-04-05 VITALS
HEIGHT: 61 IN | RESPIRATION RATE: 16 BRPM | TEMPERATURE: 97 F | OXYGEN SATURATION: 100 % | DIASTOLIC BLOOD PRESSURE: 103 MMHG | SYSTOLIC BLOOD PRESSURE: 141 MMHG | HEART RATE: 101 BPM | WEIGHT: 175.05 LBS

## 2024-04-05 DIAGNOSIS — K64.8 OTHER HEMORRHOIDS: Chronic | ICD-10-CM

## 2024-04-05 PROCEDURE — 99283 EMERGENCY DEPT VISIT LOW MDM: CPT

## 2024-04-05 RX ORDER — ACETAMINOPHEN 500 MG
975 TABLET ORAL ONCE
Refills: 0 | Status: COMPLETED | OUTPATIENT
Start: 2024-04-05 | End: 2024-04-05

## 2024-04-05 RX ORDER — PERMETHRIN CREAM 5% W/W 50 MG/G
1 CREAM TOPICAL ONCE
Refills: 0 | Status: COMPLETED | OUTPATIENT
Start: 2024-04-05 | End: 2024-04-05

## 2024-04-05 RX ORDER — IBUPROFEN 200 MG
1 TABLET ORAL
Qty: 28 | Refills: 0
Start: 2024-04-05 | End: 2024-04-11

## 2024-04-05 RX ORDER — IBUPROFEN 200 MG
800 TABLET ORAL ONCE
Refills: 0 | Status: COMPLETED | OUTPATIENT
Start: 2024-04-05 | End: 2024-04-05

## 2024-04-05 RX ADMIN — Medication 975 MILLIGRAM(S): at 09:17

## 2024-04-05 RX ADMIN — Medication 100 MILLIGRAM(S): at 09:17

## 2024-04-05 RX ADMIN — Medication 800 MILLIGRAM(S): at 09:17

## 2024-04-05 RX ADMIN — PERMETHRIN CREAM 5% W/W 1 APPLICATION(S): 50 CREAM TOPICAL at 09:17

## 2024-04-05 NOTE — ED PROVIDER NOTE - PHYSICAL EXAMINATION
CONSTITUTIONAL: Well-appearing; well-nourished; in no apparent distress.   	HEAD: Normocephalic; atraumatic.   	EXT: BAILON x 4. normal gait  LUE: superficial wound to left AC, quarter sized erythema with mild tenderness, no swelling or induration. no streaking. fb. no crepitus. good ROM joints. dpi. soft compartments.    	SKIN: Normal for age and race; warm; dry; good turgor; no apparent lesions or rash.   	NEURO: A & O x 3; face symmetric; grossly unremarkable.   PSYCHOLOGICAL: The patient’s mood and manner are appropriate.

## 2024-04-05 NOTE — ED PROVIDER NOTE - OBJECTIVE STATEMENT
58-year-old female with history of hypertension, COPD presents complaining of a wound to the left AC area over the past 3 days.  Patient states there has been a lice and scabies infestation in the home and she has been scratching her extremities.  She states she has applied the lice treatment many times and disposed of items in the home. denies IVDU.

## 2024-04-05 NOTE — ED PROVIDER NOTE - NSFOLLOWUPINSTRUCTIONS_ED_ALL_ED_FT
Cellulitis    Cellulitis is a skin infection caused by bacteria. This condition occurs most often in the arms and lower legs but can occur anywhere over the body. Symptoms include redness, swelling, warm skin, tenderness, and chills/fever. If you were prescribed an antibiotic medicine, take it as told by your health care provider. Do not stop taking the antibiotic even if you start to feel better.    SEEK IMMEDIATE MEDICAL CARE IF YOU HAVE ANY OF THE FOLLOWING SYMPTOMS: worsening fever, red streaks coming from affected area, vomiting or diarrhea, or dizziness/lightheadedness.     Please take antibiotics as prescribed  Keep wound clean and dry  Take Ibuprofen 600mg every 6-8 hours as needed for pain, take with food, and in addition you may take Tylenol 500 mg every 6-8 hours as needed for pain    Apply permethrin and use as directed. Wash all clothes and linens in hot weather.     PLEASE FOLLOW-UP WITH YOUR PRIMARY CARE DOCTOR IN 1-2 DAYS FOR FURTHER EVALUATION.      PLEASE TAKE ALL PAPERWORK FROM TODAY'S VISIT TO YOUR PRIMARY DOCTOR.     IF YOU DO NOT HAVE A PRIMARY CARE DOCTOR PLEASE REFER TO THE OFFICE/CLINIC INFORMATION GIVEN BELOW:    If you do not have a doctor, you can call our referral line to find a doctor that matches your insurance; the number is [phone].     You can also follow up with clinics listed below, if you do not have a doctor:  [address] [address]  To make an appointment, call [phone]    [facility]  Address: [address] [place] [address]  Appointment Center: [phone] ([phone])     To access your record on the [facility] portal [facility], please visit:  [email or web address]  If you are having difficulties setting this up, call [phone] and someone can assist you over the phone.     PLEASE RETURN TO THE ER IMMEDIATELY OR CALL [phone] ANY HIGH FEVER, CHEST PAIN, TROUBLE BREATHING, VOMITING, SEVERE PAIN, OR ANY OTHER CONCERNS

## 2024-04-05 NOTE — ED PROVIDER NOTE - CLINICAL SUMMARY MEDICAL DECISION MAKING FREE TEXT BOX
lice infestation, small area of cellulitis to left arm from scratching (due to itching), will cover with rx doxycycline. will give permethrin in ED, analgesia ordered. we discussed precautions, f/u pmd.

## 2024-04-05 NOTE — ED PROVIDER NOTE - PATIENT PORTAL LINK FT
You can access the FollowMyHealth Patient Portal offered by Health system by registering at the following website: [email or web address]. By joining [employer]’s FollowMyHealth portal, you will also be able to view your health information using other applications (apps) compatible with our system.

## 2024-04-05 NOTE — ED ADULT NURSE NOTE - OBJECTIVE STATEMENT
reports exposure to lice and bed bugs on Sunday after daughter's friends slept over - noticed possible lice on Monday - used OTC rinse/medications, but states she believes there are still bugs on her. scratched her left arm and believes it is now infected due to the scratching. open circular wound noted on left antecubital - red in nature with no discharge.

## 2024-04-05 NOTE — ED ADULT TRIAGE NOTE - CHIEF COMPLAINT QUOTE
states that she is been exposed to lice, scabies, and bedbugs (after her children attended a sleepover), states "I saw a bedbug and lice on my arm and tried to cut it off with a knife and know my arm is infected" (BEFAST-)

## 2024-04-08 DIAGNOSIS — L03.114 CELLULITIS OF LEFT UPPER LIMB: ICD-10-CM

## 2024-04-08 DIAGNOSIS — Z88.1 ALLERGY STATUS TO OTHER ANTIBIOTIC AGENTS STATUS: ICD-10-CM

## 2024-04-08 DIAGNOSIS — J44.9 CHRONIC OBSTRUCTIVE PULMONARY DISEASE, UNSPECIFIED: ICD-10-CM

## 2024-04-08 DIAGNOSIS — I10 ESSENTIAL (PRIMARY) HYPERTENSION: ICD-10-CM

## 2024-04-08 DIAGNOSIS — Z91.018 ALLERGY TO OTHER FOODS: ICD-10-CM

## 2024-04-08 DIAGNOSIS — B85.2 PEDICULOSIS, UNSPECIFIED: ICD-10-CM

## 2024-04-08 DIAGNOSIS — Z88.5 ALLERGY STATUS TO NARCOTIC AGENT: ICD-10-CM

## 2024-06-20 NOTE — ED ADULT NURSE NOTE - TEMPLATE
Subjective   Patient ID: Joanne Carpenter is a 64 y.o. female who presents for ckd/sglt2    Referring Provider: María Elena BOONE  HPI: CKD stage 3b. last EGFR 41 sCr 1.44    HTN:  /70  Home bps ranging in the 110s-120s  Medications  Amlodipine 2.5mg every day    DM: type 2 dm managed by pcp  A1C 7.2  Medications  Trulicity 1.5mg qweek     HLD:  LDL   On statin? Simvastatin 10mg qd    Medications reviewed for appropriate dosing in setting of CKD  Recommended changes:  - no adjustments needed at this time    Objective     There were no vitals taken for this visit.     Labs  Lab Results   Component Value Date    BILITOT 0.3 08/16/2023    CALCIUM 9.2 05/22/2024    CO2 26 05/22/2024     05/22/2024    CREATININE 1.44 (H) 05/22/2024    GLUCOSE 84 05/22/2024    ALKPHOS 58 08/16/2023    K 4.0 05/22/2024    PROT 7.4 05/22/2024     05/22/2024    AST 21 08/16/2023    ALT 13 08/16/2023    BUN 22 05/22/2024    ANIONGAP 13 05/22/2024    PHOS 3.4 05/22/2024    ALBUMIN 4.1 05/22/2024    GFRF 55 (A) 08/16/2023     Lab Results   Component Value Date    TRIG 120 08/16/2023    CHOL 194 08/16/2023    HDL 59.8 08/16/2023     Lab Results   Component Value Date    HGBA1C 7.2 (H) 02/26/2024       Current Outpatient Medications on File Prior to Visit   Medication Sig Dispense Refill    acetaminophen (Tylenol) 325 mg tablet Take by mouth every 6 hours if needed.      amLODIPine (Norvasc) 2.5 mg tablet Take 2 tablets (5 mg) by mouth once daily. 180 tablet 3    buPROPion XL (Wellbutrin XL) 150 mg 24 hr tablet TAKE 1 TABLET BY MOUTH EVERY DAY 90 tablet 3    buPROPion XL (Wellbutrin XL) 150 mg 24 hr tablet Take 1 tablet (150 mg) by mouth once daily.      buPROPion XL (Wellbutrin XL) 150 mg 24 hr tablet TAKE 1 TABLET BY MOUTH EVERY DAY 90 tablet 2    calcium carbonate-vitamin D3 500 mg-5 mcg (200 unit) tablet Take one(1) tablet three times daily.      dulaglutide (Trulicity) 1.5 mg/0.5 mL pen injector injection Inject 1.5 mg  under the skin 1 (one) time per week. 2 mL 11    ferrous sulfate 325 (65 Fe) MG tablet Take 1 tablet by mouth.      lidocaine (Lidoderm) 5 % patch Place 1 patch on the skin once daily.      metFORMIN (Glucophage) 500 mg tablet Take 1 tablet (500 mg) by mouth 2 times a day.      OneTouch Ultra Test strip       simvastatin (Zocor) 10 mg tablet TAKE 1 TABLET BY MOUTH EVERY DAY 90 tablet 2     No current facility-administered medications on file prior to visit.        Assessment/Plan   PATIENT EDUCATION/DISCUSSION:  - Counseled patient on MOA, expectations, side effects, duration of therapy, contraindications, administration, and monitoring parameters  - Answered all patient questions and concerns  - follow up 7/15 1p  - medicaid $0 for jardiance  _______________________________________________________________________  PLAN  1. START jardiance 10mg qd  2. Prescription sent to [facility] pharmacy for assistance on authorization and copay. Medication will be mailed to patient.    Pedro Art, PharmD    Continue all meds under the continuation of care with the referring provider and clinical pharmacy team.                Respiratory

## 2025-03-08 ENCOUNTER — EMERGENCY (EMERGENCY)
Facility: HOSPITAL | Age: 60
LOS: 1 days | Discharge: HOME | End: 2025-03-08
Attending: EMERGENCY MEDICINE | Admitting: EMERGENCY MEDICINE
Payer: MEDICARE

## 2025-03-08 VITALS
DIASTOLIC BLOOD PRESSURE: 62 MMHG | SYSTOLIC BLOOD PRESSURE: 92 MMHG | RESPIRATION RATE: 18 BRPM | TEMPERATURE: 98 F | OXYGEN SATURATION: 95 % | HEART RATE: 83 BPM

## 2025-03-08 VITALS
TEMPERATURE: 100 F | SYSTOLIC BLOOD PRESSURE: 142 MMHG | WEIGHT: 166.89 LBS | HEART RATE: 142 BPM | RESPIRATION RATE: 18 BRPM | DIASTOLIC BLOOD PRESSURE: 108 MMHG | OXYGEN SATURATION: 97 %

## 2025-03-08 DIAGNOSIS — J44.9 CHRONIC OBSTRUCTIVE PULMONARY DISEASE, UNSPECIFIED: ICD-10-CM

## 2025-03-08 DIAGNOSIS — Z86.73 PERSONAL HISTORY OF TRANSIENT ISCHEMIC ATTACK (TIA), AND CEREBRAL INFARCTION WITHOUT RESIDUAL DEFICITS: ICD-10-CM

## 2025-03-08 DIAGNOSIS — K64.8 OTHER HEMORRHOIDS: Chronic | ICD-10-CM

## 2025-03-08 LAB
ALBUMIN SERPL ELPH-MCNC: 3.4 G/DL — SIGNIFICANT CHANGE UP (ref 3.4–5)
ALP SERPL-CCNC: 75 U/L — SIGNIFICANT CHANGE UP (ref 40–120)
ALT FLD-CCNC: 31 U/L — SIGNIFICANT CHANGE UP (ref 12–42)
ANION GAP SERPL CALC-SCNC: 16 MMOL/L — SIGNIFICANT CHANGE UP (ref 9–16)
APPEARANCE UR: ABNORMAL
APTT BLD: 33.6 SEC — SIGNIFICANT CHANGE UP (ref 24.5–35.6)
AST SERPL-CCNC: 40 U/L — HIGH (ref 15–37)
BASOPHILS # BLD AUTO: 0.02 K/UL — SIGNIFICANT CHANGE UP (ref 0–0.2)
BASOPHILS NFR BLD AUTO: 0.2 % — SIGNIFICANT CHANGE UP (ref 0–2)
BILIRUB SERPL-MCNC: 0.5 MG/DL — SIGNIFICANT CHANGE UP (ref 0.2–1.2)
BILIRUB UR-MCNC: ABNORMAL
BUN SERPL-MCNC: 26 MG/DL — HIGH (ref 7–23)
CALCIUM SERPL-MCNC: 9.2 MG/DL — SIGNIFICANT CHANGE UP (ref 8.5–10.5)
CHLORIDE SERPL-SCNC: 94 MMOL/L — LOW (ref 96–108)
CO2 SERPL-SCNC: 26 MMOL/L — SIGNIFICANT CHANGE UP (ref 22–31)
COLOR SPEC: SIGNIFICANT CHANGE UP
CREAT SERPL-MCNC: 1.99 MG/DL — HIGH (ref 0.5–1.3)
DIFF PNL FLD: ABNORMAL
EGFR: 28 ML/MIN/1.73M2 — LOW
EOSINOPHIL # BLD AUTO: 0.02 K/UL — SIGNIFICANT CHANGE UP (ref 0–0.5)
EOSINOPHIL NFR BLD AUTO: 0.2 % — SIGNIFICANT CHANGE UP (ref 0–6)
FLUAV AG NPH QL: SIGNIFICANT CHANGE UP
FLUBV AG NPH QL: SIGNIFICANT CHANGE UP
GLUCOSE SERPL-MCNC: 214 MG/DL — HIGH (ref 70–99)
GLUCOSE UR QL: NEGATIVE MG/DL — SIGNIFICANT CHANGE UP
HCT VFR BLD CALC: 45 % — SIGNIFICANT CHANGE UP (ref 34.5–45)
HGB BLD-MCNC: 14.9 G/DL — SIGNIFICANT CHANGE UP (ref 11.5–15.5)
IMM GRANULOCYTES # BLD AUTO: 0.06 K/UL — SIGNIFICANT CHANGE UP (ref 0–0.07)
IMM GRANULOCYTES NFR BLD AUTO: 0.5 % — SIGNIFICANT CHANGE UP (ref 0–0.9)
INR BLD: 1.1 — SIGNIFICANT CHANGE UP (ref 0.85–1.16)
KETONES UR-MCNC: ABNORMAL MG/DL
LACTATE BLDV-MCNC: 1.2 MMOL/L — SIGNIFICANT CHANGE UP (ref 0.5–2)
LACTATE BLDV-MCNC: 3.5 MMOL/L — HIGH (ref 0.5–2)
LEUKOCYTE ESTERASE UR-ACNC: ABNORMAL
LYMPHOCYTES # BLD AUTO: 0.95 K/UL — LOW (ref 1–3.3)
LYMPHOCYTES NFR BLD AUTO: 8.2 % — LOW (ref 13–44)
MCHC RBC-ENTMCNC: 29.3 PG — SIGNIFICANT CHANGE UP (ref 27–34)
MCHC RBC-ENTMCNC: 33.1 G/DL — SIGNIFICANT CHANGE UP (ref 32–36)
MCV RBC AUTO: 88.4 FL — SIGNIFICANT CHANGE UP (ref 80–100)
MONOCYTES # BLD AUTO: 0.64 K/UL — SIGNIFICANT CHANGE UP (ref 0–0.9)
MONOCYTES NFR BLD AUTO: 5.5 % — SIGNIFICANT CHANGE UP (ref 2–14)
NEUTROPHILS # BLD AUTO: 9.86 K/UL — HIGH (ref 1.8–7.4)
NEUTROPHILS NFR BLD AUTO: 85.4 % — HIGH (ref 43–77)
NITRITE UR-MCNC: NEGATIVE — SIGNIFICANT CHANGE UP
NRBC # BLD AUTO: 0 K/UL — SIGNIFICANT CHANGE UP (ref 0–0)
NRBC # FLD: 0 K/UL — SIGNIFICANT CHANGE UP (ref 0–0)
NRBC BLD AUTO-RTO: 0 /100 WBCS — SIGNIFICANT CHANGE UP (ref 0–0)
PH UR: 5.5 — SIGNIFICANT CHANGE UP (ref 5–8)
PLATELET # BLD AUTO: 151 K/UL — SIGNIFICANT CHANGE UP (ref 150–400)
PMV BLD: 12.8 FL — SIGNIFICANT CHANGE UP (ref 7–13)
POTASSIUM SERPL-MCNC: 3.3 MMOL/L — LOW (ref 3.5–5.3)
POTASSIUM SERPL-SCNC: 3.3 MMOL/L — LOW (ref 3.5–5.3)
PROT SERPL-MCNC: 8.8 G/DL — HIGH (ref 6.4–8.2)
PROT UR-MCNC: 300 MG/DL
PROTHROM AB SERPL-ACNC: 12.8 SEC — SIGNIFICANT CHANGE UP (ref 9.9–13.4)
RBC # BLD: 5.09 M/UL — SIGNIFICANT CHANGE UP (ref 3.8–5.2)
RBC # FLD: 14.2 % — SIGNIFICANT CHANGE UP (ref 10.3–14.5)
RSV RNA NPH QL NAA+NON-PROBE: SIGNIFICANT CHANGE UP
SARS-COV-2 RNA SPEC QL NAA+PROBE: SIGNIFICANT CHANGE UP
SODIUM SERPL-SCNC: 136 MMOL/L — SIGNIFICANT CHANGE UP (ref 132–145)
SP GR SPEC: 1.03 — HIGH (ref 1–1.03)
TROPONIN I, HIGH SENSITIVITY RESULT: 7.1 NG/L — SIGNIFICANT CHANGE UP
UROBILINOGEN FLD QL: 1 MG/DL — SIGNIFICANT CHANGE UP (ref 0.2–1)
WBC # BLD: 11.55 K/UL — HIGH (ref 3.8–10.5)
WBC # FLD AUTO: 11.55 K/UL — HIGH (ref 3.8–10.5)

## 2025-03-08 PROCEDURE — 99291 CRITICAL CARE FIRST HOUR: CPT

## 2025-03-08 RX ORDER — CEFUROXIME SODIUM 1.5 G
1 VIAL (EA) INJECTION
Qty: 14 | Refills: 0
Start: 2025-03-08 | End: 2025-03-14

## 2025-03-08 RX ORDER — ACETAMINOPHEN 500 MG/5ML
650 LIQUID (ML) ORAL ONCE
Refills: 0 | Status: COMPLETED | OUTPATIENT
Start: 2025-03-08 | End: 2025-03-08

## 2025-03-08 RX ORDER — CEFTRIAXONE 500 MG/1
1000 INJECTION, POWDER, FOR SOLUTION INTRAMUSCULAR; INTRAVENOUS ONCE
Refills: 0 | Status: COMPLETED | OUTPATIENT
Start: 2025-03-08 | End: 2025-03-08

## 2025-03-08 RX ORDER — KETOROLAC TROMETHAMINE 30 MG/ML
15 INJECTION, SOLUTION INTRAMUSCULAR; INTRAVENOUS ONCE
Refills: 0 | Status: DISCONTINUED | OUTPATIENT
Start: 2025-03-08 | End: 2025-03-08

## 2025-03-08 RX ORDER — AZITHROMYCIN 250 MG
500 CAPSULE ORAL ONCE
Refills: 0 | Status: COMPLETED | OUTPATIENT
Start: 2025-03-08 | End: 2025-03-08

## 2025-03-08 RX ORDER — ONDANSETRON HCL/PF 4 MG/2 ML
4 VIAL (ML) INJECTION ONCE
Refills: 0 | Status: COMPLETED | OUTPATIENT
Start: 2025-03-08 | End: 2025-03-08

## 2025-03-08 RX ADMIN — KETOROLAC TROMETHAMINE 15 MILLIGRAM(S): 30 INJECTION, SOLUTION INTRAMUSCULAR; INTRAVENOUS at 12:29

## 2025-03-08 RX ADMIN — Medication 2300 MILLILITER(S): at 10:10

## 2025-03-08 RX ADMIN — Medication 255 MILLIGRAM(S): at 10:58

## 2025-03-08 RX ADMIN — Medication 650 MILLIGRAM(S): at 10:11

## 2025-03-08 RX ADMIN — Medication 4 MILLIGRAM(S): at 10:10

## 2025-03-08 RX ADMIN — CEFTRIAXONE 100 MILLIGRAM(S): 500 INJECTION, POWDER, FOR SOLUTION INTRAMUSCULAR; INTRAVENOUS at 10:10

## 2025-03-08 NOTE — ED PROVIDER NOTE - CRITICAL CARE ATTENDING CONTRIBUTION TO CARE
The patient was seen immediately upon arrival due to a high probability of imminent or life-threatening deterioration secondary to uti / sepsis, which required my direct attention, intervention, and personal management at the bedside. I have personally provided critical care time exclusive of time spent on separately billable procedures. Time includes review of laboratory data, radiology results, discussion with consultants, discussion with the patient's family, and monitoring for potential decompensation.

## 2025-03-08 NOTE — ED PROVIDER NOTE - OBJECTIVE STATEMENT
The patient is a pleasant 59-year-old month woman who presents to the emergency room with her nephew.    The patient states that for 1 week she has had generalized bodyaches generalized weakness diarrhea fever and chills and mild headache that is similar to prior headaches.    Patient states that she is that she has a history of COPD, stroke (no residual defects)  heart attack  old records at [facility] reviewed     The patient denies the following symptoms:  neck pain/neck stiffness, numbness/tingling, photophobia, change in vision/hearing/gait/mental status/speech,difficulty swallowing, focal weakness, rash, chest/neck/jaw/arm or back pain, palpitations, shortness of breath, diaphoresis, swelling to arm and/or legs, abdominal pain, abdominal distention, back pain, flank pain

## 2025-03-08 NOTE — ED PROVIDER NOTE - NEUROLOGICAL, MLM
Alert and oriented, no focal deficits, no motor or sensory deficits. pt ambulating from room 12 to bathroom and back without difficulty

## 2025-03-08 NOTE — ED PROVIDER NOTE - PATIENT PORTAL LINK FT
You can access the FollowMyHealth Patient Portal offered by [facility] by registering at the following website: [email or web address]. By joining [employer]’s FollowMyHealth portal, you will also be able to view your health information using other applications (apps) compatible with our system.

## 2025-03-08 NOTE — ED ADULT TRIAGE NOTE - CHIEF COMPLAINT QUOTE
Pt walk in reporting fever, headache, palpitations x5 days. STates took excedrin this AM w/out relief. Hx MI, stroke, HTN,  COPD.

## 2025-03-08 NOTE — ED PROVIDER NOTE - CONSTITUTIONAL, MLM
Dr. Sanchez Well appearing, awake, alert, oriented to person, place, time/situation and in no apparent distress. normal...

## 2025-03-08 NOTE — ED ADULT NURSE NOTE - OBJECTIVE STATEMENT
aox3, breathing even and unlabored c/o generalized fatigue, loss of appetite, migraine, diarrhea and fever x1 week. tachycardic upon arrival, patient placed on tele monitor. sepsis workup completed.

## 2025-03-08 NOTE — ED PROVIDER NOTE - CLINICAL SUMMARY MEDICAL DECISION MAKING FREE TEXT BOX
The patient is a pleasant 59-year-old month woman who presents to the emergency room with her nephew.  The patient states that for 1 week she has had generalized body aches generalized weakness diarrhea fever and chills and mild headache that is similar to prior headaches.    sepsis evaluation  iv labs meds cxr anf ekg The patient is a pleasant 59-year-old month woman who presents to the emergency room with her nephew.  The patient states that for 1 week she has had generalized body aches generalized weakness diarrhea fever and chills and mild headache that is similar to prior headaches.    sepsis evaluation  iv labs meds cxr and ekg    progressive improvement throughout ed stay  vs normal @ 130pm  repeat lactate normal   pt is smiling talking on cell phone feels "much better"    A medical screening examination was performed and no emergency medical condition was identified.    The patient's symptoms progressively improved throughout the ED stay.  The patient tolerated PO fluids.    At the time of discharge from the Emergency Department, the patient is alert with fluent appropriate speech and ambulatory without difficulty. The patient demonstrates capacity at time of discharge and verbally consents to discharge.     ED evaluation and management discussed with the patient and family (if available) in detail.  Close PMD and/or specialist follow up explained and encouraged.  Strict ED return instructions discussed in detail for any worsening or new symptoms. The patient and family (if present) was given the opportunity to ask questions about their ER evaluation, discharge diagnosis and discharge instructions. The patient verbalized understanding of this information and instructions and importantly the need to return to the ED for  worsening of illness or for any concern.    The patient received a printed version of the discharge instructions. The patient verbally expressed understanding that the Emergency Department diagnosis is a preliminary diagnosis often based on limited information and that the patient must adhere to the follow-up plan as discussed.

## 2025-03-09 LAB
E COLI DNA BLD POS QL NAA+NON-PROBE: SIGNIFICANT CHANGE UP
GRAM STN FLD: ABNORMAL
METHOD TYPE: SIGNIFICANT CHANGE UP
SPECIMEN SOURCE: SIGNIFICANT CHANGE UP

## 2025-03-10 LAB
-  AMPICILLIN/SULBACTAM: SIGNIFICANT CHANGE UP
-  AMPICILLIN: SIGNIFICANT CHANGE UP
-  CEFAZOLIN: SIGNIFICANT CHANGE UP
-  CEFEPIME: SIGNIFICANT CHANGE UP
-  CEFOXITIN: SIGNIFICANT CHANGE UP
-  CEFTRIAXONE: SIGNIFICANT CHANGE UP
-  CIPROFLOXACIN: SIGNIFICANT CHANGE UP
-  GENTAMICIN: SIGNIFICANT CHANGE UP
-  LEVOFLOXACIN: SIGNIFICANT CHANGE UP
-  PIPERACILLIN/TAZOBACTAM: SIGNIFICANT CHANGE UP
-  TOBRAMYCIN: SIGNIFICANT CHANGE UP
-  TRIMETHOPRIM/SULFAMETHOXAZOLE: SIGNIFICANT CHANGE UP
CULTURE RESULTS: ABNORMAL
CULTURE RESULTS: ABNORMAL
GRAM STN FLD: ABNORMAL
METHOD TYPE: SIGNIFICANT CHANGE UP
ORGANISM # SPEC MICROSCOPIC CNT: ABNORMAL
ORGANISM # SPEC MICROSCOPIC CNT: ABNORMAL
ORGANISM # SPEC MICROSCOPIC CNT: SIGNIFICANT CHANGE UP
SPECIMEN SOURCE: SIGNIFICANT CHANGE UP
SPECIMEN SOURCE: SIGNIFICANT CHANGE UP

## 2025-03-11 ENCOUNTER — INPATIENT (INPATIENT)
Facility: HOSPITAL | Age: 60
LOS: 1 days | Discharge: LEFT BEFORE DISCHARGE | End: 2025-03-13
Attending: STUDENT IN AN ORGANIZED HEALTH CARE EDUCATION/TRAINING PROGRAM | Admitting: STUDENT IN AN ORGANIZED HEALTH CARE EDUCATION/TRAINING PROGRAM
Payer: MEDICARE

## 2025-03-11 VITALS
OXYGEN SATURATION: 95 % | TEMPERATURE: 98 F | SYSTOLIC BLOOD PRESSURE: 120 MMHG | DIASTOLIC BLOOD PRESSURE: 66 MMHG | HEART RATE: 94 BPM | RESPIRATION RATE: 18 BRPM

## 2025-03-11 DIAGNOSIS — Z88.1 ALLERGY STATUS TO OTHER ANTIBIOTIC AGENTS: ICD-10-CM

## 2025-03-11 DIAGNOSIS — A41.9 SEPSIS, UNSPECIFIED ORGANISM: ICD-10-CM

## 2025-03-11 DIAGNOSIS — R00.0 TACHYCARDIA, UNSPECIFIED: ICD-10-CM

## 2025-03-11 DIAGNOSIS — N39.0 URINARY TRACT INFECTION, SITE NOT SPECIFIED: ICD-10-CM

## 2025-03-11 DIAGNOSIS — R52 PAIN, UNSPECIFIED: ICD-10-CM

## 2025-03-11 DIAGNOSIS — K64.8 OTHER HEMORRHOIDS: Chronic | ICD-10-CM

## 2025-03-11 DIAGNOSIS — Z91.018 ALLERGY TO OTHER FOODS: ICD-10-CM

## 2025-03-11 DIAGNOSIS — Z88.5 ALLERGY STATUS TO NARCOTIC AGENT: ICD-10-CM

## 2025-03-11 LAB
ALBUMIN SERPL ELPH-MCNC: 3 G/DL — LOW (ref 3.4–5)
ALP SERPL-CCNC: 68 U/L — SIGNIFICANT CHANGE UP (ref 40–120)
ALT FLD-CCNC: 69 U/L — HIGH (ref 12–42)
ANION GAP SERPL CALC-SCNC: 10 MMOL/L — SIGNIFICANT CHANGE UP (ref 9–16)
APPEARANCE UR: CLEAR — SIGNIFICANT CHANGE UP
APTT BLD: 27.4 SEC — SIGNIFICANT CHANGE UP (ref 24.5–35.6)
AST SERPL-CCNC: 58 U/L — HIGH (ref 15–37)
BASOPHILS # BLD AUTO: 0.04 K/UL — SIGNIFICANT CHANGE UP (ref 0–0.2)
BASOPHILS NFR BLD AUTO: 0.5 % — SIGNIFICANT CHANGE UP (ref 0–2)
BILIRUB SERPL-MCNC: 0.3 MG/DL — SIGNIFICANT CHANGE UP (ref 0.2–1.2)
BILIRUB UR-MCNC: NEGATIVE — SIGNIFICANT CHANGE UP
BUN SERPL-MCNC: 21 MG/DL — SIGNIFICANT CHANGE UP (ref 7–23)
CALCIUM SERPL-MCNC: 10 MG/DL — SIGNIFICANT CHANGE UP (ref 8.5–10.5)
CHLORIDE SERPL-SCNC: 102 MMOL/L — SIGNIFICANT CHANGE UP (ref 96–108)
CO2 SERPL-SCNC: 27 MMOL/L — SIGNIFICANT CHANGE UP (ref 22–31)
COLOR SPEC: YELLOW — SIGNIFICANT CHANGE UP
CREAT SERPL-MCNC: 1.03 MG/DL — SIGNIFICANT CHANGE UP (ref 0.5–1.3)
DIFF PNL FLD: NEGATIVE — SIGNIFICANT CHANGE UP
EGFR: 63 ML/MIN/1.73M2 — SIGNIFICANT CHANGE UP
EGFR: 63 ML/MIN/1.73M2 — SIGNIFICANT CHANGE UP
EOSINOPHIL # BLD AUTO: 0.11 K/UL — SIGNIFICANT CHANGE UP (ref 0–0.5)
EOSINOPHIL NFR BLD AUTO: 1.5 % — SIGNIFICANT CHANGE UP (ref 0–6)
GLUCOSE SERPL-MCNC: 144 MG/DL — HIGH (ref 70–99)
GLUCOSE UR QL: NEGATIVE MG/DL — SIGNIFICANT CHANGE UP
HCT VFR BLD CALC: 40.8 % — SIGNIFICANT CHANGE UP (ref 34.5–45)
HGB BLD-MCNC: 13.7 G/DL — SIGNIFICANT CHANGE UP (ref 11.5–15.5)
IMM GRANULOCYTES # BLD AUTO: 0.04 K/UL — SIGNIFICANT CHANGE UP (ref 0–0.07)
IMM GRANULOCYTES NFR BLD AUTO: 0.5 % — SIGNIFICANT CHANGE UP (ref 0–0.9)
INR BLD: 1.04 — SIGNIFICANT CHANGE UP (ref 0.85–1.16)
KETONES UR-MCNC: NEGATIVE MG/DL — SIGNIFICANT CHANGE UP
LACTATE BLDV-MCNC: 1.2 MMOL/L — SIGNIFICANT CHANGE UP (ref 0.5–2)
LACTATE BLDV-MCNC: 2.3 MMOL/L — HIGH (ref 0.5–2)
LEUKOCYTE ESTERASE UR-ACNC: NEGATIVE — SIGNIFICANT CHANGE UP
LYMPHOCYTES # BLD AUTO: 2.5 K/UL — SIGNIFICANT CHANGE UP (ref 1–3.3)
LYMPHOCYTES NFR BLD AUTO: 33.7 % — SIGNIFICANT CHANGE UP (ref 13–44)
MCHC RBC-ENTMCNC: 29.8 PG — SIGNIFICANT CHANGE UP (ref 27–34)
MCHC RBC-ENTMCNC: 33.6 G/DL — SIGNIFICANT CHANGE UP (ref 32–36)
MCV RBC AUTO: 88.7 FL — SIGNIFICANT CHANGE UP (ref 80–100)
MONOCYTES # BLD AUTO: 0.84 K/UL — SIGNIFICANT CHANGE UP (ref 0–0.9)
MONOCYTES NFR BLD AUTO: 11.3 % — SIGNIFICANT CHANGE UP (ref 2–14)
NEUTROPHILS # BLD AUTO: 3.88 K/UL — SIGNIFICANT CHANGE UP (ref 1.8–7.4)
NEUTROPHILS NFR BLD AUTO: 52.5 % — SIGNIFICANT CHANGE UP (ref 43–77)
NITRITE UR-MCNC: NEGATIVE — SIGNIFICANT CHANGE UP
NRBC # BLD AUTO: 0 K/UL — SIGNIFICANT CHANGE UP (ref 0–0)
NRBC # FLD: 0 K/UL — SIGNIFICANT CHANGE UP (ref 0–0)
NRBC BLD AUTO-RTO: 0 /100 WBCS — SIGNIFICANT CHANGE UP (ref 0–0)
PH UR: 6 — SIGNIFICANT CHANGE UP (ref 5–8)
PLATELET # BLD AUTO: 243 K/UL — SIGNIFICANT CHANGE UP (ref 150–400)
PMV BLD: 12.9 FL — SIGNIFICANT CHANGE UP (ref 7–13)
POTASSIUM SERPL-MCNC: 3.8 MMOL/L — SIGNIFICANT CHANGE UP (ref 3.5–5.3)
POTASSIUM SERPL-SCNC: 3.8 MMOL/L — SIGNIFICANT CHANGE UP (ref 3.5–5.3)
PROT SERPL-MCNC: 7.9 G/DL — SIGNIFICANT CHANGE UP (ref 6.4–8.2)
PROT UR-MCNC: ABNORMAL MG/DL
PROTHROM AB SERPL-ACNC: 12.1 SEC — SIGNIFICANT CHANGE UP (ref 9.9–13.4)
RBC # BLD: 4.6 M/UL — SIGNIFICANT CHANGE UP (ref 3.8–5.2)
RBC # FLD: 14.5 % — SIGNIFICANT CHANGE UP (ref 10.3–14.5)
SODIUM SERPL-SCNC: 139 MMOL/L — SIGNIFICANT CHANGE UP (ref 132–145)
SP GR SPEC: 1.02 — SIGNIFICANT CHANGE UP (ref 1–1.03)
TROPONIN I, HIGH SENSITIVITY RESULT: <4 NG/L — SIGNIFICANT CHANGE UP
UROBILINOGEN FLD QL: 0.2 MG/DL — SIGNIFICANT CHANGE UP (ref 0.2–1)
WBC # BLD: 7.41 K/UL — SIGNIFICANT CHANGE UP (ref 3.8–10.5)
WBC # FLD AUTO: 7.41 K/UL — SIGNIFICANT CHANGE UP (ref 3.8–10.5)

## 2025-03-11 PROCEDURE — 99285 EMERGENCY DEPT VISIT HI MDM: CPT

## 2025-03-11 PROCEDURE — 71045 X-RAY EXAM CHEST 1 VIEW: CPT | Mod: 26

## 2025-03-11 PROCEDURE — 74177 CT ABD & PELVIS W/CONTRAST: CPT | Mod: 26

## 2025-03-11 RX ORDER — ACETAMINOPHEN 500 MG/5ML
650 LIQUID (ML) ORAL ONCE
Refills: 0 | Status: COMPLETED | OUTPATIENT
Start: 2025-03-11 | End: 2025-03-11

## 2025-03-11 RX ORDER — PIPERACILLIN-TAZO-DEXTROSE,ISO 3.375G/5
3.38 IV SOLUTION, PIGGYBACK PREMIX FROZEN(ML) INTRAVENOUS ONCE
Refills: 0 | Status: COMPLETED | OUTPATIENT
Start: 2025-03-11 | End: 2025-03-11

## 2025-03-11 RX ADMIN — Medication 1000 MILLILITER(S): at 15:41

## 2025-03-11 RX ADMIN — Medication 200 GRAM(S): at 15:42

## 2025-03-11 NOTE — ED ADULT NURSE REASSESSMENT NOTE - NS ED NURSE REASSESS COMMENT FT1
Pt received from JONATHAN Castrejon. Pt is A&Ox4 at this time and reports no additional complaints at this time. Pain is controlled and denies subjective fever/chills.

## 2025-03-11 NOTE — ED ADULT NURSE REASSESSMENT NOTE - NS ED NURSE REASSESS COMMENT FT1
Pt updated on transportation to [facility]. Pt denies any needs at this time.

## 2025-03-11 NOTE — ED PROVIDER NOTE - CLINICAL SUMMARY MEDICAL DECISION MAKING FREE TEXT BOX
Patient with history of hypertension, COPD, CAD, seen here 3 days ago for fever, chills, diarrhea, now called back for blood cultures positive for E. coli x 2.  On exam, patient is afebrile, vital signs are stable.  Patient is comfortable appearing in no acute distress.  Plan for sepsis workup, IV Zosyn, admission.

## 2025-03-11 NOTE — ED PROVIDER NOTE - OBJECTIVE STATEMENT
59-year-old female with history of COPD, CVA without residual deficit, CAD, seen here 3 days ago for diffuse bodyaches, weakness, diarrhea, fever, and chills, DC'd on p.o. cefuroxime, called back for positive blood cultures.  Both blood cultures positive for E. coli.  Patient reports persistent generalized malaise, resolution of fever and chills.  States she has been having intermittent diarrhea for several months.  No blood in stool.  No nausea or vomiting.

## 2025-03-11 NOTE — ED ADULT NURSE NOTE - OBJECTIVE STATEMENT
Patient with history of hypertension, COPD, CAD, seen here 3 days ago for fever, chills, diarrhea, now called back for blood cultures positive for E. coli x 2. VSS resting comfortably in chair. Complaints of diarrhea for months, no further complaints.

## 2025-03-11 NOTE — ED PROVIDER NOTE - QTC
Return OB Office Visit    CC:   Chief Complaint   Patient presents with    Routine Prenatal Visit     pt states she has kidney stones was seen in ER 11/15/21. pt states she is very uncomfortable. HPI:  Patient seen and examined. No concerns/complaints. Denies VB, LOF, ctx. +Fm. Denies headaches, vision changes, RUQ pain, increased LE edema. Denies chest pain, shortness of breath, fever, chills, nausea, vomiting. Review of Systems: The following ROS was otherwise negative, except as noted in the HPI: constitutional, HEENT, respiratory, cardiovascular, gastrointestinal, genitourinary, skin, musculoskeletal, neurological, psych    Objective:  /71   Wt 123 lb (55.8 kg)   LMP 2021   BMI 21.79 kg/m²     Gravid, non tender, no flank pain    FHR: Positive by doppler    Assessment/Plan:   Silva Chris is a 32 y.o.  at 26w2d who presents for routine OB visit     Diagnosis Orders   1. Previous  delivery affecting pregnancy     2. High risk for intrapartum complications in second trimester     3. 26 weeks gestation of pregnancy       No orders of the defined types were placed in this encounter. Patient states she has bilateral kidney stones diagnosed by ultrasound 3 days ago. She will continue to push fluids monitor for change in symptoms. Pain management. Return in about 2 weeks (around 2021) for follow up appointment.       Bala Xiao MD [phone]

## 2025-03-12 ENCOUNTER — TRANSCRIPTION ENCOUNTER (OUTPATIENT)
Age: 60
End: 2025-03-12

## 2025-03-12 DIAGNOSIS — I25.10 ATHEROSCLEROTIC HEART DISEASE OF NATIVE CORONARY ARTERY WITHOUT ANGINA PECTORIS: ICD-10-CM

## 2025-03-12 DIAGNOSIS — J44.89 OTHER SPECIFIED CHRONIC OBSTRUCTIVE PULMONARY DISEASE: ICD-10-CM

## 2025-03-12 DIAGNOSIS — R78.81 BACTEREMIA: ICD-10-CM

## 2025-03-12 DIAGNOSIS — I10 ESSENTIAL (PRIMARY) HYPERTENSION: ICD-10-CM

## 2025-03-12 DIAGNOSIS — Z29.9 ENCOUNTER FOR PROPHYLACTIC MEASURES, UNSPECIFIED: ICD-10-CM

## 2025-03-12 DIAGNOSIS — N12 TUBULO-INTERSTITIAL NEPHRITIS, NOT SPECIFIED AS ACUTE OR CHRONIC: ICD-10-CM

## 2025-03-12 DIAGNOSIS — F17.200 NICOTINE DEPENDENCE, UNSPECIFIED, UNCOMPLICATED: ICD-10-CM

## 2025-03-12 DIAGNOSIS — N17.0 ACUTE KIDNEY FAILURE WITH TUBULAR NECROSIS: ICD-10-CM

## 2025-03-12 LAB
ANION GAP SERPL CALC-SCNC: 12 MMOL/L — SIGNIFICANT CHANGE UP (ref 5–17)
BASOPHILS # BLD AUTO: 0.03 K/UL — SIGNIFICANT CHANGE UP (ref 0–0.2)
BASOPHILS NFR BLD AUTO: 0.5 % — SIGNIFICANT CHANGE UP (ref 0–2)
BUN SERPL-MCNC: 13 MG/DL — SIGNIFICANT CHANGE UP (ref 7–23)
CALCIUM SERPL-MCNC: 9.2 MG/DL — SIGNIFICANT CHANGE UP (ref 8.4–10.5)
CHLORIDE SERPL-SCNC: 102 MMOL/L — SIGNIFICANT CHANGE UP (ref 96–108)
CO2 SERPL-SCNC: 27 MMOL/L — SIGNIFICANT CHANGE UP (ref 22–31)
CREAT SERPL-MCNC: 0.72 MG/DL — SIGNIFICANT CHANGE UP (ref 0.5–1.3)
CULTURE RESULTS: NO GROWTH — SIGNIFICANT CHANGE UP
EGFR: 96 ML/MIN/1.73M2 — SIGNIFICANT CHANGE UP
EGFR: 96 ML/MIN/1.73M2 — SIGNIFICANT CHANGE UP
EOSINOPHIL # BLD AUTO: 0.18 K/UL — SIGNIFICANT CHANGE UP (ref 0–0.5)
EOSINOPHIL NFR BLD AUTO: 2.9 % — SIGNIFICANT CHANGE UP (ref 0–6)
GLUCOSE SERPL-MCNC: 167 MG/DL — HIGH (ref 70–99)
HCT VFR BLD CALC: 36.9 % — SIGNIFICANT CHANGE UP (ref 34.5–45)
HGB BLD-MCNC: 12.5 G/DL — SIGNIFICANT CHANGE UP (ref 11.5–15.5)
IMM GRANULOCYTES NFR BLD AUTO: 0.3 % — SIGNIFICANT CHANGE UP (ref 0–0.9)
LYMPHOCYTES # BLD AUTO: 2.34 K/UL — SIGNIFICANT CHANGE UP (ref 1–3.3)
LYMPHOCYTES # BLD AUTO: 38 % — SIGNIFICANT CHANGE UP (ref 13–44)
MAGNESIUM SERPL-MCNC: 1.9 MG/DL — SIGNIFICANT CHANGE UP (ref 1.6–2.6)
MCHC RBC-ENTMCNC: 30.5 PG — SIGNIFICANT CHANGE UP (ref 27–34)
MCHC RBC-ENTMCNC: 33.9 G/DL — SIGNIFICANT CHANGE UP (ref 32–36)
MCV RBC AUTO: 90 FL — SIGNIFICANT CHANGE UP (ref 80–100)
MONOCYTES # BLD AUTO: 0.71 K/UL — SIGNIFICANT CHANGE UP (ref 0–0.9)
MONOCYTES NFR BLD AUTO: 11.5 % — SIGNIFICANT CHANGE UP (ref 2–14)
NEUTROPHILS # BLD AUTO: 2.88 K/UL — SIGNIFICANT CHANGE UP (ref 1.8–7.4)
NEUTROPHILS NFR BLD AUTO: 46.8 % — SIGNIFICANT CHANGE UP (ref 43–77)
NRBC BLD AUTO-RTO: 0 /100 WBCS — SIGNIFICANT CHANGE UP (ref 0–0)
PHOSPHATE SERPL-MCNC: 3.6 MG/DL — SIGNIFICANT CHANGE UP (ref 2.5–4.5)
PLATELET # BLD AUTO: 239 K/UL — SIGNIFICANT CHANGE UP (ref 150–400)
POTASSIUM SERPL-MCNC: 3 MMOL/L — LOW (ref 3.5–5.3)
POTASSIUM SERPL-SCNC: 3 MMOL/L — LOW (ref 3.5–5.3)
RBC # BLD: 4.1 M/UL — SIGNIFICANT CHANGE UP (ref 3.8–5.2)
RBC # FLD: 14.3 % — SIGNIFICANT CHANGE UP (ref 10.3–14.5)
SODIUM SERPL-SCNC: 141 MMOL/L — SIGNIFICANT CHANGE UP (ref 135–145)
SPECIMEN SOURCE: SIGNIFICANT CHANGE UP
WBC # BLD: 6.16 K/UL — SIGNIFICANT CHANGE UP (ref 3.8–10.5)
WBC # FLD AUTO: 6.16 K/UL — SIGNIFICANT CHANGE UP (ref 3.8–10.5)

## 2025-03-12 PROCEDURE — 99223 1ST HOSP IP/OBS HIGH 75: CPT | Mod: GC

## 2025-03-12 RX ORDER — LISINOPRIL 5 MG/1
40 TABLET ORAL EVERY 24 HOURS
Refills: 0 | Status: DISCONTINUED | OUTPATIENT
Start: 2025-03-12 | End: 2025-03-13

## 2025-03-12 RX ORDER — NICOTINE POLACRILEX 4 MG/1
2 GUM, CHEWING ORAL EVERY 8 HOURS
Refills: 0 | Status: DISCONTINUED | OUTPATIENT
Start: 2025-03-12 | End: 2025-03-13

## 2025-03-12 RX ORDER — ACETAMINOPHEN 500 MG/5ML
650 LIQUID (ML) ORAL EVERY 6 HOURS
Refills: 0 | Status: DISCONTINUED | OUTPATIENT
Start: 2025-03-12 | End: 2025-03-13

## 2025-03-12 RX ORDER — ATORVASTATIN CALCIUM 80 MG/1
40 TABLET, FILM COATED ORAL AT BEDTIME
Refills: 0 | Status: DISCONTINUED | OUTPATIENT
Start: 2025-03-12 | End: 2025-03-13

## 2025-03-12 RX ORDER — ONDANSETRON HCL/PF 4 MG/2 ML
4 VIAL (ML) INJECTION EVERY 8 HOURS
Refills: 0 | Status: DISCONTINUED | OUTPATIENT
Start: 2025-03-12 | End: 2025-03-13

## 2025-03-12 RX ORDER — CEFTRIAXONE 500 MG/1
2000 INJECTION, POWDER, FOR SOLUTION INTRAMUSCULAR; INTRAVENOUS EVERY 24 HOURS
Refills: 0 | Status: DISCONTINUED | OUTPATIENT
Start: 2025-03-12 | End: 2025-03-13

## 2025-03-12 RX ORDER — MELATONIN 5 MG
3 TABLET ORAL AT BEDTIME
Refills: 0 | Status: DISCONTINUED | OUTPATIENT
Start: 2025-03-12 | End: 2025-03-13

## 2025-03-12 RX ORDER — ASPIRIN 325 MG
81 TABLET ORAL DAILY
Refills: 0 | Status: DISCONTINUED | OUTPATIENT
Start: 2025-03-12 | End: 2025-03-13

## 2025-03-12 RX ORDER — B1/B2/B3/B5/B6/B12/VIT C/FOLIC 500-0.5 MG
1 TABLET ORAL DAILY
Refills: 0 | Status: DISCONTINUED | OUTPATIENT
Start: 2025-03-12 | End: 2025-03-13

## 2025-03-12 RX ORDER — PIPERACILLIN-TAZO-DEXTROSE,ISO 3.375G/5
3.38 IV SOLUTION, PIGGYBACK PREMIX FROZEN(ML) INTRAVENOUS EVERY 8 HOURS
Refills: 0 | Status: DISCONTINUED | OUTPATIENT
Start: 2025-03-12 | End: 2025-03-12

## 2025-03-12 RX ORDER — IPRATROPIUM BROMIDE AND ALBUTEROL SULFATE .5; 2.5 MG/3ML; MG/3ML
3 SOLUTION RESPIRATORY (INHALATION) EVERY 8 HOURS
Refills: 0 | Status: DISCONTINUED | OUTPATIENT
Start: 2025-03-12 | End: 2025-03-13

## 2025-03-12 RX ORDER — ENOXAPARIN SODIUM 100 MG/ML
40 INJECTION SUBCUTANEOUS EVERY 24 HOURS
Refills: 0 | Status: DISCONTINUED | OUTPATIENT
Start: 2025-03-12 | End: 2025-03-13

## 2025-03-12 RX ORDER — MAGNESIUM, ALUMINUM HYDROXIDE 200-200 MG
30 TABLET,CHEWABLE ORAL EVERY 4 HOURS
Refills: 0 | Status: DISCONTINUED | OUTPATIENT
Start: 2025-03-12 | End: 2025-03-13

## 2025-03-12 RX ORDER — OXYCODONE HYDROCHLORIDE 30 MG/1
5 TABLET ORAL ONCE
Refills: 0 | Status: DISCONTINUED | OUTPATIENT
Start: 2025-03-12 | End: 2025-03-12

## 2025-03-12 RX ADMIN — ENOXAPARIN SODIUM 40 MILLIGRAM(S): 100 INJECTION SUBCUTANEOUS at 06:12

## 2025-03-12 RX ADMIN — Medication 40 MILLIEQUIVALENT(S): at 09:07

## 2025-03-12 RX ADMIN — Medication 40 MILLIEQUIVALENT(S): at 11:54

## 2025-03-12 RX ADMIN — Medication 1 TABLET(S): at 18:19

## 2025-03-12 RX ADMIN — Medication 1 DOSE(S): at 06:12

## 2025-03-12 RX ADMIN — ATORVASTATIN CALCIUM 40 MILLIGRAM(S): 80 TABLET, FILM COATED ORAL at 21:56

## 2025-03-12 RX ADMIN — CEFTRIAXONE 100 MILLIGRAM(S): 500 INJECTION, POWDER, FOR SOLUTION INTRAMUSCULAR; INTRAVENOUS at 07:13

## 2025-03-12 RX ADMIN — LISINOPRIL 40 MILLIGRAM(S): 5 TABLET ORAL at 06:12

## 2025-03-12 RX ADMIN — Medication 81 MILLIGRAM(S): at 11:54

## 2025-03-12 RX ADMIN — Medication 1 DOSE(S): at 18:18

## 2025-03-12 RX ADMIN — Medication 25 GRAM(S): at 01:43

## 2025-03-12 RX ADMIN — Medication 100 MILLIGRAM(S): at 18:18

## 2025-03-12 RX ADMIN — OXYCODONE HYDROCHLORIDE 5 MILLIGRAM(S): 30 TABLET ORAL at 06:48

## 2025-03-12 NOTE — H&P ADULT - PROBLEM SELECTOR PLAN 3
Pt is a current smoker with COPD on home Advair-Diskus 250-50 1puff BID, Albuterol INH and Neb PRN. and uses CPAP at home however currently CPAP is getting serviced. On exam lungs CTABL.     - c/w  Advair-Diskus 250-50 1puff BID  - c/w duonebs q8h PRN   - CPAP at night   - SpO2 goal 88-92% resolved with IVFs, encourage PO intake

## 2025-03-12 NOTE — DIETITIAN INITIAL EVALUATION ADULT - OTHER INFO
Per H&P: Pt is a 59F with PMHx of COPD, CAD, CVA who presenting to ED 3 days prior for diffuse body aches, weakness, diarrhea, fever, and chills was sent home from ED with PO Cefuroxime. Pt was then called back for positive blood cultures x2  growing E. coli.  Patient reports persistent generalized malaise with resolution of fever and chills.  States she has been having intermittent diarrhea for several months.  No blood in stool.  No nausea or vomiting     Met with pt this AM at bedside. Pt was seated upright and able to articulate her nutrition hx well. Pt reported lemon allergy - denied citrus allergy. Pt reported low appetite and PO appetite x months - denied following any specific diet PTA. Pt reported current appetite and PO intake is very good, mentioned she ate 100% breakfast tray. Pt denied chewing/swallowing difficulties and nausea/vomiting/constipation, however endorsed diarrhea, stated last BM this AM 3/12. RD encouraged adequate fluid consumption to avoid dehydration in setting of diarrhea. Pt reported usual body weight 190 pounds x 3 months ago and endorsed weight loss secondary to decreased PO intake (14.7% wt loss x 3 months - meets criteria for malnutrition). Pt requesting nutrition education - RD reviewed general healthful nutrition therapy education - handout provided (General Healthful Nutrition Therapy; source: nutrition care manual). Pt was accepting to RD education and verbalized understanding. Pt declined nutrition focused physical exam at this time.    *Note:   - K+ 3.0 mmol/L (3/12)

## 2025-03-12 NOTE — DISCHARGE NOTE PROVIDER - DETAILS OF MALNUTRITION DIAGNOSIS/DIAGNOSES
This patient has been assessed with a concern for Malnutrition and was treated during this hospitalization for the following Nutrition diagnosis/diagnoses:     -  03/12/2025: Severe protein-calorie malnutrition

## 2025-03-12 NOTE — DISCHARGE NOTE PROVIDER - HOSPITAL COURSE
[ ASSESSMENT/1L (came w..., found to have...) ]     Hospital course (problem-based):    New medications:   Changes to old medications:  Items to follow up outpatient:  Physical exam at time of discharge:   Amy Elise is a 59F with PMHx of COPD, CAD, HTN, CVA who presenting with diffuse body aches and fever found to have Pyelonephritis and bacteremic with Ecoli, admitted for IV abx.     Hospital course (problem-based):    #Bacteremia   Pt presented to the ED on March 8th c/o diffuse body aches, weakness, diarrhea, fever and chills for 1 week, blood cultures were obtained. Sent home on PO Cefuroxime 250 mg tablet. Was notified on March 11th of positive blood cultures x 2 growing E.coli. Afebrile since admission.   - treated with 1 dose of Zosyn IV 3.375g (3/11)  - switched to Ceftriaxone 2g Q24hrs (3/12-___)  - Sent on PO Ciprofloxacin _____ (last dose ___)    #Pyelonephritis   In ED, pt denied any dysuria but presented with body aches, back pain, weakness, and diarrhea.   UA: (+) Leukocyte esterase, Bacteria and WBC  CT scan: Bilateral pyelonephritis, right greater than the left   - same as above     #Hypokalemia   K: 3.0  - Repleted with potassium chloride powder PO 40meq q2hrs 2 doses     CHRONIC  #COPD   - c/w Advair-Diskus 250-50 1 puff BID  - c/w duonebs q8h PRN  - CPAP at night     #HTN  - home meds continued: Lisinopril-HCTZ 40-25mg qd    #CAD  - home meds continued: ASA 81, Atorvastatin 40mg qhs      New medications:   Changes to old medications:  Items to follow up outpatient:  Physical exam at time of discharge:   Amy Elise is a 59F with PMHx of COPD, CAD, HTN, CVA who presenting with diffuse body aches and fever found to have Pyelonephritis and bacteremic with E coli, admitted for IV abx. S/p 1 day of zosyn and 1 day of CTX, now being discharged on PO antibiotics.    Hospital course (problem-based):    #Bacteremia   Pt presented to the ED on March 8th c/o diffuse body aches, weakness, diarrhea, fever and chills for 1 week, blood cultures were obtained. Sent home on PO Cefuroxime 250 mg tablet. Was notified on March 11th of positive blood cultures x 2 growing E.coli. Afebrile since admission.   - treated with 1 dose of Zosyn IV 3.375g (3/11)  - switched to Ceftriaxone 2g Q24hrs (3/12)  - Sent on PO Levaquin 750mg q24h x5d (last dose 3/17)    #Pyelonephritis   In ED, pt denied any dysuria but presented with body aches, back pain, weakness, and diarrhea.   UA: (+) Leukocyte esterase, Bacteria and WBC  CT scan: Bilateral pyelonephritis, right greater than the left   - same as above     #Hypokalemia   K: 3.0  - Repleted with potassium chloride powder PO 40meq q2hrs 2 doses   - repeat ____    CHRONIC  #COPD   - c/w Advair-Diskus 250-50 1 puff BID  - c/w albuterol  - CPAP at night    #HTN  - home meds continued: Lisinopril-HCTZ 40-25mg qd    #CAD  - home meds continued: ASA 81, Atorvastatin 40mg qhs    New medications: Levaquin 750mg q24h  Changes to old medications: none  Items to follow up outpatient: PCP  Physical exam at time of discharge:    Constitutional: NAD resting in bed  HEENT: EOMI	  Pulm: No respiratory distress, nonlabored breathing, on room air  Cardiovascular: HDS  Gastrointestinal: soft, TN/ND  Extremities: No edema  Derm: b/l UE circular hyperpigmented lesions (per pt Hx of allergy scar)   CNS: AOx3, No obvious focal deficits Amy Elise is a 59F with PMHx of COPD, CAD, HTN, CVA who presenting with diffuse body aches and fever found to have Pyelonephritis and bacteremic with E coli, admitted for IV abx. S/p 1 day of zosyn and 1 day of CTX, now being discharged on PO antibiotics.    Hospital course (problem-based):    #Bacteremia   Pt presented to the ED on March 8th c/o diffuse body aches, weakness, diarrhea, fever and chills for 1 week, blood cultures were obtained. Sent home on PO Cefuroxime 250 mg tablet. Was notified on March 11th of positive blood cultures x 2 growing E.coli. Afebrile since admission.   Blood culture: no growth at 24 hours x 2   - treated with 1 dose of Zosyn IV 3.375g (3/11)  - switched to Ceftriaxone 2g Q24hrs (3/12)  - Sent on PO Levaquin 750mg q24h x5d (last dose 3/17)  - f/u with PCP Dr. Josephine George on 03/17/25 at 10:15 am    #Pyelonephritis   In ED, pt denied any dysuria but presented with body aches, back pain, weakness, and diarrhea.   UA: (+) Leukocyte esterase, Bacteria and WBC  CT scan: Bilateral pyelonephritis, right greater than the left   - same as above     #Hypokalemia   K: 3.0  - Repleted with potassium chloride powder PO 40meq q2hrs 2 doses   - repeat 3.8    CHRONIC  #COPD   - c/w Advair-Diskus 250-50 1 puff BID  - c/w albuterol  - CPAP at night    #HTN  - home meds continued: Lisinopril-HCTZ 40-25mg qd    #CAD  - home meds continued: ASA 81, Atorvastatin 40mg qhs    New medications: Levaquin 750mg q24h  Changes to old medications: none  Items to follow up outpatient: PCP  Physical exam at time of discharge:    Constitutional: NAD resting in bed  HEENT: EOMI	  Pulm: No respiratory distress, nonlabored breathing, on room air  Cardiovascular: HDS  Gastrointestinal: soft, TN/ND  Extremities: No edema  Derm: b/l UE circular hyperpigmented lesions (per pt Hx of allergy scar)   CNS: AOx3, No obvious focal deficits Amy Elise is a 59F with PMHx of COPD, CAD, HTN, CVA who presenting with diffuse body aches and fever found to have Pyelonephritis and bacteremic with E coli, admitted for IV abx. S/p 1 day of zosyn and 1 day of CTX, now being discharged on PO antibiotics.    Hospital course (problem-based):    #Bacteremia   Pt presented to the ED on March 8th c/o diffuse body aches, weakness, diarrhea, fever and chills for 1 week, blood cultures were obtained. Sent home on PO Cefuroxime 250 mg tablet. Was notified on March 11th of positive blood cultures x 2 growing E.coli. Afebrile since admission.   Blood culture: no growth at 24 hours x 2   - treated with 1 dose of Zosyn IV 3.375g (3/11)  - switched to Ceftriaxone 2g Q24hrs (3/12)  - Sent on PO Levaquin 750mg q24h x4d (last dose 3/17)  - f/u with PCP Dr. Josephine George on 03/17/25 at 10:15 am    #Pyelonephritis   In ED, pt denied any dysuria but presented with body aches, back pain, weakness, and diarrhea.   UA: (+) Leukocyte esterase, Bacteria and WBC  CT scan: Bilateral pyelonephritis, right greater than the left   - same as above     #Hypokalemia   K: 3.0  - Repleted with potassium chloride powder PO 40meq q2hrs 2 doses   - repeat 3.8    CHRONIC  #COPD   - c/w Advair-Diskus 250-50 1 puff BID  - c/w albuterol  - CPAP at night    #HTN  - home meds continued: Lisinopril-HCTZ 40-25mg qd    #CAD  - home meds continued: ASA 81, Atorvastatin 40mg qhs    New medications: Levaquin 750mg q24h  Changes to old medications: none  Items to follow up outpatient: PCP  Physical exam at time of discharge:    Constitutional: NAD resting in bed  HEENT: EOMI	  Pulm: No respiratory distress, nonlabored breathing, on room air  Cardiovascular: HDS  Gastrointestinal: soft, TN/ND  Extremities: No edema  Derm: b/l UE circular hyperpigmented lesions (per pt Hx of allergy scar)   CNS: AOx3, No obvious focal deficits

## 2025-03-12 NOTE — H&P ADULT - PROBLEM SELECTOR PLAN 8
F - s/p 1L NS in ED  E - PRN  N - Reg  D - Lovenox  D - RMF Pt is an active smoker (1 pck q3 days) Pt would like to quit and willing to try nicotine while in the hospital     - Nicotine 2mg q8h PRN

## 2025-03-12 NOTE — PATIENT PROFILE ADULT - FALL HARM RISK - HARM RISK INTERVENTIONS
Assistance with ambulation/Assistance OOB with selected safe patient handling equipment/Communicate Risk of Fall with Harm to all staff/Discuss with provider need for PT consult/Monitor gait and stability/Provide patient with walking aids - walker, cane, crutches/Reinforce activity limits and safety measures with patient and family/Sit up slowly, dangle for a short time, stand at bedside before walking/Tailored Fall Risk Interventions/Use of alarms - bed, chair and/or voice tab/Visual Cue: Yellow wristband and red socks/Bed in lowest position, wheels locked, appropriate side rails in place/Call bell, personal items and telephone in reach/Instruct patient to call for assistance before getting out of bed or chair/Non-slip footwear when patient is out of bed/[place] to call system/Physically safe environment - no spills, clutter or unnecessary equipment/Purposeful Proactive Rounding/Room/bathroom lighting operational, light cord in reach

## 2025-03-12 NOTE — DIETITIAN INITIAL EVALUATION ADULT - PROBLEM SELECTOR PLAN 8
Pt is an active smoker (1 pck q3 days) Pt would like to quit and willing to try nicotine while in the hospital     - Nicotine 2mg q8h PRN

## 2025-03-12 NOTE — H&P ADULT - NSHPLABSRESULTS_GEN_ALL_CORE
< from: CT Abdomen and Pelvis w/ IV Cont (03.11.25 @ 17:53) >    IMPRESSION:  Bilateral pyelonephritis, right greater than left.        < end of copied text >    < from: Xray Chest 1 View AP/PA (03.11.25 @ 15:16) >      IMPRESSION: Lungs clear. No infiltrate pleural effusion or pneumothorax.   Unremarkable cardiac silhouette. No acute bone abnormality.      < end of copied text >    Blood Gas Venous - Lactate: 1.2 mmol/L (03.11.25 @ 18:59) Urine Microscopic-Add On (NC) (03.11.25 @ 15:09)   White Blood Cell - Urine: 3 /HPF  Red Blood Cell - Urine: 0 /HPF  Bacteria: Few /HPF  Hyaline Casts: Present  Granular Cast: Present  Squamous Epithelial Cells: Present      Historical Values  Urine Microscopic-Add On (NC) (03.11.25 @ 15:09) Urinalysis (03.11.25 @ 15:09)   Blood, Urine: Negative  Glucose Qualitative, Urine: Negative mg/dL  pH Urine: 6.0  Color: Yellow  Urine Appearance: Clear  Bilirubin: Negative  Ketone - Urine: Negative mg/dL  Specific Gravity: 1.018  Protein, Urine: Trace mg/dL  Urobilinogen: 0.2 mg/dL  Nitrite: Negative  Leukocyte Esterase Concentration: NegativeComprehensive Metabolic Panel (03.11.25 @ 15:09)   Sodium: 139 mmoL/L  Potassium: 3.8 mmol/L  Chloride: 102 mmol/L  Carbon Dioxide: 27 mmol/L  Anion Gap: 10 mmoL/L  Blood Urea Nitrogen: 21 mg/dL  Creatinine: 1.03 mg/dL  Glucose: 144 mg/dL  Calcium: 10.0 mg/dL  Protein Total: 7.9 g/dL  Albumin: 3.0 g/dL  Bilirubin Total: 0.3: Use of this assay is not recommended for patients undergoing treatment   with eltrombopag due to the potential for falsely elevated results mg/dL

## 2025-03-12 NOTE — DISCHARGE NOTE PROVIDER - NSDCMRMEDTOKEN_GEN_ALL_CORE_FT
acetaminophen 325 mg oral tablet: 2 tab(s) orally every 6 hours, As needed, Moderate Pain (4 - 6)  acetaminophen-oxyCODONE 325 mg-5 mg oral tablet: 1 tab(s) orally every 6 hours, As Needed - for severe pain MDD:4 tabs  Advair Diskus 250 mcg-50 mcg inhalation powder: 1 puff(s) inhaled 2 times a day  albuterol:     aspirin 81 mg oral delayed release tablet: 1 tab(s) orally once a day  atorvastatin 40 mg oral tablet: 1 tab(s) orally once a day (at bedtime)  diazepam 5 mg oral tablet: 2 tab(s) orally once a day (at bedtime), As Needed  - for muscle spasm MDD:10mg  hydroCHLOROthiazide 25 mg oral tablet: 1 tab(s) orally once a day  lisinopril 40 mg oral tablet: 1 tab(s) orally once a day  nebulizer machine: Please dispense one nebulizer machine  Use with albuterol solution for nebulization every 4-6 hours PRN for shortness of breath/wheezing  Proventil HFA 90 mcg/inh inhalation aerosol: 2 puff(s) inhaled every 6 hours   QUEtiapine 400 mg oral tablet, extended release: 1 tab(s) orally once a day (in the evening)    acetaminophen 325 mg oral tablet: 2 tab(s) orally every 6 hours, As needed, Moderate Pain (4 - 6)  acetaminophen-oxyCODONE 325 mg-5 mg oral tablet: 1 tab(s) orally every 6 hours, As Needed - for severe pain MDD:4 tabs  Advair Diskus 250 mcg-50 mcg inhalation powder: 1 puff(s) inhaled 2 times a day  albuterol:     aspirin 81 mg oral delayed release tablet: 1 tab(s) orally once a day  atorvastatin 40 mg oral tablet: 1 tab(s) orally once a day (at bedtime)  diazepam 5 mg oral tablet: 2 tab(s) orally once a day (at bedtime), As Needed  - for muscle spasm MDD:10mg  hydroCHLOROthiazide 25 mg oral tablet: 1 tab(s) orally once a day  levoFLOXacin 750 mg oral tablet: 1 tab(s) orally every 24 hours Take 1 tablet daily starting 3/14 until complete  lisinopril 40 mg oral tablet: 1 tab(s) orally once a day  nebulizer machine: Please dispense one nebulizer machine  Use with albuterol solution for nebulization every 4-6 hours PRN for shortness of breath/wheezing  Proventil HFA 90 mcg/inh inhalation aerosol: 2 puff(s) inhaled every 6 hours   QUEtiapine 400 mg oral tablet, extended release: 1 tab(s) orally once a day (in the evening)

## 2025-03-12 NOTE — DIETITIAN INITIAL EVALUATION ADULT - ORAL INTAKE PTA/DIET HISTORY
Pt reported lemon allergy - denied citrus allergy. Pt reported low appetite and PO appetite x months - denied following any specific diet PTA.

## 2025-03-12 NOTE — H&P ADULT - PROBLEM SELECTOR PLAN 7
Pt is an active smoker (1 pck q3 days) Pt would like to quit and willing to try nicotine while in the hospital     - Nicotine 2mg q8h PRN Pt reports chronic MSK pain (back/neck pain/shoulder) 2/2 degenerative joint disease. At baseline pt ambulates with rolling walker/cane, has 6 days of HHA at home. Per pt on home oxycodone 20mg PRN, Seroquel 400mg qhs and Diazepam 0.5mg BID. On exam pt not complaining of any acute pain at this time     - I stop/Med rec   - PT

## 2025-03-12 NOTE — H&P ADULT - PROBLEM SELECTOR PLAN 1
Pt presented to ED 3 days prior for diffuse body aches, weakness, diarrhea, fever, and chills was sent home from ED with PO Cefuroxime. Pt was then called back for positive blood cultures x2  growing E. coli.  Patient reports persistent generalized malaise, however the fevers and chills have improved. Pt reports diarrhea x 2 wks which has started to resolve. On exam afebrile, HDS. Bcx growing E-coli sen to CTX, Pt received Zosyn x2, will continue with CTX    - f/u repeat Bcx   - Start CTX 2g IV q24   -

## 2025-03-12 NOTE — H&P ADULT - PROBLEM SELECTOR PLAN 4
Pt on Lisinopril-HCTZ 40-25mg qd    - c/w home meds Pt is a current smoker with COPD on home Advair-Diskus 250-50 1puff BID, Albuterol INH and Neb PRN. and uses CPAP at home however currently CPAP is getting serviced. On exam lungs CTABL.     - c/w  Advair-Diskus 250-50 1puff BID  - c/w duonebs q8h PRN   - CPAP at night   - SpO2 goal 88-92%

## 2025-03-12 NOTE — PHYSICAL THERAPY INITIAL EVALUATION ADULT - GENERAL OBSERVATIONS, REHAB EVAL
Pt cleared for PT by JONATHAN Bravo. Pt received supine in bed, +heplock, on RA, in NAD, agreeable to PT.

## 2025-03-12 NOTE — DIETITIAN INITIAL EVALUATION ADULT - PERTINENT MEDS FT
MEDICATIONS  (STANDING):  aspirin enteric coated 81 milliGRAM(s) Oral daily  atorvastatin 40 milliGRAM(s) Oral at bedtime  cefTRIAXone   IVPB 2000 milliGRAM(s) IV Intermittent every 24 hours  enoxaparin Injectable 40 milliGRAM(s) SubCutaneous every 24 hours  fluticasone propionate/ salmeterol 250-50 MICROgram(s) Diskus 1 Dose(s) Inhalation two times a day  hydrochlorothiazide 25 milliGRAM(s) Oral every 24 hours  lisinopril 40 milliGRAM(s) Oral every 24 hours    MEDICATIONS  (PRN):  acetaminophen     Tablet .. 650 milliGRAM(s) Oral every 6 hours PRN Temp greater or equal to 38C (100.4F), Mild Pain (1 - 3)  albuterol/ipratropium for Nebulization 3 milliLiter(s) Nebulizer every 8 hours PRN Shortness of Breath and/or Wheezing  aluminum hydroxide/magnesium hydroxide/simethicone Suspension 30 milliLiter(s) Oral every 4 hours PRN Dyspepsia  melatonin 3 milliGRAM(s) Oral at bedtime PRN Insomnia  nicotine  Polacrilex Gum 2 milliGRAM(s) Oral every 8 hours PRN Smoking Cessation  ondansetron Injectable 4 milliGRAM(s) IV Push every 8 hours PRN Nausea and/or Vomiting

## 2025-03-12 NOTE — DIETITIAN INITIAL EVALUATION ADULT - PROBLEM SELECTOR PLAN 4
Pt is a current smoker with COPD on home Advair-Diskus 250-50 1puff BID, Albuterol INH and Neb PRN. and uses CPAP at home however currently CPAP is getting serviced. On exam lungs CTABL.     - c/w  Advair-Diskus 250-50 1puff BID  - c/w duonebs q8h PRN   - CPAP at night   - SpO2 goal 88-92%

## 2025-03-12 NOTE — PATIENT PROFILE ADULT - FUNCTIONAL ASSESSMENT - BASIC MOBILITY 2.
Goal Outcome Evaluation:           Progress: improving  Outcome Evaluation: up ad kimberly no falls or injuries cont to monitor   no resp difficulty noted .          4 = No assist / stand by assistance

## 2025-03-12 NOTE — DIETITIAN INITIAL EVALUATION ADULT - ADD RECOMMEND
1. Continue with Regular diet - Encourage adequate PO and protein intake - Honor food preferences, as medically able 2. Consider oral nutrition supplement if PO <50% 3. Recommend MVI for general nutrient coverage 4. Recommend thiamin 100 mg/d in setting of malnutrition 5. Monitor lytes, specifically Mg and Phos for refeeding, replete prn 6. Continue with current bowel regimen, prn 7. Appreciate weekly weight trends 8. Ongoing diet education

## 2025-03-12 NOTE — DISCHARGE NOTE PROVIDER - PROVIDER TOKENS
FREE:[LAST:[Sikhism],FIRST:[Ali],PHONE:[phone]],FAX:[(   )    -],ADDRESS:[[address],[facility]],SCHEDULEDAPPT:[03/17/2025],SCHEDULEDAPPTTIME:[10:15 AM],ESTABLISHEDPATIENT:[T]]

## 2025-03-12 NOTE — H&P ADULT - PROBLEM SELECTOR PLAN 6
Pt reports chronic MSK pain (back/neck pain/shoulder) 2/2 degenerative joint disease. At baseline pt ambulates with rolling walker/cane, has 6 days of HHA at home. Per pt on home oxycodone 20mg PRN, Seroquel 400mg qhs and Diazepam 0.5mg BID. On exam pt not complaining of any acute pain at this time     - I stop/Med rec   - PT Pt with CAD, per pt prior MI with stents in 2001. On home ASA 81, Atorvastatin 40mg qhs     - c/w ASA 81, Atorvastatin 40mg qhs

## 2025-03-12 NOTE — PHYSICAL THERAPY INITIAL EVALUATION ADULT - ADDITIONAL COMMENTS
Pt reports living in an elevator building with no YOLANDA with her 16y/o children. Pt reprots amy independent PTA and owns a rollator, cane and switches back and forth between using them for assistance. Pt has HHA Monday-Satuday 9-4.

## 2025-03-12 NOTE — DIETITIAN INITIAL EVALUATION ADULT - PROBLEM SELECTOR PLAN 6
Pt with CAD, per pt prior MI with stents in 2001. On home ASA 81, Atorvastatin 40mg qhs     - c/w ASA 81, Atorvastatin 40mg qhs

## 2025-03-12 NOTE — H&P ADULT - PROBLEM SELECTOR PLAN 2
Pt presented to ED 3 days prior for diffuse body aches, back pain, weakness, diarrhea, fever. Pt denies dysuria. Pt was sent home from ED with PO Cefuroxime. On 3/8 UC + LE/Bact/WBC. Pt was then called back for positive blood cultures x2  growing E. coli. In the ED CT showing bilateral pyelonephritis, right greater than left, no hydro or abbesses. Renal funtion wnl, On exam pt w/o CVA tenderness, afebrile, HDS.     - c/w Abx as above   - f/u Ucx

## 2025-03-12 NOTE — DISCHARGE NOTE PROVIDER - ATTENDING DISCHARGE PHYSICAL EXAMINATION:
Gen: NAD, dressed in regular clothes and sitting in chair   HEENT: EOMI, PERRL, no scleral icterus  CV: normal S1 and S2  Lungs: CTABL, normal respiratory effort on RA  Ab: soft, NT, ND, normal BS  Ext: no LE edema   Neuro: A&O x 3, no focal deficits

## 2025-03-12 NOTE — H&P ADULT - PROBLEM SELECTOR PROBLEM 4
CAD (coronary artery disease) HTN (hypertension) Chronic bronchitis with COPD (chronic obstructive pulmonary disease)

## 2025-03-12 NOTE — H&P ADULT - ATTENDING COMMENTS
59-year-old female with a PMHx of COPD, CAD and CVA who presented with initially presented on 3/8 with weakness, fever and chills (discharged with PO Abx) who re-presented with E. coli bacteremia 2/2 pyelonephritis.  Continue with CTX based on E. coli sensitivities and follow up surveillance BCx.      Rest of plan as per resident note.

## 2025-03-12 NOTE — DISCHARGE NOTE PROVIDER - NSDCCPCAREPLAN_GEN_ALL_CORE_FT
PRINCIPAL DISCHARGE DIAGNOSIS  Diagnosis: Pyelonephritis  Assessment and Plan of Treatment: You were found to have pyelonephritis which is an infection of your kidneys. This infection is caused by a urinary tract infection, "UTI", which occurs when the bacteria from your bladder travel even higher, up into the kidneys. The symptoms of a bladder infection include pain or a burning feeling when you urinate, the need to urinate often, the need to urinate suddenly or in a hurry, blood in the urine. Signs that an infection has spread to the kidneys include fever, back pain, or nausea/vomiting. It is important that you take your antibiotics as prescribed and to completion to properly treat your kidney infection and prevent antibiotic resistance. If you have recurrence of symptoms, please seek medical attention.  - Continue your Ciprofloxacin ___ for 7 days  - Avoid any trenuous activity or heavy lifting due to potential side effect of tendonitis with this antibiotics, commonly in the achilles tendon. If you have any severe tendon pain specifically behind the ankle, stop the antibiotics and come to the ED.      SECONDARY DISCHARGE DIAGNOSES  Diagnosis: Bacteremia  Assessment and Plan of Treatment: Bacteremia occurs when you have the presence of bacteria in your bloodstream. In your case it is due to your kidney infection. It is important to finish your course of antibiotics to treat this infection. If you have any worsening symptoms such as high fevers, chills, rapid breathing or dizziness.   - Continue your Ciprofloxacin ____ for 7 days.     PRINCIPAL DISCHARGE DIAGNOSIS  Diagnosis: Pyelonephritis  Assessment and Plan of Treatment: You were found to have pyelonephritis which is an infection of your kidneys. This infection is caused by a urinary tract infection, "UTI", which occurs when the bacteria from your bladder travel even higher, up into the kidneys. The symptoms of a bladder infection include pain or a burning feeling when you urinate, the need to urinate often, the need to urinate suddenly or in a hurry, blood in the urine. Signs that an infection has spread to the kidneys include fever, back pain, or nausea/vomiting. It is important that you take your antibiotics as prescribed and to completion to properly treat your kidney infection and prevent antibiotic resistance. If you have recurrence of symptoms, please seek medical attention.  - Continue your Levaquin 750mg every 24 hours for 7 days  - Avoid any trenuous activity or heavy lifting due to potential side effect of tendonitis with this antibiotics, commonly in the achilles tendon. If you have any severe tendon pain specifically behind the ankle, stop the antibiotics and come to the ED.      SECONDARY DISCHARGE DIAGNOSES  Diagnosis: Bacteremia  Assessment and Plan of Treatment: Bacteremia occurs when you have the presence of bacteria in your bloodstream. In your case it is due to your kidney infection. It is important to finish your course of antibiotics to treat this infection. If you have any worsening symptoms such as high fevers, chills, rapid breathing or dizziness.   - Continue your Levaquin 750mg every 24 hours for 7 days     PRINCIPAL DISCHARGE DIAGNOSIS  Diagnosis: Pyelonephritis  Assessment and Plan of Treatment: You were found to have pyelonephritis which is an infection of your kidneys. This infection is caused by a urinary tract infection, "UTI", which occurs when the bacteria from your bladder travel even higher, up into the kidneys. The symptoms of a bladder infection include pain or a burning feeling when you urinate, the need to urinate often, the need to urinate suddenly or in a hurry, blood in the urine. Signs that an infection has spread to the kidneys include fever, back pain, or nausea/vomiting. It is important that you take your antibiotics as prescribed and to completion to properly treat your kidney infection and prevent antibiotic resistance. If you have recurrence of symptoms, please seek medical attention.  - f/u with PCP Dr. Josephine George on 03/17/25 at 10:15 am  - Continue your Levaquin 750mg every 24 hours for 7 days  - Avoid any trenuous activity or heavy lifting due to potential side effect of tendonitis with this antibiotics, commonly in the achilles tendon. If you have any severe tendon pain specifically behind the ankle, stop the antibiotics and come to the ED.      SECONDARY DISCHARGE DIAGNOSES  Diagnosis: Bacteremia  Assessment and Plan of Treatment: Bacteremia occurs when you have the presence of bacteria in your bloodstream. In your case it is due to your kidney infection. It is important to finish your course of antibiotics to treat this infection. If you have any worsening symptoms such as high fevers, chills, rapid breathing or dizziness.   - Continue your Levaquin 750mg every 24 hours for 7 days     PRINCIPAL DISCHARGE DIAGNOSIS  Diagnosis: Pyelonephritis  Assessment and Plan of Treatment: You were found to have pyelonephritis which is an infection of your kidneys. This infection is caused by a urinary tract infection, "UTI", which occurs when the bacteria from your bladder travel even higher, up into the kidneys. The symptoms of a bladder infection include pain or a burning feeling when you urinate, the need to urinate often, the need to urinate suddenly or in a hurry, blood in the urine. Signs that an infection has spread to the kidneys include fever, back pain, or nausea/vomiting. It is important that you take your antibiotics as prescribed and to completion to properly treat your kidney infection and prevent antibiotic resistance. If you have recurrence of symptoms, please seek medical attention.  - f/u with PCP Dr. Josephine George on 03/17/25 at 10:15 am  - Continue your Levaquin 750mg every 24 hours for 7 days (start taking 1 tablet every day starting 3/14 until complete)  - Avoid any trenuous activity or heavy lifting due to potential side effect of tendonitis with this antibiotics, commonly in the achilles tendon. If you have any severe tendon pain specifically behind the ankle, stop the antibiotics and come to the ED.      SECONDARY DISCHARGE DIAGNOSES  Diagnosis: Bacteremia  Assessment and Plan of Treatment: Bacteremia occurs when you have the presence of bacteria in your bloodstream. In your case it is due to your kidney infection. It is important to finish your course of antibiotics to treat this infection. If you have any worsening symptoms such as high fevers, chills, rapid breathing or dizziness.   - Continue your Levaquin 750mg every 24 hours for 7 days

## 2025-03-12 NOTE — H&P ADULT - HISTORY OF PRESENT ILLNESS
Pt is a 59F with PMHx of COPD, CAD, CVA who presenting to ED 3 days prior for diffuse body aches, weakness, diarrhea, fever, and chills was sent home from ED with PO Cefuroxime. Pt was then called back for positive blood cultures x2  growing E. coli.  Patient reports persistent generalized malaise with resolution of fever and chills.  States she has been having intermittent diarrhea for several months.  No blood in stool.  No nausea or vomiting     ED course  Vitals: T 98, HR 94->71, BP 99/69, RR 16, SpO2 95% RA   Labs: WBC 7.4, Hgb 14, plt  243, Na 139, K 3.8, BUN/Cr 21/1.03, Glu 133, Alb 3, AST/ALT 58/69, Lactate 2.3->1.2, UA+ from 3/8/25, Bcx from 3/8/35 growing Ecoli sen to zosyn   EKG: NSR non ischemic Qtc 460   Images: CXR clear, CT AP Bilateral pyelonephritis, right greater than left.  Interventions: Zosyn 3.375mg IV x1, 1L NS IVF, Tylenol 650mg PO x1  Consults none Simple: Patient demonstrates quick and easy understanding

## 2025-03-12 NOTE — H&P ADULT - PROBLEM SELECTOR PLAN 5
Pt with CAD, per pt prior MI with stents in 2001. On home ASA 81, Atorvastatin 40mg qhs     - c/w ASA 81, Atorvastatin 40mg qhs Pt on Lisinopril-HCTZ 40-25mg qd    - c/w home meds

## 2025-03-12 NOTE — H&P ADULT - ASSESSMENT
Pt is a 59F with PMHx of COPD, CAD, CVA who presenting with diffuse body aches and fever found to have Pyelonephritis and bacteremic with Ecoli, admitted for IV abx

## 2025-03-12 NOTE — DISCHARGE NOTE PROVIDER - CARE PROVIDER_API CALL
Josephine George  [facility]  [address] [place] [id number]  Phone: [phone]  Fax: (   )    -  Established Patient  Scheduled Appointment: 03/17/2025 10:15 AM

## 2025-03-12 NOTE — DIETITIAN INITIAL EVALUATION ADULT - PERTINENT LABORATORY DATA
03-12    141  |  102  |  13  ----------------------------<  167[H]  3.0[L]   |  27  |  0.72    Ca    9.2      12 Mar 2025 05:30  Phos  3.6     03-12  Mg     1.9     03-12    TPro  7.9  /  Alb  3.0[L]  /  TBili  0.3  /  DBili  x   /  AST  58[H]  /  ALT  69[H]  /  AlkPhos  68  03-11

## 2025-03-12 NOTE — DIETITIAN INITIAL EVALUATION ADULT - OTHER CALCULATIONS
*Using +10% ideal body weight (115.5 pounds) as pt is >120% ideal body weight. Needs adjusted for malnutrition, COPD. ideal body weight: 105 pounds; % ideal body weight: 154%

## 2025-03-12 NOTE — H&P ADULT - NSHPPHYSICALEXAM_GEN_ALL_CORE
Constitutional: NAD resting in bed  HEENT: EOMI	  Pulm: No respiratory distress, nonlabored breathing, on room air  Cardiovascular: HDS  Gastrointestinal: soft, TN/ND  Extremities: No edema  Derm: No obvious rashes  CNS: AOx3, No obvious focal deficits Constitutional: NAD resting in bed  HEENT: EOMI	  Pulm: No respiratory distress, nonlabored breathing, on room air  Cardiovascular: HDS  Gastrointestinal: soft, TN/ND  Extremities: No edema  Derm: b/l UE circular hyperpigmented lesions (per pt Hx of allergy scar)   CNS: AOx3, No obvious focal deficits

## 2025-03-12 NOTE — PHYSICAL THERAPY INITIAL EVALUATION ADULT - PERTINENT HX OF CURRENT PROBLEM, REHAB EVAL
Pt is a 59F with PMHx of COPD, CAD, CVA who presenting to ED 3 days prior for diffuse body aches, weakness, diarrhea, fever, and chills was sent home from ED with PO Cefuroxime. Pt was then called back for positive blood cultures x2  growing E. coli.  Patient reports persistent generalized malaise with resolution of fever and chills.  States she has been having intermittent diarrhea for several months.  No blood in stool.  No nausea or vomiting

## 2025-03-13 ENCOUNTER — TRANSCRIPTION ENCOUNTER (OUTPATIENT)
Age: 60
End: 2025-03-13

## 2025-03-13 VITALS
RESPIRATION RATE: 16 BRPM | DIASTOLIC BLOOD PRESSURE: 80 MMHG | OXYGEN SATURATION: 98 % | HEART RATE: 63 BPM | TEMPERATURE: 97 F | SYSTOLIC BLOOD PRESSURE: 134 MMHG

## 2025-03-13 LAB
ANION GAP SERPL CALC-SCNC: 13 MMOL/L — SIGNIFICANT CHANGE UP (ref 5–17)
BUN SERPL-MCNC: 14 MG/DL — SIGNIFICANT CHANGE UP (ref 7–23)
CALCIUM SERPL-MCNC: 9.7 MG/DL — SIGNIFICANT CHANGE UP (ref 8.4–10.5)
CHLORIDE SERPL-SCNC: 99 MMOL/L — SIGNIFICANT CHANGE UP (ref 96–108)
CO2 SERPL-SCNC: 28 MMOL/L — SIGNIFICANT CHANGE UP (ref 22–31)
CREAT SERPL-MCNC: 0.63 MG/DL — SIGNIFICANT CHANGE UP (ref 0.5–1.3)
EGFR: 102 ML/MIN/1.73M2 — SIGNIFICANT CHANGE UP
EGFR: 102 ML/MIN/1.73M2 — SIGNIFICANT CHANGE UP
GLUCOSE SERPL-MCNC: 105 MG/DL — HIGH (ref 70–99)
POTASSIUM SERPL-MCNC: 3.8 MMOL/L — SIGNIFICANT CHANGE UP (ref 3.5–5.3)
POTASSIUM SERPL-SCNC: 3.8 MMOL/L — SIGNIFICANT CHANGE UP (ref 3.5–5.3)
SODIUM SERPL-SCNC: 140 MMOL/L — SIGNIFICANT CHANGE UP (ref 135–145)

## 2025-03-13 PROCEDURE — 99239 HOSP IP/OBS DSCHRG MGMT >30: CPT | Mod: GC

## 2025-03-13 PROCEDURE — 94640 AIRWAY INHALATION TREATMENT: CPT

## 2025-03-13 PROCEDURE — 71045 X-RAY EXAM CHEST 1 VIEW: CPT

## 2025-03-13 PROCEDURE — 96374 THER/PROPH/DIAG INJ IV PUSH: CPT

## 2025-03-13 PROCEDURE — 97161 PT EVAL LOW COMPLEX 20 MIN: CPT

## 2025-03-13 PROCEDURE — 85610 PROTHROMBIN TIME: CPT

## 2025-03-13 PROCEDURE — 87086 URINE CULTURE/COLONY COUNT: CPT

## 2025-03-13 PROCEDURE — 83735 ASSAY OF MAGNESIUM: CPT

## 2025-03-13 PROCEDURE — 85730 THROMBOPLASTIN TIME PARTIAL: CPT

## 2025-03-13 PROCEDURE — 85025 COMPLETE CBC W/AUTO DIFF WBC: CPT

## 2025-03-13 PROCEDURE — 81001 URINALYSIS AUTO W/SCOPE: CPT

## 2025-03-13 PROCEDURE — 99285 EMERGENCY DEPT VISIT HI MDM: CPT

## 2025-03-13 PROCEDURE — 74177 CT ABD & PELVIS W/CONTRAST: CPT | Mod: MC

## 2025-03-13 PROCEDURE — 83605 ASSAY OF LACTIC ACID: CPT

## 2025-03-13 PROCEDURE — 36415 COLL VENOUS BLD VENIPUNCTURE: CPT

## 2025-03-13 PROCEDURE — 80053 COMPREHEN METABOLIC PANEL: CPT

## 2025-03-13 PROCEDURE — 84484 ASSAY OF TROPONIN QUANT: CPT

## 2025-03-13 PROCEDURE — 80048 BASIC METABOLIC PNL TOTAL CA: CPT

## 2025-03-13 PROCEDURE — 84100 ASSAY OF PHOSPHORUS: CPT

## 2025-03-13 PROCEDURE — 87040 BLOOD CULTURE FOR BACTERIA: CPT

## 2025-03-13 RX ORDER — LEVOFLOXACIN 25 MG/ML
1 SOLUTION ORAL
Qty: 4 | Refills: 0
Start: 2025-03-13 | End: 2025-03-16

## 2025-03-13 RX ADMIN — CEFTRIAXONE 100 MILLIGRAM(S): 500 INJECTION, POWDER, FOR SOLUTION INTRAMUSCULAR; INTRAVENOUS at 06:44

## 2025-03-13 NOTE — PROGRESS NOTE ADULT - SUBJECTIVE AND OBJECTIVE BOX
INTERVAL/OVERNIGHT EVENTS: None    SUBJECTIVE:  Patient seen and examined at bedside, comfortable, NAD. Denied fever, chest pain, dyspnea, abdominal pain.     Vital Signs Last 12 Hrs  T(F): 98.2 (03-12-25 @ 21:00), Max: 98.2 (03-12-25 @ 21:00)  HR: 72 (03-12-25 @ 21:00) (72 - 72)  BP: 117/69 (03-12-25 @ 21:00) (117/69 - 117/69)  BP(mean): --  RR: 18 (03-12-25 @ 21:00) (18 - 18)  SpO2: 97% (03-12-25 @ 21:00) (97% - 97%)  I&O's Summary    PHYSICAL EXAM:  General: NAD  HEENT: PERRL, EOM intact, sclera anicteric, MMM  Cardiovascular: RRR; no MRG;   Respiratory: CTAB; no WRR  GI/: soft; NTND; BS x4  Extremities: WWP; 2+ peripheral pulses bilaterally; no LE edema  Skin: normal color & turgor; no rash  Neurologic: aox3; no focal deficits    LABS:                        12.5   6.16  )-----------( 239      ( 12 Mar 2025 05:30 )             36.9     03-12    141  |  102  |  13  ----------------------------<  167[H]  3.0[L]   |  27  |  0.72    Ca    9.2      12 Mar 2025 05:30  Phos  3.6     03-12  Mg     1.9     03-12    TPro  7.9  /  Alb  3.0[L]  /  TBili  0.3  /  DBili  x   /  AST  58[H]  /  ALT  69[H]  /  AlkPhos  68  03-11    PT/INR - ( 11 Mar 2025 15:09 )   PT: 12.1 sec;   INR: 1.04          PTT - ( 11 Mar 2025 15:09 )  PTT:27.4 sec  Urinalysis Basic - ( 12 Mar 2025 05:30 )    Color: x / Appearance: x / SG: x / pH: x  Gluc: 167 mg/dL / Ketone: x  / Bili: x / Urobili: x   Blood: x / Protein: x / Nitrite: x   Leuk Esterase: x / RBC: x / WBC x   Sq Epi: x / Non Sq Epi: x / Bacteria: x    RADIOLOGY & ADDITIONAL TESTS:    MEDICATIONS  (STANDING):  aspirin enteric coated 81 milliGRAM(s) Oral daily  atorvastatin 40 milliGRAM(s) Oral at bedtime  cefTRIAXone   IVPB 2000 milliGRAM(s) IV Intermittent every 24 hours  enoxaparin Injectable 40 milliGRAM(s) SubCutaneous every 24 hours  fluticasone propionate/ salmeterol 250-50 MICROgram(s) Diskus 1 Dose(s) Inhalation two times a day  hydrochlorothiazide 25 milliGRAM(s) Oral every 24 hours  lisinopril 40 milliGRAM(s) Oral every 24 hours  multivitamin 1 Tablet(s) Oral daily  thiamine 100 milliGRAM(s) Oral every 24 hours    MEDICATIONS  (PRN):  acetaminophen     Tablet .. 650 milliGRAM(s) Oral every 6 hours PRN Temp greater or equal to 38C (100.4F), Mild Pain (1 - 3)  albuterol/ipratropium for Nebulization 3 milliLiter(s) Nebulizer every 8 hours PRN Shortness of Breath and/or Wheezing  aluminum hydroxide/magnesium hydroxide/simethicone Suspension 30 milliLiter(s) Oral every 4 hours PRN Dyspepsia  melatonin 3 milliGRAM(s) Oral at bedtime PRN Insomnia  nicotine  Polacrilex Gum 2 milliGRAM(s) Oral every 8 hours PRN Smoking Cessation  ondansetron Injectable 4 milliGRAM(s) IV Push every 8 hours PRN Nausea and/or Vomiting   INTERVAL/OVERNIGHT EVENTS: None    SUBJECTIVE:  Patient seen and examined at bedside, comfortable, NAD. She denies any acute complaints and states that she wants to go home at the earliest.    Vital Signs Last 12 Hrs  T(F): 98.2 (03-12-25 @ 21:00), Max: 98.2 (03-12-25 @ 21:00)  HR: 72 (03-12-25 @ 21:00) (72 - 72)  BP: 117/69 (03-12-25 @ 21:00) (117/69 - 117/69)  BP(mean): --  RR: 18 (03-12-25 @ 21:00) (18 - 18)  SpO2: 97% (03-12-25 @ 21:00) (97% - 97%)  I&O's Summary    PHYSICAL EXAM:  General: NAD, sitting comfortably in bed  HEENT: PERRL, EOM intact, sclera anicteric, MMM  Cardiovascular: RRR; no MRG  Respiratory: CTAB; no WRR  GI/: soft; NTND; BS x4  Extremities: WWP; 2+ peripheral pulses bilaterally; no LE edema  Skin: normal color & turgor; no rash noted  Neurologic: aox3; no focal deficits    LABS:                        12.5   6.16  )-----------( 239      ( 12 Mar 2025 05:30 )             36.9     03-12    141  |  102  |  13  ----------------------------<  167[H]  3.0[L]   |  27  |  0.72    Ca    9.2      12 Mar 2025 05:30  Phos  3.6     03-12  Mg     1.9     03-12    TPro  7.9  /  Alb  3.0[L]  /  TBili  0.3  /  DBili  x   /  AST  58[H]  /  ALT  69[H]  /  AlkPhos  68  03-11    PT/INR - ( 11 Mar 2025 15:09 )   PT: 12.1 sec;   INR: 1.04          PTT - ( 11 Mar 2025 15:09 )  PTT:27.4 sec  Urinalysis Basic - ( 12 Mar 2025 05:30 )    Color: x / Appearance: x / SG: x / pH: x  Gluc: 167 mg/dL / Ketone: x  / Bili: x / Urobili: x   Blood: x / Protein: x / Nitrite: x   Leuk Esterase: x / RBC: x / WBC x   Sq Epi: x / Non Sq Epi: x / Bacteria: x    RADIOLOGY & ADDITIONAL TESTS:    MEDICATIONS  (STANDING):  aspirin enteric coated 81 milliGRAM(s) Oral daily  atorvastatin 40 milliGRAM(s) Oral at bedtime  cefTRIAXone   IVPB 2000 milliGRAM(s) IV Intermittent every 24 hours  enoxaparin Injectable 40 milliGRAM(s) SubCutaneous every 24 hours  fluticasone propionate/ salmeterol 250-50 MICROgram(s) Diskus 1 Dose(s) Inhalation two times a day  hydrochlorothiazide 25 milliGRAM(s) Oral every 24 hours  lisinopril 40 milliGRAM(s) Oral every 24 hours  multivitamin 1 Tablet(s) Oral daily  thiamine 100 milliGRAM(s) Oral every 24 hours    MEDICATIONS  (PRN):  acetaminophen     Tablet .. 650 milliGRAM(s) Oral every 6 hours PRN Temp greater or equal to 38C (100.4F), Mild Pain (1 - 3)  albuterol/ipratropium for Nebulization 3 milliLiter(s) Nebulizer every 8 hours PRN Shortness of Breath and/or Wheezing  aluminum hydroxide/magnesium hydroxide/simethicone Suspension 30 milliLiter(s) Oral every 4 hours PRN Dyspepsia  melatonin 3 milliGRAM(s) Oral at bedtime PRN Insomnia  nicotine  Polacrilex Gum 2 milliGRAM(s) Oral every 8 hours PRN Smoking Cessation  ondansetron Injectable 4 milliGRAM(s) IV Push every 8 hours PRN Nausea and/or Vomiting   INTERVAL/OVERNIGHT EVENTS: None    SUBJECTIVE:  Patient seen and examined at bedside, comfortable, NAD. She denies any acute complaints and states that she wants to go home at the earliest.    Vital Signs Last 12 Hrs  T(F): 98.2 (03-12-25 @ 21:00), Max: 98.2 (03-12-25 @ 21:00)  HR: 72 (03-12-25 @ 21:00) (72 - 72)  BP: 117/69 (03-12-25 @ 21:00) (117/69 - 117/69)  BP(mean): --  RR: 18 (03-12-25 @ 21:00) (18 - 18)  SpO2: 97% (03-12-25 @ 21:00) (97% - 97%)  I&O's Summary    PHYSICAL EXAM:  Constitutional: NAD resting in bed  HEENT: EOMI	  Pulm: No respiratory distress, nonlabored breathing, on room air  Cardiovascular: HDS  Gastrointestinal: soft, TN/ND  Extremities: No edema  Derm: b/l UE circular hyperpigmented lesions (per pt Hx of allergy scar)   CNS: AOx3, No obvious focal deficits    LABS:                        12.5   6.16  )-----------( 239      ( 12 Mar 2025 05:30 )             36.9     03-12    141  |  102  |  13  ----------------------------<  167[H]  3.0[L]   |  27  |  0.72    Ca    9.2      12 Mar 2025 05:30  Phos  3.6     03-12  Mg     1.9     03-12    TPro  7.9  /  Alb  3.0[L]  /  TBili  0.3  /  DBili  x   /  AST  58[H]  /  ALT  69[H]  /  AlkPhos  68  03-11    PT/INR - ( 11 Mar 2025 15:09 )   PT: 12.1 sec;   INR: 1.04          PTT - ( 11 Mar 2025 15:09 )  PTT:27.4 sec  Urinalysis Basic - ( 12 Mar 2025 05:30 )    Color: x / Appearance: x / SG: x / pH: x  Gluc: 167 mg/dL / Ketone: x  / Bili: x / Urobili: x   Blood: x / Protein: x / Nitrite: x   Leuk Esterase: x / RBC: x / WBC x   Sq Epi: x / Non Sq Epi: x / Bacteria: x    RADIOLOGY & ADDITIONAL TESTS:    MEDICATIONS  (STANDING):  aspirin enteric coated 81 milliGRAM(s) Oral daily  atorvastatin 40 milliGRAM(s) Oral at bedtime  cefTRIAXone   IVPB 2000 milliGRAM(s) IV Intermittent every 24 hours  enoxaparin Injectable 40 milliGRAM(s) SubCutaneous every 24 hours  fluticasone propionate/ salmeterol 250-50 MICROgram(s) Diskus 1 Dose(s) Inhalation two times a day  hydrochlorothiazide 25 milliGRAM(s) Oral every 24 hours  lisinopril 40 milliGRAM(s) Oral every 24 hours  multivitamin 1 Tablet(s) Oral daily  thiamine 100 milliGRAM(s) Oral every 24 hours    MEDICATIONS  (PRN):  acetaminophen     Tablet .. 650 milliGRAM(s) Oral every 6 hours PRN Temp greater or equal to 38C (100.4F), Mild Pain (1 - 3)  albuterol/ipratropium for Nebulization 3 milliLiter(s) Nebulizer every 8 hours PRN Shortness of Breath and/or Wheezing  aluminum hydroxide/magnesium hydroxide/simethicone Suspension 30 milliLiter(s) Oral every 4 hours PRN Dyspepsia  melatonin 3 milliGRAM(s) Oral at bedtime PRN Insomnia  nicotine  Polacrilex Gum 2 milliGRAM(s) Oral every 8 hours PRN Smoking Cessation  ondansetron Injectable 4 milliGRAM(s) IV Push every 8 hours PRN Nausea and/or Vomiting

## 2025-03-13 NOTE — CHART NOTE - NSCHARTNOTEFT_GEN_A_CORE
Discussed with patient who their healthcare proxy would be if she was unable to make decisions and she stated it would be her son Sarwat Antoine. She endorses wanting to be full code and all interventions to be made including intubation and chest compressions.
The patient wishes to leave against medical advice. I have discussed the risks, benefits and alternatives (including the possibility of worsening of disease, pain, permanent disability, and/or death) with the patient. She understands that she needs to wait for blood culture results before she can be safely discharged but she states that she needs to leave before the results come back. The patient voices understanding of these risks, benefits, and alternatives and still wishes to sign out against medical advice.  The patient is awake, alert, oriented  x 3 and has demonstrated capacity to refuse/direct care.  I have advised the patient that they can and should return immediately should they develop any worse/different/additional symptoms, or if they change their mind and want to continue their care. She is compliant with these recommendations.
KEVIN	Amy	Arik	1965	Female	[address]	[facility]	[id number]    Prescription Information      PDI Filter:    PDI	Current Rx	Drug Type	Rx Written	Rx Dispensed	Drug	Quantity	Days Supply	Prescriber Name	Prescriber NAZIA #	Payment Method	Dispenser  A	Y	O	03/10/2025	03/10/2025	oxycodone hcl (ir) 20 mg tab	65	30	Dyllan Mills	[id number]	[facility]  A	Y	O	02/10/2025	02/12/2025	oxycodone hcl (ir) 20 mg tab	65	30	Dyllan Mills	[id number]	[facility]  A	N	O	01/13/2025	01/13/2025	oxycodone hcl (ir) 20 mg tab	65	30	Dyllan Mills	[id number]	[facility]  A	N	O	12/09/2024	12/09/2024	oxycodone hcl (ir) 20 mg tab	65	30	Dyllan Mills	[id number]	[facility]  A	N	O	11/05/2024	11/05/2024	oxycodone hcl (ir) 20 mg tab	65	30	MillsDyllan sauceda	[id number]	[facility]  A	N	O	10/08/2024	10/08/2024	oxycodone hcl (ir) 20 mg tab	65	30	MillsDyllan sauceda	[id number]	[facility]  A	N	O	09/10/2024	09/10/2024	oxycodone hcl (ir) 20 mg tab	65	30	MillsDyllan sauceda	[id number]	[facility]  A	N	O	08/13/2024	08/13/2024	oxycodone hcl (ir) 20 mg tab	65	30	MillsDyllan sauceda	[id number]	[facility]  A	N	O	07/16/2024	07/16/2024	oxycodone hcl (ir) 20 mg tab	65	30	Dyllan Mills	[id number]	[facility]  A	N	O	06/11/2024	06/14/2024	oxycodone hcl (ir) 20 mg tab	65	22	MillsDyllan sauceda	[id number]	[facility]  A	N	O	05/21/2024	05/21/2024	oxycodone hcl (ir) 20 mg tab	65	30	Dyllan Mills	[id number]	Insurance	[facility]  A	N	O	04/16/2024	04/16/2024	oxycodone hcl (ir) 20 mg tab	65	30	Dyllan Mills	[id number]	Insurance	[facility]  A	N	O	03/19/2024	03/19/2024	oxycodone hcl (ir) 20 mg tab	65	30	Dyllan Mills	[id number]	[facility]

## 2025-03-13 NOTE — DISCHARGE NOTE NURSING/CASE MANAGEMENT/SOCIAL WORK - PATIENT PORTAL LINK FT
You can access the FollowMyHealth Patient Portal offered by [facility] by registering at the following website: [email or web address]. By joining [employer]’s FollowMyHealth portal, you will also be able to view your health information using other applications (apps) compatible with our system.

## 2025-03-13 NOTE — PROGRESS NOTE ADULT - ATTENDING COMMENTS
59-year-old female with a PMHx of COPD, CAD and CVA who presented with initially presented on 3/8 with weakness, fever and chills (discharged with PO Abx) who re-presented with E. coli bacteremia 2/2 pyelonephritis.  Continue with CTX based on E. coli sensitivities, surveillance BCx NG x 24 hours. Patient was educated that the medical team prefers to wait until surveillance BCx are negative for 48 hours prior to discharge but patient preferred to leave today ASAP for personal reasons.  Patient demonstrated full understanding and has capacity to make medical decisions. Plan to discharge with Levofloxacin to complete 7-day course.      Rest of plan as per resident note.

## 2025-03-13 NOTE — PROGRESS NOTE ADULT - PROBLEM SELECTOR PLAN 1
Pt presented to ED 3 days prior for diffuse body aches, weakness, diarrhea, fever, and chills was sent home from ED with PO Cefuroxime. Pt was then called back for positive blood cultures x2  growing E. coli.  Patient reports persistent generalized malaise, however the fevers and chills have improved. Pt reports diarrhea x 2 wks which has started to resolve. On exam afebrile, HDS. Bcx growing E-coli sen to CTX, Pt received Zosyn x2, will continue with CTX    - f/u repeat Bcx   - Start CTX 2g IV q24   - Pt presented to ED 3 days prior for diffuse body aches, weakness, diarrhea, fever, and chills was sent home from ED with PO Cefuroxime. Pt was then called back for positive blood cultures x2  growing E. coli.  Patient reports persistent generalized malaise, however the fevers and chills have improved. Pt reports diarrhea x 2 wks which has started to resolve. On exam afebrile, HDS. Bcx growing E-coli sen to CTX, Pt received Zosyn x2, will continue with CTX    - repeat Bcx   - Start CTX 2g IV q24 Pt presented to ED 3 days prior for diffuse body aches, weakness, diarrhea, fever, and chills was sent home from ED with PO Cefuroxime. Pt was then called back for positive blood cultures x2  growing E. coli.  Patient reports persistent generalized malaise, however the fevers and chills have improved. Pt reports diarrhea x 2 wks which has started to resolve. On exam afebrile, HDS. Bcx growing E-coli sen to CTX, Pt received Zosyn x2, will continue with CTX    - repeat Bcx no growth at 24hr  - Start CTX 2g IV q24

## 2025-03-13 NOTE — DISCHARGE NOTE NURSING/CASE MANAGEMENT/SOCIAL WORK - NSDCPEPAMP_GEN_ALL_CORE
Addended by: KEEGAN PALMER on: 3/4/2024 11:05 AM     Modules accepted: Orders     “[facility] for Tobacco Control” pamphlet given

## 2025-03-13 NOTE — DISCHARGE NOTE NURSING/CASE MANAGEMENT/SOCIAL WORK - FINANCIAL ASSISTANCE
[facility] provides services at a reduced cost to those who are determined to be eligible through [facility]’s financial assistance program. Information regarding [facility]’s financial assistance program can be found by going to [email or web address] or by calling [phone].

## 2025-03-13 NOTE — DISCHARGE NOTE NURSING/CASE MANAGEMENT/SOCIAL WORK - NSDCPEWEB_GEN_ALL_CORE
[facility] for Tobacco Control website --- [email or web address] website --- [email or web address]

## 2025-03-13 NOTE — DISCHARGE NOTE NURSING/CASE MANAGEMENT/SOCIAL WORK - NSDCPETBCESMAN_GEN_ALL_CORE
Pt asking when he will be discharged and if he can have food.     If you are a smoker, it is important for your health to stop smoking. Please be aware that second hand smoke is also harmful.

## 2025-03-13 NOTE — PROGRESS NOTE ADULT - PROBLEM SELECTOR PLAN 7
Pt reports chronic MSK pain (back/neck pain/shoulder) 2/2 degenerative joint disease. At baseline pt ambulates with rolling walker/cane, has 6 days of HHA at home. Per pt on home oxycodone 20mg PRN, Seroquel 400mg qhs and Diazepam 0.5mg BID. On exam pt not complaining of any acute pain at this time     - I stop/Med rec   - PT

## 2025-03-13 NOTE — PROGRESS NOTE ADULT - NUTRITIONAL ASSESSMENT
This patient has been assessed with a concern for Malnutrition and has been determined to have a diagnosis/diagnoses of Severe protein-calorie malnutrition.    This patient is being managed with:   Diet Regular-  Entered: Mar 12 2025  1:56AM

## 2025-03-20 DIAGNOSIS — Z53.29 PROCEDURE AND TREATMENT NOT CARRIED OUT BECAUSE OF PATIENT'S DECISION FOR OTHER REASONS: ICD-10-CM

## 2025-03-20 DIAGNOSIS — Z88.1 ALLERGY STATUS TO OTHER ANTIBIOTIC AGENTS: ICD-10-CM

## 2025-03-20 DIAGNOSIS — I25.10 ATHEROSCLEROTIC HEART DISEASE OF NATIVE CORONARY ARTERY WITHOUT ANGINA PECTORIS: ICD-10-CM

## 2025-03-20 DIAGNOSIS — E43 UNSPECIFIED SEVERE PROTEIN-CALORIE MALNUTRITION: ICD-10-CM

## 2025-03-20 DIAGNOSIS — Z88.5 ALLERGY STATUS TO NARCOTIC AGENT: ICD-10-CM

## 2025-03-20 DIAGNOSIS — B96.20 UNSPECIFIED ESCHERICHIA COLI [E. COLI] AS THE CAUSE OF DISEASES CLASSIFIED ELSEWHERE: ICD-10-CM

## 2025-03-20 DIAGNOSIS — Z79.82 LONG TERM (CURRENT) USE OF ASPIRIN: ICD-10-CM

## 2025-03-20 DIAGNOSIS — M47.9 SPONDYLOSIS, UNSPECIFIED: ICD-10-CM

## 2025-03-20 DIAGNOSIS — Z99.89 DEPENDENCE ON OTHER ENABLING MACHINES AND DEVICES: ICD-10-CM

## 2025-03-20 DIAGNOSIS — Z86.73 PERSONAL HISTORY OF TRANSIENT ISCHEMIC ATTACK (TIA), AND CEREBRAL INFARCTION WITHOUT RESIDUAL DEFICITS: ICD-10-CM

## 2025-03-20 DIAGNOSIS — I10 ESSENTIAL (PRIMARY) HYPERTENSION: ICD-10-CM

## 2025-03-20 DIAGNOSIS — F32.A DEPRESSION, UNSPECIFIED: ICD-10-CM

## 2025-03-20 DIAGNOSIS — J44.89 OTHER SPECIFIED CHRONIC OBSTRUCTIVE PULMONARY DISEASE: ICD-10-CM

## 2025-03-20 DIAGNOSIS — Z79.51 LONG TERM (CURRENT) USE OF INHALED STEROIDS: ICD-10-CM

## 2025-03-20 DIAGNOSIS — Z95.5 PRESENCE OF CORONARY ANGIOPLASTY IMPLANT AND GRAFT: ICD-10-CM

## 2025-03-20 DIAGNOSIS — Z91.018 ALLERGY TO OTHER FOODS: ICD-10-CM

## 2025-03-20 DIAGNOSIS — I25.2 OLD MYOCARDIAL INFARCTION: ICD-10-CM

## 2025-03-20 DIAGNOSIS — N12 TUBULO-INTERSTITIAL NEPHRITIS, NOT SPECIFIED AS ACUTE OR CHRONIC: ICD-10-CM

## 2025-03-20 DIAGNOSIS — Z79.52 LONG TERM (CURRENT) USE OF SYSTEMIC STEROIDS: ICD-10-CM

## 2025-03-20 DIAGNOSIS — E78.5 HYPERLIPIDEMIA, UNSPECIFIED: ICD-10-CM

## 2025-03-20 DIAGNOSIS — R78.81 BACTEREMIA: ICD-10-CM

## 2025-03-20 DIAGNOSIS — R19.7 DIARRHEA, UNSPECIFIED: ICD-10-CM

## 2025-03-20 DIAGNOSIS — G47.33 OBSTRUCTIVE SLEEP APNEA (ADULT) (PEDIATRIC): ICD-10-CM

## 2025-03-20 DIAGNOSIS — G89.29 OTHER CHRONIC PAIN: ICD-10-CM

## 2025-03-20 DIAGNOSIS — F17.210 NICOTINE DEPENDENCE, CIGARETTES, UNCOMPLICATED: ICD-10-CM

## 2025-03-20 DIAGNOSIS — M19.019 PRIMARY OSTEOARTHRITIS, UNSPECIFIED SHOULDER: ICD-10-CM

## 2025-03-20 DIAGNOSIS — N17.0 ACUTE KIDNEY FAILURE WITH TUBULAR NECROSIS: ICD-10-CM

## 2025-03-21 NOTE — ED ADULT TRIAGE NOTE - CCCP TRG CHIEF CMPLNT
Patient was recently hospitalized for non CHF diagnosis. CHF reminder made to follow-up.    Closing this encounter.    eye discharge

## 2025-04-22 NOTE — ED ADULT NURSE NOTE - PAIN RATING/NUMBER SCALE (0-10): REST
What Type Of Note Output Would You Prefer (Optional)?: Standard Output How Severe Is Your Rash?: mild Is This A New Presentation, Or A Follow-Up?: Rash 3 Additional History: Pt has a history of basal and squamous cell.

## 2025-07-24 ENCOUNTER — EMERGENCY (EMERGENCY)
Facility: HOSPITAL | Age: 60
LOS: 1 days | End: 2025-07-24
Attending: EMERGENCY MEDICINE | Admitting: EMERGENCY MEDICINE
Payer: MEDICARE

## 2025-07-24 VITALS
DIASTOLIC BLOOD PRESSURE: 94 MMHG | HEART RATE: 103 BPM | OXYGEN SATURATION: 97 % | WEIGHT: 164.91 LBS | RESPIRATION RATE: 18 BRPM | SYSTOLIC BLOOD PRESSURE: 162 MMHG | TEMPERATURE: 98 F

## 2025-07-24 VITALS
TEMPERATURE: 98 F | SYSTOLIC BLOOD PRESSURE: 157 MMHG | DIASTOLIC BLOOD PRESSURE: 96 MMHG | HEART RATE: 86 BPM | RESPIRATION RATE: 18 BRPM | OXYGEN SATURATION: 97 %

## 2025-07-24 DIAGNOSIS — K64.8 OTHER HEMORRHOIDS: Chronic | ICD-10-CM

## 2025-07-24 DIAGNOSIS — Z91.018 ALLERGY TO OTHER FOODS: ICD-10-CM

## 2025-07-24 DIAGNOSIS — Z88.1 ALLERGY STATUS TO OTHER ANTIBIOTIC AGENTS: ICD-10-CM

## 2025-07-24 DIAGNOSIS — Z88.5 ALLERGY STATUS TO NARCOTIC AGENT: ICD-10-CM

## 2025-07-24 DIAGNOSIS — Z87.448 PERSONAL HISTORY OF OTHER DISEASES OF URINARY SYSTEM: ICD-10-CM

## 2025-07-24 LAB
ANION GAP SERPL CALC-SCNC: 13 MMOL/L — SIGNIFICANT CHANGE UP (ref 5–17)
APPEARANCE UR: CLEAR — SIGNIFICANT CHANGE UP
BASOPHILS # BLD AUTO: 0.09 K/UL — SIGNIFICANT CHANGE UP (ref 0–0.2)
BASOPHILS NFR BLD AUTO: 0.8 % — SIGNIFICANT CHANGE UP (ref 0–2)
BILIRUB UR-MCNC: NEGATIVE — SIGNIFICANT CHANGE UP
BUN SERPL-MCNC: 21 MG/DL — SIGNIFICANT CHANGE UP (ref 7–23)
CALCIUM SERPL-MCNC: 9.7 MG/DL — SIGNIFICANT CHANGE UP (ref 8.4–10.5)
CHLORIDE SERPL-SCNC: 106 MMOL/L — SIGNIFICANT CHANGE UP (ref 96–108)
CO2 SERPL-SCNC: 24 MMOL/L — SIGNIFICANT CHANGE UP (ref 22–31)
COLOR SPEC: YELLOW — SIGNIFICANT CHANGE UP
CREAT SERPL-MCNC: 0.85 MG/DL — SIGNIFICANT CHANGE UP (ref 0.5–1.3)
DIFF PNL FLD: NEGATIVE — SIGNIFICANT CHANGE UP
EGFR: 78 ML/MIN/1.73M2 — SIGNIFICANT CHANGE UP
EGFR: 78 ML/MIN/1.73M2 — SIGNIFICANT CHANGE UP
EOSINOPHIL # BLD AUTO: 0.22 K/UL — SIGNIFICANT CHANGE UP (ref 0–0.5)
EOSINOPHIL NFR BLD AUTO: 1.8 % — SIGNIFICANT CHANGE UP (ref 0–6)
GLUCOSE SERPL-MCNC: 108 MG/DL — HIGH (ref 70–99)
GLUCOSE UR QL: NEGATIVE MG/DL — SIGNIFICANT CHANGE UP
HCT VFR BLD CALC: 43.8 % — SIGNIFICANT CHANGE UP (ref 34.5–45)
HGB BLD-MCNC: 14.3 G/DL — SIGNIFICANT CHANGE UP (ref 11.5–15.5)
IMM GRANULOCYTES # BLD AUTO: 0.1 K/UL — HIGH (ref 0–0.07)
IMM GRANULOCYTES NFR BLD AUTO: 0.8 % — SIGNIFICANT CHANGE UP (ref 0–0.9)
KETONES UR QL: ABNORMAL MG/DL
LEUKOCYTE ESTERASE UR-ACNC: NEGATIVE — SIGNIFICANT CHANGE UP
LYMPHOCYTES # BLD AUTO: 3.54 K/UL — HIGH (ref 1–3.3)
LYMPHOCYTES NFR BLD AUTO: 29.7 % — SIGNIFICANT CHANGE UP (ref 13–44)
MCHC RBC-ENTMCNC: 28.8 PG — SIGNIFICANT CHANGE UP (ref 27–34)
MCHC RBC-ENTMCNC: 32.6 G/DL — SIGNIFICANT CHANGE UP (ref 32–36)
MCV RBC AUTO: 88.3 FL — SIGNIFICANT CHANGE UP (ref 80–100)
MONOCYTES # BLD AUTO: 0.62 K/UL — SIGNIFICANT CHANGE UP (ref 0–0.9)
MONOCYTES NFR BLD AUTO: 5.2 % — SIGNIFICANT CHANGE UP (ref 2–14)
NEUTROPHILS # BLD AUTO: 7.33 K/UL — SIGNIFICANT CHANGE UP (ref 1.8–7.4)
NEUTROPHILS NFR BLD AUTO: 61.7 % — SIGNIFICANT CHANGE UP (ref 43–77)
NITRITE UR-MCNC: NEGATIVE — SIGNIFICANT CHANGE UP
NRBC # BLD AUTO: 0 K/UL — SIGNIFICANT CHANGE UP (ref 0–0)
NRBC # FLD: 0 K/UL — SIGNIFICANT CHANGE UP (ref 0–0)
NRBC BLD AUTO-RTO: 0 /100 WBCS — SIGNIFICANT CHANGE UP (ref 0–0)
PH UR: 7 — SIGNIFICANT CHANGE UP (ref 5–8)
PLATELET # BLD AUTO: 225 K/UL — SIGNIFICANT CHANGE UP (ref 150–400)
PMV BLD: 13.1 FL — HIGH (ref 7–13)
POTASSIUM SERPL-MCNC: 3.9 MMOL/L — SIGNIFICANT CHANGE UP (ref 3.5–5.3)
POTASSIUM SERPL-SCNC: 3.9 MMOL/L — SIGNIFICANT CHANGE UP (ref 3.5–5.3)
PROT UR-MCNC: SIGNIFICANT CHANGE UP MG/DL
RBC # BLD: 4.96 M/UL — SIGNIFICANT CHANGE UP (ref 3.8–5.2)
RBC # FLD: 13.9 % — SIGNIFICANT CHANGE UP (ref 10.3–14.5)
SODIUM SERPL-SCNC: 143 MMOL/L — SIGNIFICANT CHANGE UP (ref 135–145)
SP GR SPEC: 1.02 — SIGNIFICANT CHANGE UP (ref 1–1.03)
UROBILINOGEN FLD QL: 1 MG/DL — SIGNIFICANT CHANGE UP (ref 0.2–1)
WBC # BLD: 11.9 K/UL — HIGH (ref 3.8–10.5)
WBC # FLD AUTO: 11.9 K/UL — HIGH (ref 3.8–10.5)

## 2025-07-24 PROCEDURE — 36415 COLL VENOUS BLD VENIPUNCTURE: CPT

## 2025-07-24 PROCEDURE — 99284 EMERGENCY DEPT VISIT MOD MDM: CPT | Mod: 25

## 2025-07-24 PROCEDURE — 99284 EMERGENCY DEPT VISIT MOD MDM: CPT

## 2025-07-24 PROCEDURE — 74176 CT ABD & PELVIS W/O CONTRAST: CPT

## 2025-07-24 PROCEDURE — 81003 URINALYSIS AUTO W/O SCOPE: CPT

## 2025-07-24 PROCEDURE — 96375 TX/PRO/DX INJ NEW DRUG ADDON: CPT

## 2025-07-24 PROCEDURE — 74176 CT ABD & PELVIS W/O CONTRAST: CPT | Mod: 26

## 2025-07-24 PROCEDURE — 80048 BASIC METABOLIC PNL TOTAL CA: CPT

## 2025-07-24 PROCEDURE — 85025 COMPLETE CBC W/AUTO DIFF WBC: CPT

## 2025-07-24 PROCEDURE — 96374 THER/PROPH/DIAG INJ IV PUSH: CPT

## 2025-07-24 RX ORDER — KETOROLAC TROMETHAMINE 30 MG/ML
1 INJECTION, SOLUTION INTRAMUSCULAR; INTRAVENOUS
Qty: 15 | Refills: 0
Start: 2025-07-24 | End: 2025-07-28

## 2025-07-24 RX ORDER — ONDANSETRON HCL/PF 4 MG/2 ML
8 VIAL (ML) INJECTION ONCE
Refills: 0 | Status: COMPLETED | OUTPATIENT
Start: 2025-07-24 | End: 2025-07-24

## 2025-07-24 RX ORDER — LISINOPRIL 5 MG/1
10 TABLET ORAL ONCE
Refills: 0 | Status: DISCONTINUED | OUTPATIENT
Start: 2025-07-24 | End: 2025-07-24

## 2025-07-24 RX ORDER — LISINOPRIL 5 MG/1
40 TABLET ORAL DAILY
Refills: 0 | Status: DISCONTINUED | OUTPATIENT
Start: 2025-07-24 | End: 2025-07-28

## 2025-07-24 RX ORDER — KETOROLAC TROMETHAMINE 30 MG/ML
30 INJECTION, SOLUTION INTRAMUSCULAR; INTRAVENOUS ONCE
Refills: 0 | Status: DISCONTINUED | OUTPATIENT
Start: 2025-07-24 | End: 2025-07-24

## 2025-07-24 RX ADMIN — Medication 1000 MILLILITER(S): at 17:48

## 2025-07-24 RX ADMIN — Medication 8 MILLIGRAM(S): at 17:49

## 2025-07-24 RX ADMIN — KETOROLAC TROMETHAMINE 30 MILLIGRAM(S): 30 INJECTION, SOLUTION INTRAMUSCULAR; INTRAVENOUS at 17:49

## 2025-07-24 RX ADMIN — LISINOPRIL 40 MILLIGRAM(S): 5 TABLET ORAL at 19:08

## 2025-07-24 NOTE — ED PROVIDER NOTE - OBJECTIVE STATEMENT
- Summary : 65-year-old patient presenting with severe back pain, nausea, and vomiting. History of recent kidney infection in March. Reports being in constant pain and unable to enjoy a recent cruise due to symptoms.  - Chief Complaint (CC) : Back pain  - History of Present Illness (HPI) : Patient reports severe pain in the back, specifically on the left side. The pain has been persistent and debilitating, causing the patient to spend four days in bed during a recent cruise. Associated symptoms include nausea and vomiting. Patient describes the pain as 'horrible' and mentions difficulty keeping food down. The onset is not clearly stated, but symptoms have been present for at least a week. Patient took opiate type RX last week for pain management, with limited relief.

## 2025-07-24 NOTE — ED ADULT TRIAGE NOTE - CHIEF COMPLAINT QUOTE
pt.. hx of kidney infection / UTI 3 months ago, p/w returning of s/s: for 2 weeks, she has been experiencing pelvic pain, back pain, body aches, fevers (highest 102F at home yesterday), cp, sob, dizziness. ekg done.

## 2025-07-24 NOTE — ED PROVIDER NOTE - CLINICAL SUMMARY MEDICAL DECISION MAKING FREE TEXT BOX
left lower back and CVA Tenderness on exam.  Patient concerned for kidney infection.  Urine analysis does not support urinary tract infection infection.  CT conducted shows left sided ureter punctate kidney stones, no evidence of pyelonephritis no hydronephrosis.  Patient's pain improved with IV Toradol nausea improved with Zofran IV fluids given.  Patient to be discharged home with Toradol patient has opiate pain medications at home is under the care of her pain management specialist Dr. Mills.  Blood pressure was managed with lisinopril and HCTZ.

## 2025-07-24 NOTE — ED ADULT NURSE NOTE - NSFALLRISKINTERV_ED_ALL_ED

## 2025-07-24 NOTE — ED ADULT NURSE NOTE - NSSUHOSCREENINGYN_ED_ALL_ED
Subjective   Hanna Roque is a 39 y.o. female.     HPI Comments: Patient developed intermittent left ear pain and diminished hearing 6 days ago. She returned home yesterday, via airplane, from a trip to [place], and that is when the pain became constant. She had her ears irrigated about 2 years ago for cerumen accumulation. She applied debrox drops to her left ear this morning without any symptom improvement.    Earache    There is pain in the left ear. This is a new problem. The current episode started yesterday. The problem occurs constantly. The problem has been waxing and waning. There has been no fever. Associated symptoms include hearing loss (intermittently in left ear). Pertinent negatives include no abdominal pain, coughing, diarrhea, ear discharge, headaches, neck pain, rash, rhinorrhea, sore throat or vomiting. She has tried ear drops (debrox today) for the symptoms. The treatment provided no relief. There is no history of a chronic ear infection, hearing loss or a tympanostomy tube.       The following portions of the patient's history were reviewed and updated as appropriate: allergies, current medications, past family history, past medical history, past social history, past surgical history and problem list.    Review of Systems   Constitutional: Negative for appetite change, chills, diaphoresis, fatigue and fever.   HENT: Positive for ear pain and hearing loss (intermittently in left ear). Negative for congestion, dental problem, ear discharge, facial swelling, mouth sores, nosebleeds, postnasal drip, rhinorrhea, sinus pressure, sneezing, sore throat, tinnitus, trouble swallowing and voice change.    Eyes: Negative for pain, discharge, redness and itching.   Respiratory: Negative for cough, chest tightness, shortness of breath, wheezing and stridor.    Cardiovascular: Negative for chest pain and palpitations.   Gastrointestinal: Negative for abdominal pain, constipation, diarrhea, nausea and  vomiting.   Genitourinary: Negative for decreased urine volume.   Musculoskeletal: Negative for myalgias, neck pain and neck stiffness.   Skin: Negative for rash.   Allergic/Immunologic: Positive for environmental allergies.   Neurological: Negative for dizziness, syncope, weakness and headaches.       Objective   Physical Exam   Constitutional: She is oriented to person, place, and time. She appears well-developed and well-nourished. She is cooperative.  Non-toxic appearance. She does not appear ill. No distress.   HENT:   Right Ear: Hearing and external ear normal. Tympanic membrane is erythematous. Tympanic membrane is not scarred, not perforated, not retracted and not bulging.   Left Ear: External ear normal. There is tenderness. Decreased hearing is noted.   Nose: Nose normal. Right sinus exhibits no maxillary sinus tenderness and no frontal sinus tenderness. Left sinus exhibits no maxillary sinus tenderness and no frontal sinus tenderness.   Mouth/Throat: Uvula is midline, oropharynx is clear and moist and mucous membranes are normal. Tonsils are 2+ on the right. Tonsils are 2+ on the left. No tonsillar exudate.   Bilateral canals completely occluded with black cerumen prior to irrigation.   Post irrigation, only right TM could be visualized.    Eyes: Conjunctivae and lids are normal.   Neurological: She is alert and oriented to person, place, and time.   Vitals reviewed.      Assessment/Plan   Hanna was seen today for earache.    Diagnoses and all orders for this visit:    Right non-suppurative otitis media  -     amoxicillin (AMOXIL) 875 MG tablet; Take 1 tablet by mouth 2 (Two) Times a Day for 10 days.    Impacted cerumen of left ear  -     carbamide peroxide (DEBROX) 6.5 % otic solution; Administer 5 drops into the left ear 2 (Two) Times a Day for 7 days.  -     Ear Cerumen Removal Instrumentation         Follow up with PCP for persistent symptoms  Return to clinic in 1 week for ear irrigation   Follow up  with urgent treatment if symptoms worsen        Yes - the patient is able to be screened

## 2025-07-24 NOTE — ED ADULT NURSE NOTE - OBJECTIVE STATEMENT
59 yo F PMHx HTN, asthma presents to the ED co b/l flank pain, body aches, chills. Pt awake and alert, AOx4. Pt states, "I have had a kidney infection in the past and this is what it feels like." Pt reports vomiting in the last 2 days. No diarrhea. Pt 59 yo F PMHx HTN, asthma presents to the ED co b/l flank pain, body aches, chills. Pt awake and alert, AOx4. Pt states, "I have had a kidney infection in the past and this is what it feels like." Pt reports vomiting in the last 2 days. No diarrhea. Pt denies burning with urination. Pt reports when she urinates it hurts in her flanks. Pt denies dizziness, numbness, tingling, vision changes.

## 2025-07-24 NOTE — ED PROVIDER NOTE - PATIENT PORTAL LINK FT
You can access the FollowMyHealth Patient Portal offered by [facility] by registering at the following website: [email or web address]. By joining [employer]’s FollowMyHealth portal, you will also be able to view your health information using other applications (apps) compatible with our system.

## 2025-09-16 ENCOUNTER — EMERGENCY (EMERGENCY)
Facility: HOSPITAL | Age: 60
LOS: 1 days | End: 2025-09-16
Attending: EMERGENCY MEDICINE | Admitting: EMERGENCY MEDICINE
Payer: MEDICARE

## 2025-09-16 VITALS
HEART RATE: 96 BPM | WEIGHT: 179.9 LBS | SYSTOLIC BLOOD PRESSURE: 169 MMHG | OXYGEN SATURATION: 95 % | DIASTOLIC BLOOD PRESSURE: 118 MMHG | RESPIRATION RATE: 15 BRPM | TEMPERATURE: 99 F

## 2025-09-16 DIAGNOSIS — I10 ESSENTIAL (PRIMARY) HYPERTENSION: ICD-10-CM

## 2025-09-16 DIAGNOSIS — Z91.018 ALLERGY TO OTHER FOODS: ICD-10-CM

## 2025-09-16 DIAGNOSIS — S06.9X9A UNSPECIFIED INTRACRANIAL INJURY WITH LOSS OF CONSCIOUSNESS OF UNSPECIFIED DURATION, INITIAL ENCOUNTER: ICD-10-CM

## 2025-09-16 DIAGNOSIS — Z88.5 ALLERGY STATUS TO NARCOTIC AGENT: ICD-10-CM

## 2025-09-16 DIAGNOSIS — K64.8 OTHER HEMORRHOIDS: Chronic | ICD-10-CM
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
## 2025-09-16 DIAGNOSIS — M54.2 CERVICALGIA: ICD-10-CM
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
## 2025-09-16 DIAGNOSIS — Z88.1 ALLERGY STATUS TO OTHER ANTIBIOTIC AGENTS: ICD-10-CM

## 2025-09-16 PROCEDURE — 72125 CT NECK SPINE W/O DYE: CPT | Mod: 26

## 2025-09-16 PROCEDURE — 70450 CT HEAD/BRAIN W/O DYE: CPT | Mod: 26

## 2025-09-16 RX ORDER — ACETAMINOPHEN 500 MG/5ML
975 LIQUID (ML) ORAL ONCE
Refills: 0 | Status: COMPLETED | OUTPATIENT
Start: 2025-09-16 | End: 2025-09-16

## 2025-09-16 RX ORDER — LISINOPRIL 5 MG/1
40 TABLET ORAL ONCE
Refills: 0 | Status: COMPLETED | OUTPATIENT
Start: 2025-09-16 | End: 2025-09-16

## 2025-09-16 RX ADMIN — Medication 975 MILLIGRAM(S): at 12:41

## 2025-09-16 RX ADMIN — LISINOPRIL 40 MILLIGRAM(S): 5 TABLET ORAL at 12:41
